# Patient Record
Sex: MALE | Employment: OTHER | URBAN - METROPOLITAN AREA
[De-identification: names, ages, dates, MRNs, and addresses within clinical notes are randomized per-mention and may not be internally consistent; named-entity substitution may affect disease eponyms.]

---

## 2017-04-10 ENCOUNTER — TRANSFERRED RECORDS (OUTPATIENT)
Dept: HEALTH INFORMATION MANAGEMENT | Facility: CLINIC | Age: 72
End: 2017-04-10

## 2017-04-24 ENCOUNTER — TRANSFERRED RECORDS (OUTPATIENT)
Dept: HEALTH INFORMATION MANAGEMENT | Facility: CLINIC | Age: 72
End: 2017-04-24

## 2017-04-28 ENCOUNTER — TRANSFERRED RECORDS (OUTPATIENT)
Dept: HEALTH INFORMATION MANAGEMENT | Facility: CLINIC | Age: 72
End: 2017-04-28

## 2021-06-17 ENCOUNTER — TRANSFERRED RECORDS (OUTPATIENT)
Dept: HEALTH INFORMATION MANAGEMENT | Facility: CLINIC | Age: 76
End: 2021-06-17

## 2021-11-11 ENCOUNTER — TRANSFERRED RECORDS (OUTPATIENT)
Dept: HEALTH INFORMATION MANAGEMENT | Facility: CLINIC | Age: 76
End: 2021-11-11

## 2022-08-08 ENCOUNTER — TRANSFERRED RECORDS (OUTPATIENT)
Dept: HEALTH INFORMATION MANAGEMENT | Facility: CLINIC | Age: 77
End: 2022-08-08

## 2023-04-24 PROCEDURE — 88305 TISSUE EXAM BY PATHOLOGIST: CPT | Mod: 26 | Performed by: PATHOLOGY

## 2023-04-24 PROCEDURE — 88305 TISSUE EXAM BY PATHOLOGIST: CPT | Mod: TC,ORL | Performed by: INTERNAL MEDICINE

## 2023-04-25 ENCOUNTER — LAB REQUISITION (OUTPATIENT)
Dept: LAB | Facility: CLINIC | Age: 78
End: 2023-04-25
Payer: MEDICARE

## 2023-04-25 DIAGNOSIS — R10.84 GENERALIZED ABDOMINAL PAIN: ICD-10-CM

## 2023-04-25 DIAGNOSIS — D12.5 BENIGN NEOPLASM OF SIGMOID COLON: ICD-10-CM

## 2023-04-25 DIAGNOSIS — K59.00 CONSTIPATION, UNSPECIFIED: ICD-10-CM

## 2023-04-25 DIAGNOSIS — K57.30 DIVERTICULOSIS OF LARGE INTESTINE WITHOUT PERFORATION OR ABSCESS WITHOUT BLEEDING: ICD-10-CM

## 2023-04-25 DIAGNOSIS — R19.4 CHANGE IN BOWEL HABIT: ICD-10-CM

## 2023-04-27 LAB
PATH REPORT.COMMENTS IMP SPEC: NORMAL
PATH REPORT.COMMENTS IMP SPEC: NORMAL
PATH REPORT.FINAL DX SPEC: NORMAL
PATH REPORT.GROSS SPEC: NORMAL
PATH REPORT.MICROSCOPIC SPEC OTHER STN: NORMAL
PATH REPORT.RELEVANT HX SPEC: NORMAL
PHOTO IMAGE: NORMAL

## 2023-05-25 ENCOUNTER — TRANSFERRED RECORDS (OUTPATIENT)
Dept: HEALTH INFORMATION MANAGEMENT | Facility: CLINIC | Age: 78
End: 2023-05-25

## 2023-06-01 ENCOUNTER — TRANSFERRED RECORDS (OUTPATIENT)
Dept: HEALTH INFORMATION MANAGEMENT | Facility: CLINIC | Age: 78
End: 2023-06-01

## 2023-07-07 ENCOUNTER — TRANSCRIBE ORDERS (OUTPATIENT)
Dept: OTHER | Age: 78
End: 2023-07-07

## 2023-07-07 DIAGNOSIS — G62.9 POLYNEUROPATHY: Primary | ICD-10-CM

## 2023-07-28 NOTE — TELEPHONE ENCOUNTER
Action 7/28/23 MV 11.15am   Action Taken 1) imaging request faxed to [facility]  2) records request faxed to [facility]      Action 8/22/23 MV 7.05am   Action Taken 1) 2nd request faxed to [facility]   2) [facility] images resolved in PACS     Action 9/13/23 MV 12.13pm   Action Taken [facility] records received and sent to scanning       RECORDS RECEIVED FROM: external   REASON FOR VISIT: Polyneuropathy    Date of Appt: 10/23/23   NOTES (FOR ALL VISITS) STATUS DETAILS   OFFICE NOTE from referring provider Care Everywhere Dr Vaughn Murphy @ [facility]:  7/6/23 4/18/23   OFFICE NOTE from other specialist Received Dr García Benito @ [facility]:  6/1/23 9/8/21   EMG Care Everywhere [facility]:  11/11/21    [facility]:  11/1/17    [facility]:  4/24/17    **EMG REPORTS ARE IN MEDIA TAB**   MEDICATION LIST Care Everywhere    IMAGING  (FOR ALL VISITS)     MRI (HEAD, NECK, SPINE) PACS [facility]:  MRI Head 9/16/21  MRI Brain 1/6/21  MRI Lumbar Spine 8/16/19  MRI Cervical Spine 6/17/19   CT (HEAD, NECK, SPINE) PACS [facility]:  CT Head 8/8/19  CT Head 6/18/19  CT Head 6/16/19

## 2023-10-23 ENCOUNTER — PRE VISIT (OUTPATIENT)
Dept: NEUROLOGY | Facility: CLINIC | Age: 78
End: 2023-10-23

## 2023-10-23 ENCOUNTER — OFFICE VISIT (OUTPATIENT)
Dept: NEUROLOGY | Facility: CLINIC | Age: 78
End: 2023-10-23
Attending: INTERNAL MEDICINE
Payer: MEDICARE

## 2023-10-23 VITALS
WEIGHT: 180 LBS | DIASTOLIC BLOOD PRESSURE: 76 MMHG | HEART RATE: 72 BPM | BODY MASS INDEX: 29.99 KG/M2 | HEIGHT: 65 IN | SYSTOLIC BLOOD PRESSURE: 117 MMHG | OXYGEN SATURATION: 92 %

## 2023-10-23 DIAGNOSIS — E85.81 LIGHT CHAIN (AL) AMYLOIDOSIS (H): Primary | ICD-10-CM

## 2023-10-23 DIAGNOSIS — M48.062 SPINAL STENOSIS OF LUMBAR REGION WITH NEUROGENIC CLAUDICATION: ICD-10-CM

## 2023-10-23 DIAGNOSIS — G62.9 POLYNEUROPATHY: ICD-10-CM

## 2023-10-23 PROCEDURE — 99205 OFFICE O/P NEW HI 60 MIN: CPT | Performed by: PSYCHIATRY & NEUROLOGY

## 2023-10-23 RX ORDER — LIDOCAINE 50 MG/G
OINTMENT TOPICAL
COMMUNITY
Start: 2023-03-24 | End: 2024-03-21

## 2023-10-23 RX ORDER — DULOXETIN HYDROCHLORIDE 30 MG/1
60 CAPSULE, DELAYED RELEASE ORAL EVERY MORNING
COMMUNITY
End: 2024-03-21

## 2023-10-23 RX ORDER — POTASSIUM CHLORIDE
20 CRYSTALS MISCELLANEOUS 2 TIMES DAILY
Status: ON HOLD | COMMUNITY
End: 2024-03-03

## 2023-10-23 RX ORDER — ISOSORBIDE MONONITRATE 60 MG/1
60 TABLET, EXTENDED RELEASE ORAL DAILY
COMMUNITY
Start: 2023-09-03 | End: 2023-12-04

## 2023-10-23 RX ORDER — ONDANSETRON 4 MG/1
TABLET, ORALLY DISINTEGRATING ORAL
COMMUNITY
Start: 2023-04-24 | End: 2024-03-21

## 2023-10-23 RX ORDER — HYDROCODONE BITARTRATE AND ACETAMINOPHEN 5; 325 MG/1; MG/1
1 TABLET ORAL 3 TIMES DAILY PRN
COMMUNITY
Start: 2023-10-19 | End: 2023-12-04

## 2023-10-23 RX ORDER — TRAZODONE HYDROCHLORIDE 50 MG/1
75 TABLET, FILM COATED ORAL AT BEDTIME
COMMUNITY
Start: 2023-05-09 | End: 2024-03-21

## 2023-10-23 RX ORDER — CHLORHEXIDINE GLUCONATE ORAL RINSE 1.2 MG/ML
15 SOLUTION DENTAL 2 TIMES DAILY PRN
COMMUNITY
Start: 2023-09-29

## 2023-10-23 RX ORDER — NALOXONE HYDROCHLORIDE 4 MG/.1ML
SPRAY NASAL
COMMUNITY
Start: 2023-07-24 | End: 2024-03-21

## 2023-10-23 RX ORDER — TORSEMIDE 10 MG/1
20 TABLET ORAL DAILY
Status: ON HOLD | COMMUNITY
Start: 2023-06-06 | End: 2024-03-03

## 2023-10-23 RX ORDER — METOPROLOL SUCCINATE 25 MG/1
TABLET, EXTENDED RELEASE ORAL
COMMUNITY
Start: 2023-08-07 | End: 2023-12-04

## 2023-10-23 RX ORDER — LUBIPROSTONE 8 UG/1
24 CAPSULE ORAL 2 TIMES DAILY
COMMUNITY
Start: 2023-10-07 | End: 2023-12-04

## 2023-10-23 RX ORDER — ALLOPURINOL 300 MG/1
TABLET ORAL
COMMUNITY
Start: 2023-02-19 | End: 2024-03-21

## 2023-10-23 RX ORDER — METOLAZONE 5 MG/1
TABLET ORAL
Status: ON HOLD | COMMUNITY
End: 2023-12-07

## 2023-10-23 RX ORDER — VITAMIN B COMPLEX
1 CAPSULE ORAL DAILY
COMMUNITY

## 2023-10-23 RX ORDER — BUPROPION HYDROCHLORIDE 150 MG/1
300 TABLET ORAL EVERY MORNING
COMMUNITY
End: 2024-03-21

## 2023-10-23 RX ORDER — FLUTICASONE PROPIONATE 50 MCG
SPRAY, SUSPENSION (ML) NASAL
Status: ON HOLD | COMMUNITY
Start: 2022-04-06 | End: 2024-02-28

## 2023-10-23 RX ORDER — ATORVASTATIN CALCIUM 40 MG/1
40 TABLET, FILM COATED ORAL DAILY
COMMUNITY
Start: 2023-06-13 | End: 2024-03-21

## 2023-10-23 RX ORDER — ALBUTEROL SULFATE 90 UG/1
2 AEROSOL, METERED RESPIRATORY (INHALATION) EVERY 6 HOURS PRN
COMMUNITY
Start: 2021-12-25 | End: 2023-12-04

## 2023-10-23 RX ORDER — CALCIUM CARB/VITAMIN D3/VIT K1 500-500-40
200-400 TABLET,CHEWABLE ORAL
COMMUNITY
Start: 2021-12-22 | End: 2023-12-04

## 2023-10-23 RX ORDER — PANTOPRAZOLE SODIUM 40 MG/1
40 TABLET, DELAYED RELEASE ORAL DAILY
Status: ON HOLD | COMMUNITY
End: 2024-02-28

## 2023-10-23 RX ORDER — FLUOROMETHOLONE 0.1 %
1 SUSPENSION, DROPS(FINAL DOSAGE FORM)(ML) OPHTHALMIC (EYE) PRN
Status: ON HOLD | COMMUNITY
Start: 2022-04-03 | End: 2024-02-28

## 2023-10-23 RX ORDER — ACETAMINOPHEN 500 MG
1000 TABLET ORAL EVERY 6 HOURS PRN
COMMUNITY
Start: 2022-09-27 | End: 2023-12-22

## 2023-10-23 RX ORDER — FINASTERIDE 5 MG/1
1 TABLET, FILM COATED ORAL DAILY
COMMUNITY
Start: 2023-10-06 | End: 2024-03-21

## 2023-10-23 RX ORDER — ACYCLOVIR 200 MG/1
CAPSULE ORAL
COMMUNITY
Start: 2022-11-15 | End: 2023-12-22

## 2023-10-23 RX ORDER — DOCUSATE SODIUM 100 MG/1
CAPSULE, LIQUID FILLED ORAL
COMMUNITY
End: 2023-12-04

## 2023-10-23 RX ORDER — HYDROMORPHONE HYDROCHLORIDE 4 MG/1
TABLET ORAL
COMMUNITY
Start: 2023-03-08 | End: 2023-12-04

## 2023-10-23 RX ORDER — NITROGLYCERIN 0.4 MG/1
TABLET SUBLINGUAL
COMMUNITY
End: 2024-03-21

## 2023-10-23 RX ORDER — GABAPENTIN 300 MG/1
CAPSULE ORAL
COMMUNITY
Start: 2023-07-06 | End: 2023-12-04

## 2023-10-23 RX ORDER — COVID-19 MOLECULAR TEST ASSAY
KIT MISCELLANEOUS
COMMUNITY
Start: 2023-03-18 | End: 2023-12-04

## 2023-10-23 RX ORDER — COLCHICINE 0.6 MG/1
0.6 TABLET ORAL
COMMUNITY
Start: 2021-09-28 | End: 2023-12-04

## 2023-10-23 ASSESSMENT — PAIN SCALES - GENERAL: PAINLEVEL: MODERATE PAIN (5)

## 2023-10-23 NOTE — PATIENT INSTRUCTIONS
I would recommend seeing a general surgeon for biopsy of a piece of your abdominal fat  If this test is negative we should consider a nerve biopsy at your ankle    I am looking for the possibility of amyloidosis.  This is a condition related to abnormal proteins in the blood, that causes deposition of a substance called amyloid in the nerves, and gradually destroys them.  It can cause a very painful neuropathy, but also heart disease, and if untreated, it could even become fatal.  If the biopsy tests are positive, then we should notify your blood doctor immediately, and he should proceed to bone marrow biopsy and additional test, and recommended treatment.    You may have mild diabetes, but I do not think this is enough to explain your neuropathy.    I will contact you with the test results.

## 2023-10-23 NOTE — PROGRESS NOTES
"Vaughn Murphy DO (referring provider)  Jakob Conner MD  (PCP)    [place], October 23, 2023    Dear Doctors,    I had the pleasure to see Eren James at the [facility] neuromuscular clinic today for another opinion on his neuropathy diagnosis.  Thank you for this referral.  As you know he is a very pleasant 77-year-old man with a history of chronic pain, on opioid pump, following with a local pain clinic.  He also takes Dilaudid and in the past he was on opioid doses as high as 180 morphine equivalents per day.  He is also taking duloxetine.  He has had a neuropathy diagnosis for about 6 years, and previously saw my colleagues at [facility], [facility] and [facility] neurological [facility].  He began experiencing a feeling as if \" water was running down his legs\" from the knees to the feet, about 6 years ago.  There was also tingling and numbness and sharp pain there.  He had an EMG study first in 2017, that showed a length-dependent axonal sensorimotor process, with no motor or sensory responses of the feet, and normal sensory nerve amplitudes of the upper extremities.  Needle EMG examination was unremarkable.  [facility] repeated the EMG 2 to 3 years later, and found identical findings, although they did not study the upper extremities. He continues to experience pain in his feet when standing or walking, but not so much at night anymore.  On top of this, he has chronic pain in his back and upper thighs, exacerbated by standing or walking, and forcing him to stop.  This pain improves when he is using the grocery cart or leaning forward, which has made  Suspect lumbar spinal stenosis.  In fact this diagnosis was pretty clear and an MRI of the lumbar spine he had about 4 years ago.  He was diagnosed with diabetes around the same time of the neuropathy diagnosis was made.  His hemoglobin A1c at that time was 6.7%.  I reviewed all the results in the chart, and no hemoglobin A1c was " over 7% in the last 5 years, and he is not receiving any medication treatment for diabetes, nor does he have any endorgan damage.  He used to work in [place] for a decade in the past and was drinking a glass of wine per day, but since he returned to the [place] about a decade ago, he does not drink daily.  There is no family history of neuropathy.  She is not on any vegetarian, vegan, or other restrictive diet.  He has never had bariatric surgery.  In terms of review of systems, he reports prominent dry eyes and dry mouth, occasional dysphagia, and swollen knuckle joints.  She has no rashes, Raynaud's syndrome, anorexia, or weight loss.  He denies blood in his urine or stool.  He has a generalized feeling of fatigue, including difficulty getting up from a chair, ascending stairs, and a feeling of muscle weakness in the last years.  In the past he was on testosterone replacement, injectable, and then gels or patches, but this was discontinued at the recommendation of another physician (there are reasons are somewhat unclear to me).    He reports occasional orthostatic intolerance although no kaz syncope.  He has no signs of gastroparesis, or incontinence of bowel or bladder.  He does report chronic erectile dysfunction as well.    Previously, he had normal B12, TSH, CRP levels, negative JENY antibodies, negative hepatitis C serologies.  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  He has a very abnormal light chain ratio kappa to lambda of less than 0.01.  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  They did not proceed with bone marrow biopsy.  CBC showed no anemia.    Past Medical History:   Diagnosis Date    ASCVD     Previous coronary angioplasties    BACKACHE NOS     Chronic back pain; takes MS Contin    ESOPHAGEAL REFLUX     HYPERLIPIDEMIA NEC/NOS     HYPERTENSION  "NOS     HYPERTROPHY PROSTATE WITH OBST     TESTICULAR HYPOFUNC NEC     URINARY CALCULUS NOS     VISCERAL ANEURYSM NEC     See PS       /76 (BP Location: Right arm, Patient Position: Right side, Cuff Size: Adult Regular)   Pulse 72   Ht 1.651 m (5' 5\")   Wt 81.6 kg (180 lb)   SpO2 92%   BMI 29.95 kg/m      EXAM: His strength is 5/5 for bilateral shoulder abduction, elbow flexion, elbow extension, wrist extension, hand .  FDI and APB are bilaterally 4/5 with mild intrinsic muscle atrophy.  He is hip flexion is 4/5 bilaterally, whereas knee extension and knee flexion, as well as foot dorsiflexion are full.  Great toe extension is about a 3 on the right and 4 - on the left.  Tone is normal.  Ankle reflex were absent.  Knee reflex were 2+ on the right, 1+ on the left.  Upper extremity reflexes were 2+ and symmetric.  Vibration was detected for only 1 to 2 seconds at the right great toe, 0 at the left.  It was also 0 at the medial malleoli.  Joint position sensation was not absent, but it was moderately impaired at both great toes.  Pinprick was absent to the level of the upper third of the calfs bilaterally.  Pinprick of the upper extremities was reduced at the tips of the fingers and normal at the wrist.  He had a hard time getting up from a chair with arms crossed on the chest.  She generally needed to use his arms.  His posture was a little camptocormia and he had a very wide base and a positive Romberg sign.    In summary, Mr. James clinically has a quite severe length-dependent sensorimotor polyneuropathy.  I think the neuropathy both clinically and by EMG is more severe than one would expect by mild diabetes with hemoglobin A1c consistently below 7% without treatment.  Therefore alternative causes should be sought.  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  While most of the times this is coincidental with the neuropathy without causative " relationship, I have some concerns about amyloidosis here, given the very painful and progressive nature of the sensory neuropathy, and some autonomic symptoms (orthostatic intolerance, ED, etc).  I would recommend a general surgery consultation for abdominal fat pad biopsy, followed by a sural nerve biopsy if the former is negative.  I explained to the patient what amyloidosis is, and the fact that if the biops(ies) are positive, he needs to go back to his hematologist, get a bone marrow examination, and a treatment plan.    I am afraid I do not have much to offer regarding his pain management.  It is already very complicated, to the level of opioid pump, and I would defer to his pain clinic.    He also has lumbar spine stenosis with symptoms suggestive of neurogenic claudication.  I would recommend a spine surgery referral, and continued pain management.    I think his proximal thigh weakness is largely due to deconditioning +/- lumbar radiculopathies, although some opioid related hypogonadism could play a role.    I will see the patient follow-up as necessary, but if the biopsies are abnormal, we will make sure that his hematologist is aware.    Sincerely,    Elías Juan MD, FAAN    Total time spent on this encounter today 70 minutes of which 45 face-to-face, 15 in postvisit note dictation, editing, and orders, and 10 in previsit chart review.

## 2023-10-23 NOTE — NURSING NOTE
Chief Complaint   Patient presents with    New Patient    NEUROPATHY       Vitals were taken and medications were reconciled.      Miquel Zamora, Technician  10:52 AM  October 23, 2023

## 2023-10-23 NOTE — LETTER
"10/23/2023       RE: Erne James  [address] [place] [address]     Dear Colleague,    Thank you for referring your patient, Eren James, to the [facility] [place] [facility]. Please see a copy of my visit note below.    Vaughn Murphy DO (referring provider)  Jakob Conner MD  (PCP)    [place], October 23, 2023    Dear Doctors,    I had the pleasure to see Eren James at the [facility] neuromuscular clinic today for another opinion on his neuropathy diagnosis.  Thank you for this referral.  As you know he is a very pleasant 77-year-old man with a history of chronic pain, on opioid pump, following with a local pain clinic.  He also takes Dilaudid and in the past he was on opioid doses as high as 180 morphine equivalents per day.  He is also taking duloxetine.  He has had a neuropathy diagnosis for about 6 years, and previously saw my colleagues at [facility], [facility] and [facility] neurological clinic.  He began experiencing a feeling as if \" water was running down his legs\" from the knees to the feet, about 6 years ago.  There was also tingling and numbness and sharp pain there.  He had an EMG study first in 2017, that showed a length-dependent axonal sensorimotor process, with no motor or sensory responses of the feet, and normal sensory nerve amplitudes of the upper extremities.  Needle EMG examination was unremarkable.  [facility] repeated the EMG 2 to 3 years later, and found identical findings, although they did not study the upper extremities. He continues to experience pain in his feet when standing or walking, but not so much at night anymore.  On top of this, he has chronic pain in his back and upper thighs, exacerbated by standing or walking, and forcing him to stop.  This pain improves when he is using the grocery cart or leaning forward, which has made  Suspect " lumbar spinal stenosis.  In fact this diagnosis was pretty clear and an MRI of the lumbar spine he had about 4 years ago.  He was diagnosed with diabetes around the same time of the neuropathy diagnosis was made.  His hemoglobin A1c at that time was 6.7%.  I reviewed all the results in the chart, and no hemoglobin A1c was over 7% in the last 5 years, and he is not receiving any medication treatment for diabetes, nor does he have any endorgan damage.  He used to work in [place] ([employer] for a decade in the past and was drinking a glass of wine per day, but since he returned to the  about a decade ago, he does not drink daily.  There is no family history of neuropathy.  She is not on any vegetarian, vegan, or other restrictive diet.  He has never had bariatric surgery.  In terms of review of systems, he reports prominent dry eyes and dry mouth, occasional dysphagia, and swollen knuckle joints.  She has no rashes, Raynaud's syndrome, anorexia, or weight loss.  He denies blood in his urine or stool.  He has a generalized feeling of fatigue, including difficulty getting up from a chair, ascending stairs, and a feeling of muscle weakness in the last years.  In the past he was on testosterone replacement, injectable, and then gels or patches, but this was discontinued at the recommendation of another physician (there are reasons are somewhat unclear to me).    He reports occasional orthostatic intolerance although no kaz syncope.  He has no signs of gastroparesis, or incontinence of bowel or bladder.  He does report chronic erectile dysfunction as well.    Previously, he had normal B12, TSH, CRP levels, negative JENY antibodies, negative hepatitis C serologies.  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  He has a very abnormal light chain ratio kappa to lambda of less than 0.01.  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  They did not proceed with bone marrow biopsy.  CBC showed no anemia.    Past Medical History:   Diagnosis Date    ASCVD     Previous coronary angioplasties    BACKACHE NOS     Chronic back pain; takes MS Contin    ESOPHAGEAL REFLUX     HYPERLIPIDEMIA NEC/NOS     HYPERTENSION NOS     HYPERTROPHY PROSTATE WITH OBST     TESTICULAR HYPOFUNC NEC     URINARY CALCULUS NOS     VISCERAL ANEURYSM NEC     See P[facility]       /76 (BP Location: Right arm, Patient Position: Right side, Cuff Size: Adult Regular)   Pulse 72   Ht 1.651 m (5' 5\")   Wt 81.6 kg (180 lb)   SpO2 92%   BMI 29.95 kg/m      EXAM: His strength is 5/5 for bilateral shoulder abduction, elbow flexion, elbow extension, wrist extension, hand .  FDI and APB are bilaterally 4/5 with mild intrinsic muscle atrophy.  He is hip flexion is 4/5 bilaterally, whereas knee extension and knee flexion, as well as foot dorsiflexion are full.  Great toe extension is about a 3 on the right and 4 - on the left.  Tone is normal.  Ankle reflex were absent.  Knee reflex were 2+ on the right, 1+ on the left.  Upper extremity reflexes were 2+ and symmetric.  Vibration was detected for only 1 to 2 seconds at the right great toe, 0 at the left.  It was also 0 at the medial malleoli.  Joint position sensation was not absent, but it was moderately impaired at both great toes.  Pinprick was absent to the level of the upper third of the calfs bilaterally.  Pinprick of the upper extremities was reduced at the tips of the fingers and normal at the wrist.  He had a hard time getting up from a chair with arms crossed on the chest.  She generally needed to use his arms.  His posture was a little camptocormia and he had a very wide base and a positive Romberg sign.    In summary, Mr. James clinically has a quite severe length-dependent sensorimotor polyneuropathy.  I think the neuropathy both clinically and by EMG is more severe than one " would expect by mild diabetes with hemoglobin A1c consistently below 7% without treatment.  Therefore alternative causes should be sought.  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  While most of the times this is coincidental with the neuropathy without causative relationship, I have some concerns about amyloidosis here, given the very painful and progressive nature of the sensory neuropathy, and some autonomic symptoms (orthostatic intolerance, ED, etc).  I would recommend a general surgery consultation for abdominal fat pad biopsy, followed by a sural nerve biopsy if the former is negative.  I explained to the patient what amyloidosis is, and the fact that if the biops(ies) are positive, he needs to go back to his hematologist, get a bone marrow examination, and a treatment plan.    I am afraid I do not have much to offer regarding his pain management.  It is already very complicated, to the level of opioid pump, and I would defer to his pain clinic.    He also has lumbar spine stenosis with symptoms suggestive of neurogenic claudication.  I would recommend a spine surgery referral, and continued pain management.    I think his proximal thigh weakness is largely due to deconditioning +/- lumbar radiculopathies, although some opioid related hypogonadism could play a role.    I will see the patient follow-up as necessary, but if the biopsies are abnormal, we will make sure that his hematologist is aware.      Total time spent on this encounter today 70 minutes of which 45 face-to-face, 15 in postvisit note dictation, editing, and orders, and 10 in previsit chart review.        Again, thank you for allowing me to participate in the care of your patient.      Sincerely,    Elías Juan MD

## 2023-10-24 NOTE — TELEPHONE ENCOUNTER
REFERRAL INFORMATION:  Referring Provider: Dr. Elías Juan  Referring Clinic: [facility] - Neurology  Reason for Visit/Diagnosis: Abdominal Fat Pad Biopsy       FUTURE VISIT INFORMATION:  Appointment Date: 10/27/2023  Appointment Time: 7:20 AM     NOTES RECORD STATUS  DETAILS   OFFICE NOTE from Referring Provider [facility]:  10/23/23 - NEURO OV with Dr. Juan   OFFICE NOTE from Other Specialists N/A    HOSPITAL DISCHARGE SUMMARY/ ED VISITS  Care Everywhere [facility]:  2/9/23 - ED OV with Dr. Gibson  1/6/21 - ED OV with Dr. Stone   OPERATIVE REPORT [facility]:  3/25/08 - OP Note for VENTRAL HERNIA REPAIR WITH MESH with Dr. Alves   ENDOSCOPY (EGD)  Care Everywhere [facility]:  6/15/20 - EGD   PERTINENT LABS Care Everywhere / Internal    IMAGING (CT, MRI, US, XR)  Received [facility]:  3/3/23 - CT Chest  2/28/23, 12/28/22 - XR Chest  2/9/23 - CT Abd/Pelvis     Records Requested    Facility  [facility]  Fax: [phone]   Outcome * 10/24/23 8:41 AM Faxed urg req to [facility] for images to be pushed to [facility]. - Allie    * 10/25/23 7:35 AM Images received from [facility] and attached to the patient in PACs. - Allie

## 2023-10-27 ENCOUNTER — OFFICE VISIT (OUTPATIENT)
Dept: SURGERY | Facility: CLINIC | Age: 78
End: 2023-10-27
Attending: PSYCHIATRY & NEUROLOGY
Payer: MEDICARE

## 2023-10-27 ENCOUNTER — PRE VISIT (OUTPATIENT)
Dept: SURGERY | Facility: CLINIC | Age: 78
End: 2023-10-27

## 2023-10-27 VITALS
HEIGHT: 65 IN | OXYGEN SATURATION: 92 % | SYSTOLIC BLOOD PRESSURE: 157 MMHG | HEART RATE: 80 BPM | BODY MASS INDEX: 31.17 KG/M2 | DIASTOLIC BLOOD PRESSURE: 87 MMHG | WEIGHT: 187.1 LBS

## 2023-10-27 DIAGNOSIS — E85.81 LIGHT CHAIN (AL) AMYLOIDOSIS (H): ICD-10-CM

## 2023-10-27 DIAGNOSIS — G62.9 POLYNEUROPATHY: ICD-10-CM

## 2023-10-27 PROCEDURE — 11104 PUNCH BX SKIN SINGLE LESION: CPT | Performed by: SURGERY

## 2023-10-27 PROCEDURE — 88313 SPECIAL STAINS GROUP 2: CPT | Mod: TC | Performed by: SURGERY

## 2023-10-27 PROCEDURE — 88313 SPECIAL STAINS GROUP 2: CPT | Mod: 26 | Performed by: PATHOLOGY

## 2023-10-27 PROCEDURE — 88304 TISSUE EXAM BY PATHOLOGIST: CPT | Mod: 26 | Performed by: PATHOLOGY

## 2023-10-27 PROCEDURE — 99203 OFFICE O/P NEW LOW 30 MIN: CPT | Mod: 25 | Performed by: SURGERY

## 2023-10-27 ASSESSMENT — PAIN SCALES - GENERAL: PAINLEVEL: NO PAIN (0)

## 2023-10-27 NOTE — LETTER
10/27/2023       RE: Eren James  [address] [place] [facility] [address]     Dear Colleague,    Thank you for referring your patient, Eren James, to the [facility] [place] at [facility]. Please see a copy of my visit note below.    General Surgery Clinic Note - New Patient Visit    NAME: Eren James,  77 year old male    PCP: Jakob Conner  MRN:   [id number]      Ph#: [phone]  Date: Oct 27, 2023    Chief Complaint: rule out amyloid    History of Present Illness: Eren James is a 77 year old male who presents to the clinic with unexpained neuropathy of the lower extremity.  Fat pad biopsy to rule out amyloid is desired.    Past Medical History: History in Epic reviewed with the patient:  Past Medical History:   Diagnosis Date    Aneurysm of other visceral artery     See PSH    Backache, unspecified     Chronic back pain; takes MS Contin    Esophageal reflux     Hypertrophy of prostate with urinary obstruction and other lower urinary tract symptoms (LUTS)     Other and unspecified hyperlipidemia     Other testicular hypofunction     Unspecified cardiovascular disease     Previous coronary angioplasties    Unspecified essential hypertension     Urinary calculus, unspecified        Past Surgical History: History in Epic reviewed with the patient:  Past Surgical History:   Procedure Laterality Date    [facility] NONSPECIFIC PROCEDURE      Colon surg for diverticulitis    Z NONSPECIFIC PROCEDURE      T&A    Z NONSPECIFIC PROCEDURE      Coronary angioplasties    [facility] NONSPECIFIC PROCEDURE      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 hx bypass.    Cardiovascular Father         .cardiomegaly.    Diabetes Sister                 Social History: History in Epic reviewed with the patient:  Social History     Socioeconomic History    Marital status:      Spouse name: Not on file    Number of children: Not on file    Years of education: Not on file    Highest education level: Not on file   Occupational History    Not on file   Tobacco Use    Smoking status: Never    Smokeless tobacco: Never   Substance and Sexual Activity    Alcohol use: No    Drug use: No    Sexual activity: Not on file   Other Topics Concern    Not on file   Social History Narrative    Not on file     Social Determinants of Health     Financial Resource Strain: Not on file   Food Insecurity: Not on file   Transportation Needs: Not on file   Physical Activity: Not on file   Stress: Not on file   Social Connections: Not on file   Interpersonal Safety: Not on file   Housing Stability: Not on file       Allergies:     Allergies   Allergen Reactions    Armodafinil Other (See Comments)    No Known Drug Allergy        Outpatient Medications:  Outpatient Encounter Medications as of 10/27/2023   Medication Sig Dispense Refill    acetaminophen (TYLENOL) 500 MG tablet Take 1,000 mg by mouth      acyclovir (ZOVIRAX) 200 MG capsule TAKE 2 CAPSULES BY MOUTH TWICE DAILY FOR 5 DAYS AS NEEDED FOR COLD SORES      allopurinol (ZYLOPRIM) 300 MG tablet TAKE 1 TABLET(300 MG) BY MOUTH EVERY DAY Strength: 300 mg      ASPIRIN 325 MG OR TBEC Take 81 mg by mouth daily 100 3    atorvastatin (LIPITOR) 40 MG tablet Take 40 mg by mouth daily      B Complex CAPS as directed Orally      buPROPion (WELLBUTRIN XL) 150 MG 24 hr tablet Take 150 mg by mouth every morning      chlorhexidine (PERIDEX) 0.12 % solution SWISH AND SPIT 15 ML BY MOUTH TWICE DAILY      docusate sodium (DSS) 100 MG capsule 1 capsule as needed Orally Once a day for 30 day(s)      DULoxetine (CYMBALTA) 30 MG capsule Take 60 mg by mouth every morning      finasteride (PROSCAR) 5 MG tablet Take 1 tablet by mouth daily at 2 pm       FLOMAX 0.4 MG OR CP24 1 TABLET DAILY AFTER A MEAL 30 11    fluorometholone (FML LIQUIFILM) 0.1 % ophthalmic suspension       fluticasone (FLONASE) 50 MCG/ACT nasal spray SHAKE LIQUID WELL AND INHALE 2 SPRAYS INTO AFFECTED NOSTRILS TWICE DAILY AS NEEDED FOR RHINITIS      gabapentin (NEURONTIN) 300 MG capsule Take 1 capsule in the morning and take 2 capsules at bedtime      HYDROcodone-acetaminophen (NORCO) 5-325 MG tablet Take 1 tablet by mouth 3 times daily as needed for pain      lidocaine (XYLOCAINE) 5 % external ointment APPLY TOPICALLY TO THE AFFECTED AREA THREE TIMES DAILY AS NEEDED      lubiprostone (AMITIZA) 8 MCG capsule 24 mcg 2 times daily      MULTIVITAMINS OR TABS 1 tablet daily  0    NARCAN 4 MG/0.1ML nasal spray once as needed      nitroGLYcerin (NITROSTAT) 0.4 MG sublingual tablet DISSOLVE 1 TABLET UNDER THE TONGUE AS NEEDED FOR CHEST PAIN EVERY 5 MINUTES AS NEEDED AS DIRECTED      OMEPRAZOLE 40 MG OR CPDR 1 CAPSULE DAILY 30 11    ondansetron (ZOFRAN ODT) 4 MG ODT tab DISSOLVE 1 TABLET(4 MG) ON THE TONGUE EVERY 8 HOURS AS NEEDED FOR NAUSEA OR VOMITING      OXYCONTIN 160 MG OR TB12  60 0    pantoprazole (PROTONIX) 40 MG EC tablet Take 40 mg by mouth daily      potassium chloride (KLOR-CON) 20 MEQ packet 1 packet with food Orally Once a day for 30 day(s)      torsemide (DEMADEX) 20 MG tablet Take 1 tablet by mouth daily      traZODone (DESYREL) 50 MG tablet Take 75 mg by mouth      albuterol (VENTOLIN HFA) 108 (90 Base) MCG/ACT inhaler Inhale 2 puffs into the lungs every 6 hours as needed      Alpha-Lipoic Acid 200 MG TABS Take 200-400 mg by mouth      ALPRAZOLAM 0.5 MG OR TABS 1 po q6 h prn anxiety 50 1    AMBIEN 10 MG OR TABS 1 TABLET AT BEDTIME AS NEEDED (Patient not taking: Reported on 10/23/2023) 30 5    BINAXNOW COVID-19 AG HOME TEST KIT follow package directions      colchicine (COLCRYS) 0.6 MG tablet Take 0.6 mg by mouth      HYDROmorphone (DILAUDID) 4 MG tablet       IMDUR 30 MG OR TB24 1  "TABLET EVERY MORNING 30 0    isosorbide mononitrate (IMDUR) 60 MG 24 hr tablet Take 60 mg by mouth daily      LISINOPRIL 40 MG OR TABS 1 tab PO QD (Once per day) (Patient not taking: Reported on 10/23/2023) 30 5    metolazone (ZAROXOLYN) 5 MG tablet TAKE 1 TABLET BY MOUTH EVERY DAY AS NEEDED FOR WEIGHT GAIN OR LEG SWELLING      metoprolol succinate ER (TOPROL XL) 25 MG 24 hr tablet  (Patient not taking: Reported on 10/23/2023)      METOPROLOL SUCCINATE TBCR# 50 MG OR take one tablet by mouth daily (Patient taking differently: Take 25 mg by mouth) 30 5    NEXIUM 20 MG OR CPDR 1 CAPSULE BID (Patient not taking: Reported on 10/23/2023) 30 11    PLAVIX 75 MG OR TABS 1 TABLET DAILY (Patient not taking: Reported on 10/23/2023)      TESTOSTERONE CYPIONATE 200 MG/ML IM  MG EVERY 2 WEEKS (Patient not taking: Reported on 10/23/2023) 24 0    UROXATRAL 10 MG OR TB24 1 TABLET DAILY AFTER A MEAL (Patient not taking: Reported on 10/23/2023) 30 0    ZOLOFT 100 MG OR TABS 1 1/2 tabs daily (150 mg total)  MD appointment needed (Patient not taking: Reported on 10/23/2023) 45 0  MD appointment needed     No facility-administered encounter medications on file as of 10/27/2023.       ROS: 10 systems reviewed and all are negative except as above.    EXAM:  BP (!) 157/87 (BP Location: Left arm, Patient Position: Sitting, Cuff Size: Adult Regular)   Pulse 80   Ht 1.651 m (5' 5\")   Wt 84.9 kg (187 lb 1.6 oz)   SpO2 92%   BMI 31.14 kg/m    Psych: Normal affect  Neuro: No gross focal deficits noted  Head:Normocephalic, atraumatic  Eyes: Non icteric  Neck:supple  Heart: Regular rate and rhythm  Lungs: non-labored, quiet respiration  Abdomen: old healed scars. Soft, rounded, suitable fat pad.  Extremities: No obvious deformities    Imaging Data (I have personally reviewed the following reports):  Recent Results (from the past 744 hour(s))   US VENOUS LOWER EXTREMITY RIGHT    Narrative    VASCULAR ULTRASOUND REPORT    CITLALLI ANDINO" TAMMY  MRN:    [id number] Accession#:   [id number]  :    1945  Study Date:   10/9/2023 11:43:31 AM  Age:    77 years   Tech:         BSG  Gender: M          Referring MD: KARLI MURPHY      Site: [facility]    Study performed:      Duplex US DVT study, (right).  Indication for study: LE pain/edema and history of DVT in right LE  Study Quality:        Good      TECHNIQUE:  Lower/upper extremity veins were examined with duplex ultrasound, color-flow and spectral Doppler per exam protocol. Vein compressibility by transducer pressure was used to evaluate presence/absence of DVT/SVT. Venous flow and competence was evaluated by flow augmentation maneuvers per exam protocol. Insufficiency studies were performed with the patient in upright position, with vein diameters measured in mm, and reflux.    IMPRESSION:   1. No evidence of deep or superficial vein thrombosis in the right lower extremity.    COMPARISON:  No prior study available for comparison.    FINDINGS:  Preliminary results were given to Dr Murphy via Secure Chat at the time of the imaging visit.    Right Lower Extremity:  No evidence of deep or superficial vein thrombosis in the right lower extremity.      MEASUREMENTS:    +--------------+--------+-----+--------+----+                 RIGHT  RIGHT  LEFT  LEFT                Compress DVT CompressDVT                          SVT         SVT   +--------------+--------+-----+--------+----+  CFV             yes   None   yes   None  +--------------+--------+-----+--------+----+  PFV             yes   None               +--------------+--------+-----+--------+----+  SAPH/FEM JUNCT  yes   None               +--------------+--------+-----+--------+----+  FV              yes   None               +--------------+--------+-----+--------+----+  POPV            yes   None               +--------------+--------+-----+--------+----+  PTV             yes    None               +--------------+--------+-----+--------+----+  PERONEAL        yes   None               +--------------+--------+-----+--------+----+  GSV             yes   None               +--------------+--------+-----+--------+----+  SSV             yes   None               +--------------+--------+-----+--------+----+    ** = Can't evaluate  Marcus Villalta MD.  [facility]  Electronically signed on 10/9/2023 1:59:13 PM    This study was performed and interpreted by a service accredited by the Intersocietal Accreditation Commission (IAC/Vascular), www.intersocietal.org/vascular    Report generated by [employer].          Final     US VENOUS INSUFFICIENCY LOWER EXTREMITY BILATERAL    Narrative    VASCULAR ULTRASOUND REPORT    CITLALLI MUNOZ  MRN:    [id number] Accession#:   [id number]  :    1945  Study Date:   10/13/2023 1:04:31 PM  Age:    77 years   Tech:         [facility]  Gender: M          Referring MD: ROMEO HUYNH      Site: [facility]    Study performed:      Duplex US venous insufficiency, (bilateral).  Indication for study: LE pain/edema and varicose veins  Study Quality:        Good      TECHNIQUE:  Lower/upper extremity veins were examined with duplex ultrasound, color-flow and spectral Doppler per exam protocol. Vein compressibility by transducer pressure was used to evaluate presence/absence of DVT/SVT. Venous flow and competence was evaluated by flow augmentation maneuvers per exam protocol. Insufficiency studies were performed with the patient in upright position, with vein diameters measured in mm, and reflux.    IMPRESSION:   1. No evidence of deep vein thrombosis in bilateral lower extremities. Deep reflux was noted in the left common femoral vein.   2. Right GSV borderline segmentally incompetent at the saphenofemoral junction, remainder of GSV is competent.   3. On the right there is an associated branch that courses up the medial  calf and is partially thrombosed with age-indeterminate thrombus from knee to mid calf level.   4. Left GSV is patent and borderline segmentally incompetent at the saphenofemoral junction. There is an associated incompetent varicose branch at proximal calf that is thrombosed near mid medial calf.   5. Right small saphenous vein is patent and segmentally incompetent at mid and distal calf with associated incompetent varicose vein the courses from distal to mid posteromedial calf.   6. 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    COMPARISON:  Compared to prior study 02/07/2023, superfiical thrombophlebitis in both lower extremities is a new finding.    FINDINGS:  No evidence of deep vein thrombosis in bilateral lower extremities. Deep reflux was noted in the left common femoral vein. Imaging of the right superficial veins reveals a greater saphenous vein that is patent and borderline segmentally incompetent at the saphenofemoral junction. There is an associated branch that courses up the medial calf and is partially thrombosed with age-indeterminate thrombus from knee to mid calf level. The small saphenous vein is patent and segmentally incompetent at mid and distal calf with associated incompetent varicose vein the courses from distal to mid posteromedial calf. Imaging of the left superficial veins reveals a greater saphenous vein that is patent and borderline segmentally incompetent at the saphenofemoral junction. There is an associated incompetent varicose branch at proximal calf that is thrombosed near mid medial calf. The small saphenous vein has acute on chronic SVT that is occlusive from proximal to distal calf.  Right Lower Extremity:  No deep venous insufficiency. No evidence of DVT. Varicose vein at distal medial thigh to calf , 3.3 mm diameter. Varicose vein at posteromedial distal calf, 3.7 mm diameter, 4.0 sec reflux.  Left Lower Extremity:  No  evidence of DVT. Varicose vein at proximal medial calf, 3.6 mm diameter, 2.6 sec reflux.    MEASUREMENTS:    +--------------+--------+----+--------+------+  RIGHT         CompressSVT DiameterReflux                              (mm)  (secs)  +--------------+--------+----+--------+------+  SFJ           yes     None  6.8    0.5    +--------------+--------+----+--------+------+  GSV THIGH PRX yes     None  4.2    0.0    +--------------+--------+----+--------+------+  GSV THIGH MID yes     None  3.6    0.0    +--------------+--------+----+--------+------+  GSV THIGH DST yes     None  4.1    0.0    +--------------+--------+----+--------+------+  GSV KNEE      yes     None  3.8    0.0    +--------------+--------+----+--------+------+  GSV CALF UPPERyes     None  4.1    0.0    +--------------+--------+----+--------+------+  GSV CALF MID  yes     None  2.5    0.0    +--------------+--------+----+--------+------+  GSV CALF LOW  yes     None  2.6    0.0    +--------------+--------+----+--------+------+  SSV KNEE/SPJ  yes     None  2.7    0.0    +--------------+--------+----+--------+------+  SSV CALF PRX  yes     None  2.3    0.0    +--------------+--------+----+--------+------+  SSV CALF MID  yes     None  3.6    3.4    +--------------+--------+----+--------+------+  SSV CALF DST  yes     None  4.9    2.5    +--------------+--------+----+--------+------+    +--------------+--------+-----+-------------+------+  LEFT          Compress SVT Diameter (mm)Reflux                                          (secs)  +--------------+--------+-----+-------------+------+  SFJ           yes     None      7.8      0.5    +--------------+--------+-----+-------------+------+  GSV THIGH PRX yes     None      4.4      0.0    +--------------+--------+-----+-------------+------+  GSV THIGH MID yes     None      3.6      0.0     +--------------+--------+-----+-------------+------+  GSV THIGH DST yes     None      3.6      0.0    +--------------+--------+-----+-------------+------+  GSV KNEE      yes     None      3.6      0.0    +--------------+--------+-----+-------------+------+  GSV CALF UPPERyes     None      3.6      0.0    +--------------+--------+-----+-------------+------+  GSV CALF MID  yes     None      2.3      0.0    +--------------+--------+-----+-------------+------+  GSV CALF LOW  yes     None      2.7      0.0    +--------------+--------+-----+-------------+------+  SSV KNEE/SPJ  yes     None      1.8      0.0    +--------------+--------+-----+-------------+------+  SSV CALF PRX  no      acute                     +--------------+--------+-----+-------------+------+  SSV CALF MID  no      acute                     +--------------+--------+-----+-------------+------+  SSV CALF DST  no      acute                     +--------------+--------+-----+-------------+------+  **can't evaluate      Varicose Veins  +---------------------------+-------------+-------------+  RIGHT Location             Diameter (mm)Reflux (secs)  +---------------------------+-------------+-------------+  distal medial thigh to calf     3.3                    +---------------------------+-------------+-------------+  posteromedial distal calf       3.7          4.0       +---------------------------+-------------+-------------+    +--------------------+-------------+-------------+  LEFT Location       Diameter (mm)Reflux (secs)  +--------------------+-------------+-------------+  proximal medial calf     3.6          2.6       +--------------------+-------------+-------------+      DEEP SYSTEM  +----+--------+-----+--------+----+-------------+      RIGHT   RIGHTLEFT    LEFTLEFT               CompressDVT  CompressDVT Reflux  (secs)  +----+--------+-----+--------+----+-------------+  CFV yes     None yes     None2.7            +----+--------+-----+--------+----+-------------+  FV  yes     None yes     None               +----+--------+-----+--------+----+-------------+  POPVyes     None yes     None               +----+--------+-----+--------+----+-------------+  **can't evaluate    Manoj Hilliard MD.    Electronically signed on 10/13/2023 3:04:44 PM    This study was performed and interpreted by a service accredited by the Intersocietal Accreditation Commission (IAC/Vascular), www.intersocietal.org/vascular    Report generated by [employer].                Final         A/P: Eren James is a 77 year old male with neuropathy. Fat pad biopsy is desired. Risks, benefits and alternatives are discussed.  All questions answered and the patient agrees to proceed as planned.  If this is negative, Dr. Juan ecommends a sural nerve biopsy.  The patient can return here for this if desired.      Lenore Mclaughlin MD FACS  Professor of Surgery  Pager [phone]       [facility] [address] [address] [phone]  Dept: [phone]  ______________________________________________________________________________    Patient: Eren James   : 1945   MRN: [id number]   2023    INVASIVE PROCEDURE SAFETY CHECKLIST    Date: 10/27/2023   Procedure:Fat pad biopsy  Patient Name: Eren James  MRN: [id number]  YOB: 1945    Action: Complete sections as appropriate. Any discrepancy results in a HARD COPY until resolved.     PRE PROCEDURE:  Patient ID verified with 2 identifiers (name and  or MRN): Yes  Procedure and site verified with patient/designee (when able): Yes  Accurate consent documentation in medical record: Yes  H&P (or appropriate assessment) documented in medical record: NA  H&P must be  up to 30 days prior to procedure and updates within 24 hours of procedure as applicable: NA  Relevant diagnostic and radiology test results appropriately labeled and displayed as applicable: NA  Blood products, implants, devices, and or special equipment available for the procedure as applicable: NA   Procedure site(s) marked with provider initials: No, (see below)  Marking not required: Yes  Procedure is in continuous attendance with the patient from consent through completion of procedure    TIMEOUT:  Time-Out performed immediately prior to starting procedure, including verbal and active participation of all team members addressing the following:Yes  * Correct patient identify  * Confirmed that the correct side and site are marked  * An accurate procedure consent form  * Agreement on the procedure to be done  * Correct patient position  * Relevant images and results are properly labeled and appropriately displayed  * The need to administer antibiotics or fluids for irrigation purposes during the procedure as applicable   * Safety precautions based on patient history or medication use    DURING PROCEDURE: Verification of correct person, site, and procedures any time the responsibility for care of the patient is transferred to another member of the care team.         Again, thank you for allowing me to participate in the care of your patient.      Sincerely,    Lenore Mclaughlin MD

## 2023-10-27 NOTE — PROGRESS NOTES
[facility] [address] [address]  [phone]  Dept: [phone]  ______________________________________________________________________________    Patient: Eren James   : 1945   MRN: [id number]   2023    INVASIVE PROCEDURE SAFETY CHECKLIST    Date: 10/27/2023   Procedure:Fat pad biopsy  Patient Name: Eren James  MRN: [id number]  YOB: 1945    Action: Complete sections as appropriate. Any discrepancy results in a HARD COPY until resolved.     PRE PROCEDURE:  Patient ID verified with 2 identifiers (name and  or MRN): Yes  Procedure and site verified with patient/designee (when able): Yes  Accurate consent documentation in medical record: Yes  H&P (or appropriate assessment) documented in medical record: NA  H&P must be up to 30 days prior to procedure and updates within 24 hours of procedure as applicable: NA  Relevant diagnostic and radiology test results appropriately labeled and displayed as applicable: NA  Blood products, implants, devices, and or special equipment available for the procedure as applicable: NA   Procedure site(s) marked with provider initials: No, (see below)  Marking not required: Yes  Procedure is in continuous attendance with the patient from consent through completion of procedure    TIMEOUT:  Time-Out performed immediately prior to starting procedure, including verbal and active participation of all team members addressing the following:Yes  * Correct patient identify  * Confirmed that the correct side and site are marked  * An accurate procedure consent form  * Agreement on the procedure to be done  * Correct patient position  * Relevant images and results are properly labeled and appropriately displayed  * The need to administer antibiotics or fluids for irrigation purposes during the procedure as applicable   * Safety precautions based on patient history or  medication use    DURING PROCEDURE: Verification of correct person, site, and procedures any time the responsibility for care of the patient is transferred to another member of the care team.

## 2023-10-27 NOTE — NURSING NOTE
"Chief Complaint   Patient presents with    New Patient     Fat pad biopsy       Vitals:    10/27/23 0724   BP: (!) 157/87   BP Location: Left arm   Patient Position: Sitting   Cuff Size: Adult Regular   Pulse: 80   SpO2: 92%   Weight: 84.9 kg (187 lb 1.6 oz)   Height: 1.651 m (5' 5\")       Body mass index is 31.14 kg/m .                          Ayan Castañeda, EMT    "

## 2023-10-27 NOTE — PATIENT INSTRUCTIONS
You met with Dr. Lenore Mclaughlin.      Today's visit instructions:    Return to the Surgery Clinic on an as needed basis.    If you have questions please contact Zamzam RN or Muriel RN during regular clinic hours, Monday through Friday 7:30 AM - 4:00 PM, or you can contact us via [employer] at anytime.       If you have urgent needs after-hours, weekends, or holidays please call the hospital at [phone] and ask to speak with our on-call General Surgery Team.    Appointment [phone]  Nurse Advice (Zamzam or Muriel): [phone]   Surgery Scheduler (Jeanne): [phone]  Fax: [phone]    Abdominal Punch Biopsy      Before the procedure:  Do not shave the area where the lump/mass is located.     Incision care   You may take a shower the day after surgery. Carefully wash your incision with soap and water. Do not submerge yourself in water (bath, whirlpool, hot tub, pool, lake) for 14 days after surgery.   Remove the gauze bandage 1-2 days after surgery but leave the medical tape (Steri-Strips) or glue in place. These will loosen and fall off on their own 1-2 weeks after surgery.     Always wash your hands before touching your incisions or removing bandages.   It is not unusual to form a collection of fluid or blood under your incision that may feel firm or squishy- it can take several weeks to months for your body to reabsorb it.  At times, it may even drain.  If that should happen keep the area clean with soap, water,  and cover with a clean gauze dressing. You can change this daily or as needed.     Other medicines   You can continue your regular medicines at your normal time the day after surgery.      For pain or discomfort   Please use over-the-counter medication, use acetaminophen (Tylenol) or ibuprofen (Advil, Motrin) as instructed on the box for discomfort.   Use an ice pack on your surgical cut (incision) for 20 minutes at a time as needed for the first 24 hours. Be sure to protect your  skin by putting a cloth between the ice pack and your skin.      Activities   No driving until you feel it s safe to do so.      Diet   You can eat your regular meals after surgery.      Results   You should know any test results about 3-5 business days after surgery.  Your referring provider will be discussing your results with you.     When to call the doctor   Call your doctor if you have:   A fever above 101 F (38.3 C) (taken under the tongue), or a fever or chills lasting more than a day.   Redness at the incision site.   Any fluid or blood draining from the incision, especially if it smells bad.    Severe pain that doesn t improve with pain medicine.      We will call you 2 to 4 days after surgery to review this handout, answer questions and help arrange after-surgery care. If you have questions or concerns, please call [phone] during regular office hours. If you need to call after business hours, call [phone] and ask to page the surgeon on-call.

## 2023-10-27 NOTE — PROCEDURES
Op Note  Preoperative diagnosis: Possible amyloidosis  Postoperative diagnosis: Same  Operation Abdominal Fat Pad Biopsy  Surgeon: Lenore Mclaughlin MD  Anesthesia: Local  EBL 1 CC  Specimen: Abdominal fat pad biopsy  Comorbidities:   Patient Active Problem List   Diagnosis     Esophageal reflux     Cardiovascular disease     Coronary atherosclerosis     Essential hypertension, benign     Diverticulosis of large intestine     Aneurysm of other visceral artery     Calculus of kidney     Sciatica     Backache     Hyperlipidemia     Depressive disorder, not elsewhere classified     Testicular hypofunction     Trigger finger, acquired     Generalized hyperhidrosis     Insomnia     Peptic ulcer     Hypertrophy of prostate with urinary obstruction      Indications: Eren James is a 77 year old male who presented with unexplained neuropathy. Amyloidosis is being considered, so fat pad biopsy is desired. Risks, benefits, and indications for the procedure were discussed with the patient and informed consent was obtained.  All agreed to proceed with the operation as planned.    Description of the procedure: The patient was positioned comfortably supine on the procedure room table.  A timeout was called and the correct patient, procedure, and surgical site were identified.      After instillation of local anesthesia in a square field block, the patient's abdomen was prepped and draped in the usual sterile fashion. Using a scalpel, a small stab incision was created an a punch biopsy was used to take two pieces of fat.  The skin edges were cauterized with silver nitrate.  The skin was closed with Vicryl.  A steri strip was applied.  A dressing was applied to the site.     The patient tolerated the procedure well and there were no immediate complications noted.

## 2023-10-27 NOTE — PROGRESS NOTES
General Surgery Clinic Note - New Patient Visit    NAME: Eren James,  77 year old male    PCP: aJkob Conner  MRN:   [id number]      #: [phone]  Date: Oct 27, 2023    Chief Complaint: rule out amyloid    History of Present Illness: Eren James is a 77 year old male who presents to the clinic with unexpained neuropathy of the lower extremity.  Fat pad biopsy to rule out amyloid is desired.    Past Medical History: History in Epic reviewed with the patient:  Past Medical History:   Diagnosis Date     Aneurysm of other visceral artery     See PSH     Backache, unspecified     Chronic back pain; takes MS Contin     Esophageal reflux      Hypertrophy of prostate with urinary obstruction and other lower urinary tract symptoms (LUTS)      Other and unspecified hyperlipidemia      Other testicular hypofunction      Unspecified cardiovascular disease     Previous coronary angioplasties     Unspecified essential hypertension      Urinary calculus, unspecified        Past Surgical History: History in Epic reviewed with the patient:  Past Surgical History:   Procedure Laterality Date     [facility] NONSPECIFIC PROCEDURE      Colon surg for diverticulitis     Z NONSPECIFIC PROCEDURE      T&A     Z NONSPECIFIC PROCEDURE      Coronary angioplasties     [facility] NONSPECIFIC PROCEDURE      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 hx bypass.     Cardiovascular Father         .cardiomegaly.     Diabetes Sister                Social History: History in Epic reviewed with the patient:  Social History     Socioeconomic History     Marital status:      Spouse name: Not on file     Number of children: Not on file     Years of education: Not on file     Highest education level: Not on file   Occupational History     Not on file    Tobacco Use     Smoking status: Never     Smokeless tobacco: Never   Substance and Sexual Activity     Alcohol use: No     Drug use: No     Sexual activity: Not on file   Other Topics Concern     Not on file   Social History Narrative     Not on file     Social Determinants of Health     Financial Resource Strain: Not on file   Food Insecurity: Not on file   Transportation Needs: Not on file   Physical Activity: Not on file   Stress: Not on file   Social Connections: Not on file   Interpersonal Safety: Not on file   Housing Stability: Not on file       Allergies:     Allergies   Allergen Reactions     Armodafinil Other (See Comments)     No Known Drug Allergy        Outpatient Medications:  Outpatient Encounter Medications as of 10/27/2023   Medication Sig Dispense Refill     acetaminophen (TYLENOL) 500 MG tablet Take 1,000 mg by mouth       acyclovir (ZOVIRAX) 200 MG capsule TAKE 2 CAPSULES BY MOUTH TWICE DAILY FOR 5 DAYS AS NEEDED FOR COLD SORES       allopurinol (ZYLOPRIM) 300 MG tablet TAKE 1 TABLET(300 MG) BY MOUTH EVERY DAY Strength: 300 mg       ASPIRIN 325 MG OR TBEC Take 81 mg by mouth daily 100 3     atorvastatin (LIPITOR) 40 MG tablet Take 40 mg by mouth daily       B Complex CAPS as directed Orally       buPROPion (WELLBUTRIN XL) 150 MG 24 hr tablet Take 150 mg by mouth every morning       chlorhexidine (PERIDEX) 0.12 % solution SWISH AND SPIT 15 ML BY MOUTH TWICE DAILY       docusate sodium (DSS) 100 MG capsule 1 capsule as needed Orally Once a day for 30 day(s)       DULoxetine (CYMBALTA) 30 MG capsule Take 60 mg by mouth every morning       finasteride (PROSCAR) 5 MG tablet Take 1 tablet by mouth daily at 2 pm       FLOMAX 0.4 MG OR CP24 1 TABLET DAILY AFTER A MEAL 30 11     fluorometholone (FML LIQUIFILM) 0.1 % ophthalmic suspension        fluticasone (FLONASE) 50 MCG/ACT nasal spray SHAKE LIQUID WELL AND INHALE 2 SPRAYS INTO AFFECTED NOSTRILS TWICE DAILY AS NEEDED FOR RHINITIS       gabapentin  (NEURONTIN) 300 MG capsule Take 1 capsule in the morning and take 2 capsules at bedtime       HYDROcodone-acetaminophen (NORCO) 5-325 MG tablet Take 1 tablet by mouth 3 times daily as needed for pain       lidocaine (XYLOCAINE) 5 % external ointment APPLY TOPICALLY TO THE AFFECTED AREA THREE TIMES DAILY AS NEEDED       lubiprostone (AMITIZA) 8 MCG capsule 24 mcg 2 times daily       MULTIVITAMINS OR TABS 1 tablet daily  0     NARCAN 4 MG/0.1ML nasal spray once as needed       nitroGLYcerin (NITROSTAT) 0.4 MG sublingual tablet DISSOLVE 1 TABLET UNDER THE TONGUE AS NEEDED FOR CHEST PAIN EVERY 5 MINUTES AS NEEDED AS DIRECTED       OMEPRAZOLE 40 MG OR CPDR 1 CAPSULE DAILY 30 11     ondansetron (ZOFRAN ODT) 4 MG ODT tab DISSOLVE 1 TABLET(4 MG) ON THE TONGUE EVERY 8 HOURS AS NEEDED FOR NAUSEA OR VOMITING       OXYCONTIN 160 MG OR TB12  60 0     pantoprazole (PROTONIX) 40 MG EC tablet Take 40 mg by mouth daily       potassium chloride (KLOR-CON) 20 MEQ packet 1 packet with food Orally Once a day for 30 day(s)       torsemide (DEMADEX) 20 MG tablet Take 1 tablet by mouth daily       traZODone (DESYREL) 50 MG tablet Take 75 mg by mouth       albuterol (VENTOLIN HFA) 108 (90 Base) MCG/ACT inhaler Inhale 2 puffs into the lungs every 6 hours as needed       Alpha-Lipoic Acid 200 MG TABS Take 200-400 mg by mouth       ALPRAZOLAM 0.5 MG OR TABS 1 po q6 h prn anxiety 50 1     AMBIEN 10 MG OR TABS 1 TABLET AT BEDTIME AS NEEDED (Patient not taking: Reported on 10/23/2023) 30 5     BINAXNOW COVID-19 AG HOME TEST KIT follow package directions       colchicine (COLCRYS) 0.6 MG tablet Take 0.6 mg by mouth       HYDROmorphone (DILAUDID) 4 MG tablet        IMDUR 30 MG OR TB24 1 TABLET EVERY MORNING 30 0     isosorbide mononitrate (IMDUR) 60 MG 24 hr tablet Take 60 mg by mouth daily       LISINOPRIL 40 MG OR TABS 1 tab PO QD (Once per day) (Patient not taking: Reported on 10/23/2023) 30 5     metolazone (ZAROXOLYN) 5 MG tablet TAKE 1 TABLET  "BY MOUTH EVERY DAY AS NEEDED FOR WEIGHT GAIN OR LEG SWELLING       metoprolol succinate ER (TOPROL XL) 25 MG 24 hr tablet  (Patient not taking: Reported on 10/23/2023)       METOPROLOL SUCCINATE TBCR# 50 MG OR take one tablet by mouth daily (Patient taking differently: Take 25 mg by mouth) 30 5     NEXIUM 20 MG OR CPDR 1 CAPSULE BID (Patient not taking: Reported on 10/23/2023) 30 11     PLAVIX 75 MG OR TABS 1 TABLET DAILY (Patient not taking: Reported on 10/23/2023)       TESTOSTERONE CYPIONATE 200 MG/ML IM  MG EVERY 2 WEEKS (Patient not taking: Reported on 10/23/2023) 24 0     UROXATRAL 10 MG OR TB24 1 TABLET DAILY AFTER A MEAL (Patient not taking: Reported on 10/23/2023) 30 0     ZOLOFT 100 MG OR TABS 1 1/2 tabs daily (150 mg total)  MD appointment needed (Patient not taking: Reported on 10/23/2023) 45 0  MD appointment needed     No facility-administered encounter medications on file as of 10/27/2023.       ROS: 10 systems reviewed and all are negative except as above.    EXAM:  BP (!) 157/87 (BP Location: Left arm, Patient Position: Sitting, Cuff Size: Adult Regular)   Pulse 80   Ht 1.651 m (5' 5\")   Wt 84.9 kg (187 lb 1.6 oz)   SpO2 92%   BMI 31.14 kg/m    Psych: Normal affect  Neuro: No gross focal deficits noted  Head:Normocephalic, atraumatic  Eyes: Non icteric  Neck:supple  Heart: Regular rate and rhythm  Lungs: non-labored, quiet respiration  Abdomen: old healed scars. Soft, rounded, suitable fat pad.  Extremities: No obvious deformities    Imaging Data (I have personally reviewed the following reports):  Recent Results (from the past 744 hour(s))   US VENOUS LOWER EXTREMITY RIGHT    Narrative    VASCULAR ULTRASOUND REPORT    CITLALLI MUNOZ  MRN:    [id number] Accession#:   [id number]  :    1945  Study Date:   10/9/2023 11:43:31 AM  Age:    77 years   Tech:         BSG  Gender: M          Referring MD: KARLI RAY      Site: [facility]    Study performed:      Duplex US " DVT study, (right).  Indication for study: LE pain/edema and history of DVT in right LE  Study Quality:        Good      TECHNIQUE:  Lower/upper extremity veins were examined with duplex ultrasound, color-flow and spectral Doppler per exam protocol. Vein compressibility by transducer pressure was used to evaluate presence/absence of DVT/SVT. Venous flow and competence was evaluated by flow augmentation maneuvers per exam protocol. Insufficiency studies were performed with the patient in upright position, with vein diameters measured in mm, and reflux.    IMPRESSION:   1. No evidence of deep or superficial vein thrombosis in the right lower extremity.    COMPARISON:  No prior study available for comparison.    FINDINGS:  Preliminary results were given to Dr Murphy via Secure Chat at the time of the imaging visit.    Right Lower Extremity:  No evidence of deep or superficial vein thrombosis in the right lower extremity.      MEASUREMENTS:    +--------------+--------+-----+--------+----+                 RIGHT  RIGHT  LEFT  LEFT                Compress DVT CompressDVT                          SVT         SVT   +--------------+--------+-----+--------+----+  CFV             yes   None   yes   None  +--------------+--------+-----+--------+----+  PFV             yes   None               +--------------+--------+-----+--------+----+  SAPH/FEM JUNCT  yes   None               +--------------+--------+-----+--------+----+  FV              yes   None               +--------------+--------+-----+--------+----+  POPV            yes   None               +--------------+--------+-----+--------+----+  PTV             yes   None               +--------------+--------+-----+--------+----+  PERONEAL        yes   None               +--------------+--------+-----+--------+----+  GSV             yes   None               +--------------+--------+-----+--------+----+  SSV              yes   None               +--------------+--------+-----+--------+----+    ** = Can't evaluate  Marcus Villalta MD.  [facility]  Electronically signed on 10/9/2023 1:59:13 PM    This study was performed and interpreted by a service accredited by the Intersocietal Accreditation Commission (IAC/Vascular), www.intersocietal.org/vascular    Report generated by [employer].          Final     US VENOUS INSUFFICIENCY LOWER EXTREMITY BILATERAL    Narrative    VASCULAR ULTRASOUND REPORT    CITLALLI MUNOZ  MRN:    [id number] Accession#:   [id number]  :    1945  Study Date:   10/13/2023 1:04:31 PM  Age:    77 years   Tech:         VANESSA  Gender: M          Referring MD: ROMEO HUYNH      Site: [facility]    Study performed:      Duplex US venous insufficiency, (bilateral).  Indication for study: LE pain/edema and varicose veins  Study Quality:        Good      TECHNIQUE:  Lower/upper extremity veins were examined with duplex ultrasound, color-flow and spectral Doppler per exam protocol. Vein compressibility by transducer pressure was used to evaluate presence/absence of DVT/SVT. Venous flow and competence was evaluated by flow augmentation maneuvers per exam protocol. Insufficiency studies were performed with the patient in upright position, with vein diameters measured in mm, and reflux.    IMPRESSION:   1. No evidence of deep vein thrombosis in bilateral lower extremities. Deep reflux was noted in the left common femoral vein.   2. Right GSV borderline segmentally incompetent at the saphenofemoral junction, remainder of GSV is competent.   3. On the right there is an associated branch that courses up the medial calf and is partially thrombosed with age-indeterminate thrombus from knee to mid calf level.   4. Left GSV is patent and borderline segmentally incompetent at the saphenofemoral junction. There is an associated incompetent varicose branch at proximal calf that is  thrombosed near mid medial calf.   5. Right small saphenous vein is patent and segmentally incompetent at mid and distal calf with associated incompetent varicose vein the courses from distal to mid posteromedial calf.   6. 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    COMPARISON:  Compared to prior study 02/07/2023, superfiical thrombophlebitis in both lower extremities is a new finding.    FINDINGS:  No evidence of deep vein thrombosis in bilateral lower extremities. Deep reflux was noted in the left common femoral vein. Imaging of the right superficial veins reveals a greater saphenous vein that is patent and borderline segmentally incompetent at the saphenofemoral junction. There is an associated branch that courses up the medial calf and is partially thrombosed with age-indeterminate thrombus from knee to mid calf level. The small saphenous vein is patent and segmentally incompetent at mid and distal calf with associated incompetent varicose vein the courses from distal to mid posteromedial calf. Imaging of the left superficial veins reveals a greater saphenous vein that is patent and borderline segmentally incompetent at the saphenofemoral junction. There is an associated incompetent varicose branch at proximal calf that is thrombosed near mid medial calf. The small saphenous vein has acute on chronic SVT that is occlusive from proximal to distal calf.  Right Lower Extremity:  No deep venous insufficiency. No evidence of DVT. Varicose vein at distal medial thigh to calf , 3.3 mm diameter. Varicose vein at posteromedial distal calf, 3.7 mm diameter, 4.0 sec reflux.  Left Lower Extremity:  No evidence of DVT. Varicose vein at proximal medial calf, 3.6 mm diameter, 2.6 sec reflux.    MEASUREMENTS:    +--------------+--------+----+--------+------+  RIGHT         CompressSVT DiameterReflux                              (mm)   (secs)  +--------------+--------+----+--------+------+  SFJ           yes     None  6.8    0.5    +--------------+--------+----+--------+------+  GSV THIGH PRX yes     None  4.2    0.0    +--------------+--------+----+--------+------+  GSV THIGH MID yes     None  3.6    0.0    +--------------+--------+----+--------+------+  GSV THIGH DST yes     None  4.1    0.0    +--------------+--------+----+--------+------+  GSV KNEE      yes     None  3.8    0.0    +--------------+--------+----+--------+------+  GSV CALF UPPERyes     None  4.1    0.0    +--------------+--------+----+--------+------+  GSV CALF MID  yes     None  2.5    0.0    +--------------+--------+----+--------+------+  GSV CALF LOW  yes     None  2.6    0.0    +--------------+--------+----+--------+------+  SSV KNEE/SPJ  yes     None  2.7    0.0    +--------------+--------+----+--------+------+  SSV CALF PRX  yes     None  2.3    0.0    +--------------+--------+----+--------+------+  SSV CALF MID  yes     None  3.6    3.4    +--------------+--------+----+--------+------+  SSV CALF DST  yes     None  4.9    2.5    +--------------+--------+----+--------+------+    +--------------+--------+-----+-------------+------+  LEFT          Compress SVT Diameter (mm)Reflux                                          (secs)  +--------------+--------+-----+-------------+------+  SFJ           yes     None      7.8      0.5    +--------------+--------+-----+-------------+------+  GSV THIGH PRX yes     None      4.4      0.0    +--------------+--------+-----+-------------+------+  GSV THIGH MID yes     None      3.6      0.0    +--------------+--------+-----+-------------+------+  GSV THIGH DST yes     None      3.6      0.0    +--------------+--------+-----+-------------+------+  GSV KNEE      yes     None      3.6      0.0     +--------------+--------+-----+-------------+------+  GSV CALF UPPERyes     None      3.6      0.0    +--------------+--------+-----+-------------+------+  GSV CALF MID  yes     None      2.3      0.0    +--------------+--------+-----+-------------+------+  GSV CALF LOW  yes     None      2.7      0.0    +--------------+--------+-----+-------------+------+  SSV KNEE/SPJ  yes     None      1.8      0.0    +--------------+--------+-----+-------------+------+  SSV CALF PRX  no      acute                     +--------------+--------+-----+-------------+------+  SSV CALF MID  no      acute                     +--------------+--------+-----+-------------+------+  SSV CALF DST  no      acute                     +--------------+--------+-----+-------------+------+  **can't evaluate      Varicose Veins  +---------------------------+-------------+-------------+  RIGHT Location             Diameter (mm)Reflux (secs)  +---------------------------+-------------+-------------+  distal medial thigh to calf     3.3                    +---------------------------+-------------+-------------+  posteromedial distal calf       3.7          4.0       +---------------------------+-------------+-------------+    +--------------------+-------------+-------------+  LEFT Location       Diameter (mm)Reflux (secs)  +--------------------+-------------+-------------+  proximal medial calf     3.6          2.6       +--------------------+-------------+-------------+      DEEP SYSTEM  +----+--------+-----+--------+----+-------------+      RIGHT   RIGHTLEFT    LEFTLEFT               CompressDVT  CompressDVT Reflux (secs)  +----+--------+-----+--------+----+-------------+  CFV yes     None yes     None2.7            +----+--------+-----+--------+----+-------------+  FV  yes     None yes     None                +----+--------+-----+--------+----+-------------+  POPVyes     None yes     None               +----+--------+-----+--------+----+-------------+  **can't evaluate    Manoj Hilliard MD.    Electronically signed on 10/13/2023 3:04:44 PM    This study was performed and interpreted by a service accredited by the Intersocietal Accreditation Commission (IAC/Vascular), www.[email or web address]/vascular    Report generated by [employer].                Final         A/P: Eren James is a 77 year old male with neuropathy. Fat pad biopsy is desired. Risks, benefits and alternatives are discussed.  All questions answered and the patient agrees to proceed as planned.  If this is negative, Dr. Juan ecommends a sural nerve biopsy.  The patient can return here for this if desired.      Lenore Mclaughlin MD FACS  Professor of Surgery  Pager [phone]

## 2023-10-27 NOTE — NURSING NOTE
The following medication was given:     MEDICATION:  Lidocaine with epinephrine  ROUTE: SQ  SITE: Abdomen  DOSE: 3 ml  LOT #: [id number]  : TV [employer]  EXPIRATION DATE: 02/24  NDC#: 59305-949-48   Was there drug waste? Yes  Amount of drug waste (mL): 7 ml.  Reason for waste:  As per MD  Multi-dose vial: No    Zamzam Vizcaino RN  October 27, 2023

## 2023-10-31 ENCOUNTER — PATIENT OUTREACH (OUTPATIENT)
Dept: SURGERY | Facility: CLINIC | Age: 78
End: 2023-10-31
Payer: MEDICARE

## 2023-10-31 NOTE — PROGRESS NOTES
Patient underwent abdominal fat pad biopsy with Dr. Mclaughlin. Results are now available to be given to the patient by his referring provider, Dr. Juan.      Pathology:  Case Report   Surgical Pathology Report                         Case: [id number]                                   Authorizing Provider:  Lenore Mclaughlin         Collected:           10/27/2023 08:54 AM                                  MD Misty                                                                 Ordering Location:     [facility]  Received:            10/27/2023 09:28 AM                                  [facility]                                                   Pathologist:           Hoa Astorga MD                                                           Specimen:    Abdomen, Fat Pad Biopsy                                                                    Final Diagnosis   A.  Soft tissue, fat pad, biopsy:  - Benign adipose tissue  - [place] red stain is negative for amyloid      .

## 2023-11-02 ENCOUNTER — TELEPHONE (OUTPATIENT)
Dept: SURGERY | Facility: CLINIC | Age: 78
End: 2023-11-02
Payer: MEDICARE

## 2023-11-02 NOTE — TELEPHONE ENCOUNTER
[facility] Call Center    Phone Message    May a detailed message be left on voicemail: yes     Reason for Call: Other: Eren is calling in asking for a call back. He states that he would like to speak with his care team about the results from his appt with Dr. Mclaughlin on 10/27. Please call back as soon as possible to discuss.     Action Taken: Message routed to:  [facility] ([facility] Gen Surg    Travel Screening: Not Applicable

## 2023-11-02 NOTE — TELEPHONE ENCOUNTER
Attempted to return patients phone call. Did not answer home phone and unable to LVM. Called mobile phone and LVM. Advised that results are negative for amyloid but he will need to discuss further with referring provider, Dr. Juan.

## 2023-11-09 ENCOUNTER — TELEPHONE (OUTPATIENT)
Dept: SURGERY | Facility: CLINIC | Age: 78
End: 2023-11-09
Payer: MEDICARE

## 2023-11-09 ENCOUNTER — PATIENT OUTREACH (OUTPATIENT)
Dept: SURGERY | Facility: CLINIC | Age: 78
End: 2023-11-09
Payer: MEDICARE

## 2023-11-09 NOTE — TELEPHONE ENCOUNTER
Discussed follow up care with General Surgery. Patient had concern of black and blue around biopsy site. Denies pain, tenderness, redness. He will take to his primary at his next appointment that was previously scheduled. Encouraged patient to reach out with any concerns relating to visit with Dr. Mclaughlin.    Keisha Paris RN

## 2023-11-09 NOTE — PROGRESS NOTES
Called patient for the 4th time to review pathology results. Pathology report reviewed. Patient verbalizes understanding. He will see his PCP today and was given Dr. Juan' office phone number to call for next steps. Pathology report mailed to patient per his request. All questions answered.

## 2023-11-20 ENCOUNTER — HOSPITAL ENCOUNTER (OUTPATIENT)
Facility: AMBULATORY SURGERY CENTER | Age: 78
Discharge: HOME | End: 2023-11-20
Attending: PSYCHIATRY & NEUROLOGY
Payer: MEDICARE

## 2023-12-04 ENCOUNTER — HOSPITAL ENCOUNTER (OUTPATIENT)
Facility: CLINIC | Age: 78
Setting detail: OBSERVATION
Discharge: HOME | End: 2023-12-07
Attending: EMERGENCY MEDICINE | Admitting: INTERNAL MEDICINE
Payer: MEDICARE

## 2023-12-04 ENCOUNTER — TRANSFERRED RECORDS (OUTPATIENT)
Dept: HEALTH INFORMATION MANAGEMENT | Facility: CLINIC | Age: 78
End: 2023-12-04

## 2023-12-04 ENCOUNTER — APPOINTMENT (OUTPATIENT)
Dept: CT IMAGING | Facility: CLINIC | Age: 78
End: 2023-12-04
Attending: EMERGENCY MEDICINE
Payer: MEDICARE

## 2023-12-04 DIAGNOSIS — I47.10 PAROXYSMAL SUPRAVENTRICULAR TACHYCARDIA (H): ICD-10-CM

## 2023-12-04 DIAGNOSIS — R00.2 PALPITATIONS: ICD-10-CM

## 2023-12-04 DIAGNOSIS — R06.02 SHORTNESS OF BREATH: ICD-10-CM

## 2023-12-04 DIAGNOSIS — R53.1 GENERALIZED WEAKNESS: ICD-10-CM

## 2023-12-04 DIAGNOSIS — S22.42XA CLOSED FRACTURE OF MULTIPLE RIBS OF LEFT SIDE, INITIAL ENCOUNTER: ICD-10-CM

## 2023-12-04 LAB
ALBUMIN UR-MCNC: NEGATIVE MG/DL
ANION GAP SERPL CALCULATED.3IONS-SCNC: 15 MMOL/L (ref 7–15)
APPEARANCE UR: CLEAR
ATRIAL RATE - MUSE: 109 BPM
BASOPHILS # BLD AUTO: 0 10E3/UL (ref 0–0.2)
BASOPHILS NFR BLD AUTO: 0 %
BILIRUB UR QL STRIP: NEGATIVE
BUN SERPL-MCNC: 15.7 MG/DL (ref 8–23)
CALCIUM SERPL-MCNC: 9.1 MG/DL (ref 8.8–10.2)
CHLORIDE SERPL-SCNC: 101 MMOL/L (ref 98–107)
COLOR UR AUTO: ABNORMAL
CREAT SERPL-MCNC: 0.89 MG/DL (ref 0.67–1.17)
DEPRECATED HCO3 PLAS-SCNC: 23 MMOL/L (ref 22–29)
DIASTOLIC BLOOD PRESSURE - MUSE: NORMAL MMHG
EGFRCR SERPLBLD CKD-EPI 2021: 87 ML/MIN/1.73M2
EOSINOPHIL # BLD AUTO: 0 10E3/UL (ref 0–0.7)
EOSINOPHIL NFR BLD AUTO: 0 %
ERYTHROCYTE [DISTWIDTH] IN BLOOD BY AUTOMATED COUNT: 14 % (ref 10–15)
GLUCOSE SERPL-MCNC: 120 MG/DL (ref 70–99)
GLUCOSE UR STRIP-MCNC: NEGATIVE MG/DL
HCO3 BLDV-SCNC: 27 MMOL/L (ref 21–28)
HCT VFR BLD AUTO: 33.9 % (ref 40–53)
HGB BLD-MCNC: 11.6 G/DL (ref 13.3–17.7)
HGB UR QL STRIP: NEGATIVE
HOLD SPECIMEN: NORMAL
HOLD SPECIMEN: NORMAL
IMM GRANULOCYTES # BLD: 0 10E3/UL
IMM GRANULOCYTES NFR BLD: 1 %
INTERPRETATION ECG - MUSE: NORMAL
KETONES UR STRIP-MCNC: 10 MG/DL
LACTATE BLD-SCNC: 1.9 MMOL/L
LEUKOCYTE ESTERASE UR QL STRIP: NEGATIVE
LYMPHOCYTES # BLD AUTO: 2.1 10E3/UL (ref 0.8–5.3)
LYMPHOCYTES NFR BLD AUTO: 31 %
MAGNESIUM SERPL-MCNC: 1.6 MG/DL (ref 1.7–2.3)
MCH RBC QN AUTO: 30.6 PG (ref 26.5–33)
MCHC RBC AUTO-ENTMCNC: 34.2 G/DL (ref 31.5–36.5)
MCV RBC AUTO: 89 FL (ref 78–100)
MONOCYTES # BLD AUTO: 0.8 10E3/UL (ref 0–1.3)
MONOCYTES NFR BLD AUTO: 11 %
NEUTROPHILS # BLD AUTO: 3.8 10E3/UL (ref 1.6–8.3)
NEUTROPHILS NFR BLD AUTO: 57 %
NITRATE UR QL: NEGATIVE
NRBC # BLD AUTO: 0 10E3/UL
NRBC BLD AUTO-RTO: 0 /100
NT-PROBNP SERPL-MCNC: 821 PG/ML (ref 0–1800)
P AXIS - MUSE: 83 DEGREES
PCO2 BLDV: 26 MM HG (ref 40–50)
PH BLDV: 7.63 [PH] (ref 7.32–7.43)
PH UR STRIP: 8 [PH] (ref 5–7)
PLATELET # BLD AUTO: 358 10E3/UL (ref 150–450)
PO2 BLDV: 21 MM HG (ref 25–47)
POTASSIUM SERPL-SCNC: 3.4 MMOL/L (ref 3.4–5.3)
PR INTERVAL - MUSE: 146 MS
QRS DURATION - MUSE: 86 MS
QT - MUSE: 370 MS
QTC - MUSE: 498 MS
R AXIS - MUSE: 65 DEGREES
RBC # BLD AUTO: 3.79 10E6/UL (ref 4.4–5.9)
RBC URINE: 1 /HPF
SAO2 % BLDV: 50 % (ref 94–100)
SODIUM SERPL-SCNC: 139 MMOL/L (ref 135–145)
SP GR UR STRIP: 1.01 (ref 1–1.03)
SQUAMOUS EPITHELIAL: <1 /HPF
SYSTOLIC BLOOD PRESSURE - MUSE: NORMAL MMHG
T AXIS - MUSE: 58 DEGREES
TROPONIN T SERPL HS-MCNC: 86 NG/L
TROPONIN T SERPL HS-MCNC: 88 NG/L
TSH SERPL DL<=0.005 MIU/L-ACNC: 3.2 UIU/ML (ref 0.3–4.2)
UROBILINOGEN UR STRIP-MCNC: 2 MG/DL
VENTRICULAR RATE- MUSE: 109 BPM
WBC # BLD AUTO: 6.7 10E3/UL (ref 4–11)
WBC URINE: 1 /HPF

## 2023-12-04 PROCEDURE — 250N000013 HC RX MED GY IP 250 OP 250 PS 637: Performed by: EMERGENCY MEDICINE

## 2023-12-04 PROCEDURE — 99223 1ST HOSP IP/OBS HIGH 75: CPT | Mod: AI | Performed by: INTERNAL MEDICINE

## 2023-12-04 PROCEDURE — 96361 HYDRATE IV INFUSION ADD-ON: CPT

## 2023-12-04 PROCEDURE — 36415 COLL VENOUS BLD VENIPUNCTURE: CPT | Performed by: EMERGENCY MEDICINE

## 2023-12-04 PROCEDURE — 258N000003 HC RX IP 258 OP 636: Performed by: EMERGENCY MEDICINE

## 2023-12-04 PROCEDURE — 96375 TX/PRO/DX INJ NEW DRUG ADDON: CPT | Mod: 59

## 2023-12-04 PROCEDURE — 96365 THER/PROPH/DIAG IV INF INIT: CPT | Mod: 59

## 2023-12-04 PROCEDURE — 93005 ELECTROCARDIOGRAM TRACING: CPT

## 2023-12-04 PROCEDURE — 99291 CRITICAL CARE FIRST HOUR: CPT | Mod: 25

## 2023-12-04 PROCEDURE — 74177 CT ABD & PELVIS W/CONTRAST: CPT | Mod: MG

## 2023-12-04 PROCEDURE — G1010 CDSM STANSON: HCPCS

## 2023-12-04 PROCEDURE — 85004 AUTOMATED DIFF WBC COUNT: CPT | Performed by: EMERGENCY MEDICINE

## 2023-12-04 PROCEDURE — 83880 ASSAY OF NATRIURETIC PEPTIDE: CPT | Performed by: EMERGENCY MEDICINE

## 2023-12-04 PROCEDURE — 250N000009 HC RX 250: Performed by: EMERGENCY MEDICINE

## 2023-12-04 PROCEDURE — G0378 HOSPITAL OBSERVATION PER HR: HCPCS

## 2023-12-04 PROCEDURE — 82310 ASSAY OF CALCIUM: CPT | Performed by: EMERGENCY MEDICINE

## 2023-12-04 PROCEDURE — 250N000011 HC RX IP 250 OP 636: Performed by: EMERGENCY MEDICINE

## 2023-12-04 PROCEDURE — 84443 ASSAY THYROID STIM HORMONE: CPT | Performed by: EMERGENCY MEDICINE

## 2023-12-04 PROCEDURE — 81001 URINALYSIS AUTO W/SCOPE: CPT | Performed by: EMERGENCY MEDICINE

## 2023-12-04 PROCEDURE — 82803 BLOOD GASES ANY COMBINATION: CPT

## 2023-12-04 PROCEDURE — 84484 ASSAY OF TROPONIN QUANT: CPT | Performed by: EMERGENCY MEDICINE

## 2023-12-04 PROCEDURE — 83735 ASSAY OF MAGNESIUM: CPT | Performed by: EMERGENCY MEDICINE

## 2023-12-04 PROCEDURE — 250N000011 HC RX IP 250 OP 636: Mod: JZ | Performed by: EMERGENCY MEDICINE

## 2023-12-04 RX ORDER — METOPROLOL TARTRATE 1 MG/ML
5 INJECTION, SOLUTION INTRAVENOUS ONCE
Status: COMPLETED | OUTPATIENT
Start: 2023-12-04 | End: 2023-12-04

## 2023-12-04 RX ORDER — MAGNESIUM SULFATE HEPTAHYDRATE 40 MG/ML
2 INJECTION, SOLUTION INTRAVENOUS ONCE
Status: COMPLETED | OUTPATIENT
Start: 2023-12-04 | End: 2023-12-04

## 2023-12-04 RX ORDER — ISOSORBIDE MONONITRATE 60 MG/1
90 TABLET, EXTENDED RELEASE ORAL DAILY
COMMUNITY
End: 2024-03-21

## 2023-12-04 RX ORDER — ASPIRIN 81 MG/1
324 TABLET, CHEWABLE ORAL ONCE
Status: DISCONTINUED | OUTPATIENT
Start: 2023-12-04 | End: 2023-12-04

## 2023-12-04 RX ORDER — METOPROLOL SUCCINATE 25 MG/1
25 TABLET, EXTENDED RELEASE ORAL DAILY
Status: ON HOLD | COMMUNITY
End: 2023-12-07

## 2023-12-04 RX ORDER — DICYCLOMINE HCL 20 MG
20 TABLET ORAL 3 TIMES DAILY PRN
COMMUNITY

## 2023-12-04 RX ORDER — HYDROCODONE BITARTRATE AND ACETAMINOPHEN 5; 325 MG/1; MG/1
2 TABLET ORAL ONCE
Status: COMPLETED | OUTPATIENT
Start: 2023-12-04 | End: 2023-12-04

## 2023-12-04 RX ORDER — IOPAMIDOL 755 MG/ML
94 INJECTION, SOLUTION INTRAVASCULAR ONCE
Status: COMPLETED | OUTPATIENT
Start: 2023-12-04 | End: 2023-12-04

## 2023-12-04 RX ORDER — METOPROLOL SUCCINATE 25 MG/1
25 TABLET, EXTENDED RELEASE ORAL DAILY
Status: DISCONTINUED | OUTPATIENT
Start: 2023-12-05 | End: 2023-12-07 | Stop reason: HOSPADM

## 2023-12-04 RX ORDER — ASPIRIN 81 MG/1
81 TABLET ORAL DAILY
Status: ON HOLD | COMMUNITY
End: 2023-12-24

## 2023-12-04 RX ORDER — LOSARTAN POTASSIUM 100 MG/1
100 TABLET ORAL DAILY
Status: ON HOLD | COMMUNITY
End: 2023-12-07

## 2023-12-04 RX ADMIN — HYDROCODONE BITARTRATE AND ACETAMINOPHEN 2 TABLET: 5; 325 TABLET ORAL at 21:04

## 2023-12-04 RX ADMIN — METOPROLOL TARTRATE 5 MG: 5 INJECTION INTRAVENOUS at 19:06

## 2023-12-04 RX ADMIN — MAGNESIUM SULFATE HEPTAHYDRATE 2 G: 40 INJECTION, SOLUTION INTRAVENOUS at 20:16

## 2023-12-04 RX ADMIN — IOPAMIDOL 94 ML: 755 INJECTION, SOLUTION INTRAVENOUS at 20:23

## 2023-12-04 RX ADMIN — SODIUM CHLORIDE, POTASSIUM CHLORIDE, SODIUM LACTATE AND CALCIUM CHLORIDE 1000 ML: 600; 310; 30; 20 INJECTION, SOLUTION INTRAVENOUS at 19:06

## 2023-12-04 RX ADMIN — SODIUM CHLORIDE 69 ML: 9 INJECTION, SOLUTION INTRAVENOUS at 20:23

## 2023-12-04 ASSESSMENT — ACTIVITIES OF DAILY LIVING (ADL)
ADLS_ACUITY_SCORE: 35

## 2023-12-04 ASSESSMENT — ENCOUNTER SYMPTOMS
ABDOMINAL PAIN: 0
PALPITATIONS: 1
COUGH: 0
BLOOD IN STOOL: 0
NAUSEA: 0
LIGHT-HEADEDNESS: 1
VOMITING: 0
SHORTNESS OF BREATH: 1
RHINORRHEA: 0
DIARRHEA: 0
FEVER: 0
DYSURIA: 0
CHILLS: 1

## 2023-12-05 ENCOUNTER — APPOINTMENT (OUTPATIENT)
Dept: PHYSICAL THERAPY | Facility: CLINIC | Age: 78
End: 2023-12-05
Attending: PHYSICIAN ASSISTANT
Payer: MEDICARE

## 2023-12-05 LAB
ANION GAP SERPL CALCULATED.3IONS-SCNC: 11 MMOL/L (ref 7–15)
BUN SERPL-MCNC: 16.7 MG/DL (ref 8–23)
CALCIUM SERPL-MCNC: 8.7 MG/DL (ref 8.8–10.2)
CHLORIDE SERPL-SCNC: 104 MMOL/L (ref 98–107)
CREAT SERPL-MCNC: 0.75 MG/DL (ref 0.67–1.17)
DEPRECATED HCO3 PLAS-SCNC: 24 MMOL/L (ref 22–29)
EGFRCR SERPLBLD CKD-EPI 2021: >90 ML/MIN/1.73M2
GLUCOSE SERPL-MCNC: 104 MG/DL (ref 70–99)
MAGNESIUM SERPL-MCNC: 2.5 MG/DL (ref 1.7–2.3)
POTASSIUM SERPL-SCNC: 4.3 MMOL/L (ref 3.4–5.3)
SODIUM SERPL-SCNC: 139 MMOL/L (ref 135–145)

## 2023-12-05 PROCEDURE — G0378 HOSPITAL OBSERVATION PER HR: HCPCS

## 2023-12-05 PROCEDURE — 96375 TX/PRO/DX INJ NEW DRUG ADDON: CPT

## 2023-12-05 PROCEDURE — 82310 ASSAY OF CALCIUM: CPT | Performed by: INTERNAL MEDICINE

## 2023-12-05 PROCEDURE — 36415 COLL VENOUS BLD VENIPUNCTURE: CPT | Performed by: INTERNAL MEDICINE

## 2023-12-05 PROCEDURE — 96376 TX/PRO/DX INJ SAME DRUG ADON: CPT

## 2023-12-05 PROCEDURE — 250N000013 HC RX MED GY IP 250 OP 250 PS 637: Performed by: INTERNAL MEDICINE

## 2023-12-05 PROCEDURE — 250N000011 HC RX IP 250 OP 636: Performed by: INTERNAL MEDICINE

## 2023-12-05 PROCEDURE — 250N000013 HC RX MED GY IP 250 OP 250 PS 637: Performed by: STUDENT IN AN ORGANIZED HEALTH CARE EDUCATION/TRAINING PROGRAM

## 2023-12-05 PROCEDURE — 97116 GAIT TRAINING THERAPY: CPT | Mod: GP

## 2023-12-05 PROCEDURE — 250N000009 HC RX 250: Performed by: INTERNAL MEDICINE

## 2023-12-05 PROCEDURE — 250N000011 HC RX IP 250 OP 636: Performed by: PHYSICIAN ASSISTANT

## 2023-12-05 PROCEDURE — 97530 THERAPEUTIC ACTIVITIES: CPT | Mod: GP

## 2023-12-05 PROCEDURE — 97161 PT EVAL LOW COMPLEX 20 MIN: CPT | Mod: GP

## 2023-12-05 PROCEDURE — 83735 ASSAY OF MAGNESIUM: CPT | Performed by: PHYSICIAN ASSISTANT

## 2023-12-05 PROCEDURE — 99232 SBSQ HOSP IP/OBS MODERATE 35: CPT | Mod: FS | Performed by: PHYSICIAN ASSISTANT

## 2023-12-05 RX ORDER — BISACODYL 10 MG
10 SUPPOSITORY, RECTAL RECTAL DAILY PRN
Status: DISCONTINUED | OUTPATIENT
Start: 2023-12-05 | End: 2023-12-07 | Stop reason: HOSPADM

## 2023-12-05 RX ORDER — FENTANYL CITRATE 50 UG/ML
0.25 INJECTION, SOLUTION INTRAMUSCULAR; INTRAVENOUS
Status: DISCONTINUED | OUTPATIENT
Start: 2023-12-05 | End: 2023-12-07 | Stop reason: HOSPADM

## 2023-12-05 RX ORDER — BUPROPION HYDROCHLORIDE 150 MG/1
150 TABLET ORAL EVERY MORNING
Status: DISCONTINUED | OUTPATIENT
Start: 2023-12-05 | End: 2023-12-07 | Stop reason: HOSPADM

## 2023-12-05 RX ORDER — PROCHLORPERAZINE MALEATE 5 MG
5 TABLET ORAL EVERY 6 HOURS PRN
Status: DISCONTINUED | OUTPATIENT
Start: 2023-12-05 | End: 2023-12-07 | Stop reason: HOSPADM

## 2023-12-05 RX ORDER — NALOXONE HYDROCHLORIDE 0.4 MG/ML
0.4 INJECTION, SOLUTION INTRAMUSCULAR; INTRAVENOUS; SUBCUTANEOUS
Status: DISCONTINUED | OUTPATIENT
Start: 2023-12-05 | End: 2023-12-07 | Stop reason: HOSPADM

## 2023-12-05 RX ORDER — ONDANSETRON 2 MG/ML
4 INJECTION INTRAMUSCULAR; INTRAVENOUS EVERY 6 HOURS PRN
Status: DISCONTINUED | OUTPATIENT
Start: 2023-12-05 | End: 2023-12-07 | Stop reason: HOSPADM

## 2023-12-05 RX ORDER — TAMSULOSIN HYDROCHLORIDE 0.4 MG/1
0.4 CAPSULE ORAL DAILY
Status: DISCONTINUED | OUTPATIENT
Start: 2023-12-05 | End: 2023-12-07 | Stop reason: HOSPADM

## 2023-12-05 RX ORDER — NALOXONE HYDROCHLORIDE 0.4 MG/ML
0.2 INJECTION, SOLUTION INTRAMUSCULAR; INTRAVENOUS; SUBCUTANEOUS
Status: DISCONTINUED | OUTPATIENT
Start: 2023-12-05 | End: 2023-12-07 | Stop reason: HOSPADM

## 2023-12-05 RX ORDER — POTASSIUM CHLORIDE 1500 MG/1
20 TABLET, EXTENDED RELEASE ORAL DAILY
Status: DISCONTINUED | OUTPATIENT
Start: 2023-12-05 | End: 2023-12-07 | Stop reason: HOSPADM

## 2023-12-05 RX ORDER — MAGNESIUM OXIDE 400 MG/1
400 TABLET ORAL DAILY
Status: DISCONTINUED | OUTPATIENT
Start: 2023-12-05 | End: 2023-12-07 | Stop reason: HOSPADM

## 2023-12-05 RX ORDER — FINASTERIDE 5 MG/1
5 TABLET, FILM COATED ORAL DAILY
Status: DISCONTINUED | OUTPATIENT
Start: 2023-12-05 | End: 2023-12-07 | Stop reason: HOSPADM

## 2023-12-05 RX ORDER — POTASSIUM CHLORIDE
20 CRYSTALS MISCELLANEOUS DAILY
Status: DISCONTINUED | OUTPATIENT
Start: 2023-12-05 | End: 2023-12-05

## 2023-12-05 RX ORDER — ISOSORBIDE MONONITRATE 60 MG/1
60 TABLET, EXTENDED RELEASE ORAL DAILY
Status: DISCONTINUED | OUTPATIENT
Start: 2023-12-05 | End: 2023-12-07 | Stop reason: HOSPADM

## 2023-12-05 RX ORDER — METOPROLOL TARTRATE 1 MG/ML
5 INJECTION, SOLUTION INTRAVENOUS EVERY 5 MIN PRN
Status: DISCONTINUED | OUTPATIENT
Start: 2023-12-05 | End: 2023-12-07 | Stop reason: HOSPADM

## 2023-12-05 RX ORDER — POLYETHYLENE GLYCOL 3350 17 G/17G
17 POWDER, FOR SOLUTION ORAL 2 TIMES DAILY PRN
Status: DISCONTINUED | OUTPATIENT
Start: 2023-12-05 | End: 2023-12-07 | Stop reason: HOSPADM

## 2023-12-05 RX ORDER — FENTANYL CITRATE 50 UG/ML
0.25 INJECTION, SOLUTION INTRAMUSCULAR; INTRAVENOUS
Status: DISCONTINUED | OUTPATIENT
Start: 2023-12-05 | End: 2023-12-05

## 2023-12-05 RX ORDER — ACETAMINOPHEN 650 MG/1
650 SUPPOSITORY RECTAL EVERY 4 HOURS PRN
Status: DISCONTINUED | OUTPATIENT
Start: 2023-12-05 | End: 2023-12-07 | Stop reason: HOSPADM

## 2023-12-05 RX ORDER — LIDOCAINE 40 MG/G
CREAM TOPICAL
Status: COMPLETED | OUTPATIENT
Start: 2023-12-05 | End: 2023-12-05

## 2023-12-05 RX ORDER — PROCHLORPERAZINE 25 MG
12.5 SUPPOSITORY, RECTAL RECTAL EVERY 12 HOURS PRN
Status: DISCONTINUED | OUTPATIENT
Start: 2023-12-05 | End: 2023-12-07 | Stop reason: HOSPADM

## 2023-12-05 RX ORDER — ONDANSETRON 4 MG/1
4 TABLET, ORALLY DISINTEGRATING ORAL EVERY 6 HOURS PRN
Status: DISCONTINUED | OUTPATIENT
Start: 2023-12-05 | End: 2023-12-07 | Stop reason: HOSPADM

## 2023-12-05 RX ORDER — AMOXICILLIN 250 MG
1 CAPSULE ORAL 2 TIMES DAILY PRN
Status: DISCONTINUED | OUTPATIENT
Start: 2023-12-05 | End: 2023-12-07 | Stop reason: HOSPADM

## 2023-12-05 RX ORDER — AMOXICILLIN 250 MG
2 CAPSULE ORAL 2 TIMES DAILY PRN
Status: DISCONTINUED | OUTPATIENT
Start: 2023-12-05 | End: 2023-12-07 | Stop reason: HOSPADM

## 2023-12-05 RX ORDER — LIDOCAINE 40 MG/G
CREAM TOPICAL
Status: DISCONTINUED | OUTPATIENT
Start: 2023-12-05 | End: 2023-12-07 | Stop reason: HOSPADM

## 2023-12-05 RX ORDER — PANTOPRAZOLE SODIUM 40 MG/1
40 TABLET, DELAYED RELEASE ORAL DAILY
Status: DISCONTINUED | OUTPATIENT
Start: 2023-12-05 | End: 2023-12-07 | Stop reason: HOSPADM

## 2023-12-05 RX ORDER — ASPIRIN 81 MG/1
81 TABLET ORAL DAILY
Status: DISCONTINUED | OUTPATIENT
Start: 2023-12-05 | End: 2023-12-07 | Stop reason: HOSPADM

## 2023-12-05 RX ORDER — DULOXETIN HYDROCHLORIDE 60 MG/1
60 CAPSULE, DELAYED RELEASE ORAL EVERY MORNING
Status: DISCONTINUED | OUTPATIENT
Start: 2023-12-05 | End: 2023-12-07 | Stop reason: HOSPADM

## 2023-12-05 RX ORDER — ACETAMINOPHEN 325 MG/1
650 TABLET ORAL EVERY 4 HOURS PRN
Status: DISCONTINUED | OUTPATIENT
Start: 2023-12-05 | End: 2023-12-07 | Stop reason: HOSPADM

## 2023-12-05 RX ORDER — LANOLIN ALCOHOL/MO/W.PET/CERES
3 CREAM (GRAM) TOPICAL
Status: DISCONTINUED | OUTPATIENT
Start: 2023-12-05 | End: 2023-12-07 | Stop reason: HOSPADM

## 2023-12-05 RX ORDER — CARBOXYMETHYLCELLULOSE SODIUM 5 MG/ML
1 SOLUTION/ DROPS OPHTHALMIC 3 TIMES DAILY PRN
Status: DISCONTINUED | OUTPATIENT
Start: 2023-12-05 | End: 2023-12-07 | Stop reason: HOSPADM

## 2023-12-05 RX ORDER — LOSARTAN POTASSIUM 100 MG/1
100 TABLET ORAL DAILY
Status: DISCONTINUED | OUTPATIENT
Start: 2023-12-05 | End: 2023-12-06

## 2023-12-05 RX ADMIN — FENTANYL CITRATE 21 MCG: 50 INJECTION, SOLUTION INTRAMUSCULAR; INTRAVENOUS at 13:57

## 2023-12-05 RX ADMIN — FINASTERIDE 5 MG: 5 TABLET, FILM COATED ORAL at 14:58

## 2023-12-05 RX ADMIN — METOPROLOL SUCCINATE 25 MG: 25 TABLET, EXTENDED RELEASE ORAL at 02:10

## 2023-12-05 RX ADMIN — ASPIRIN 81 MG: 81 TABLET, COATED ORAL at 08:53

## 2023-12-05 RX ADMIN — FENTANYL CITRATE 21 MCG: 50 INJECTION INTRAMUSCULAR; INTRAVENOUS at 04:12

## 2023-12-05 RX ADMIN — TAMSULOSIN HYDROCHLORIDE 0.4 MG: 0.4 CAPSULE ORAL at 08:54

## 2023-12-05 RX ADMIN — POTASSIUM CHLORIDE 20 MEQ: 1500 TABLET, EXTENDED RELEASE ORAL at 08:54

## 2023-12-05 RX ADMIN — LIDOCAINE 4%: 4 CREAM TOPICAL at 22:33

## 2023-12-05 RX ADMIN — ACETAMINOPHEN 650 MG: 325 TABLET, FILM COATED ORAL at 14:39

## 2023-12-05 RX ADMIN — PANTOPRAZOLE SODIUM 40 MG: 40 TABLET, DELAYED RELEASE ORAL at 08:53

## 2023-12-05 RX ADMIN — FENTANYL CITRATE 21 MCG: 50 INJECTION, SOLUTION INTRAMUSCULAR; INTRAVENOUS at 22:28

## 2023-12-05 RX ADMIN — FENTANYL CITRATE 21 MCG: 50 INJECTION, SOLUTION INTRAMUSCULAR; INTRAVENOUS at 19:50

## 2023-12-05 RX ADMIN — CARBOXYMETHYLCELLULOSE SODIUM 1 DROP: 5 SOLUTION/ DROPS OPHTHALMIC at 22:32

## 2023-12-05 RX ADMIN — ISOSORBIDE MONONITRATE 60 MG: 60 TABLET, EXTENDED RELEASE ORAL at 08:53

## 2023-12-05 RX ADMIN — FENTANYL CITRATE 21 MCG: 50 INJECTION INTRAMUSCULAR; INTRAVENOUS at 09:24

## 2023-12-05 RX ADMIN — FENTANYL CITRATE 21 MCG: 50 INJECTION INTRAMUSCULAR; INTRAVENOUS at 01:46

## 2023-12-05 RX ADMIN — LOSARTAN POTASSIUM 100 MG: 100 TABLET, FILM COATED ORAL at 08:53

## 2023-12-05 RX ADMIN — DULOXETINE HYDROCHLORIDE 60 MG: 60 CAPSULE, DELAYED RELEASE ORAL at 08:54

## 2023-12-05 RX ADMIN — BUPROPION HYDROCHLORIDE 150 MG: 150 TABLET, FILM COATED, EXTENDED RELEASE ORAL at 08:53

## 2023-12-05 RX ADMIN — FENTANYL CITRATE 21 MCG: 50 INJECTION, SOLUTION INTRAMUSCULAR; INTRAVENOUS at 17:24

## 2023-12-05 RX ADMIN — Medication 400 MG: at 08:53

## 2023-12-05 RX ADMIN — NALOXEGOL OXALATE 25 MG: 25 TABLET, FILM COATED ORAL at 08:54

## 2023-12-05 ASSESSMENT — ENCOUNTER SYMPTOMS
HEADACHES: 0
FREQUENCY: 1
BACK PAIN: 1
WEAKNESS: 1
FATIGUE: 1
NECK PAIN: 0

## 2023-12-05 ASSESSMENT — ACTIVITIES OF DAILY LIVING (ADL)
ADLS_ACUITY_SCORE: 35
ADLS_ACUITY_SCORE: 33
ADLS_ACUITY_SCORE: 35
ADLS_ACUITY_SCORE: 36
ADLS_ACUITY_SCORE: 35

## 2023-12-05 NOTE — ED NOTES
[facility]  ED Nurse Handoff Report    ED Chief complaint: Palpitations, Shortness of Breath, and Generalized Weakness      ED Diagnosis:   Final diagnoses:   Paroxysmal supraventricular tachycardia   Shortness of breath   Palpitations       Code Status: not addressed at this time    Allergies:   Allergies   Allergen Reactions    Armodafinil Other (See Comments)    No Known Drug Allergy        Patient Story: Brought by EMS for c/o palpitations, SOB, and generalized weakness. Per EMS pt heart rhythm was in the 170's. He then converted back to SR en route. Blood sugar was 166.      Focused Assessment:  VSS, respirations even and unlabored on RA. Monitoring troponin. Norco for chronic back pain (pt states he has an implanted fentanyl pump but is unable to give himself boluses). Mg replaced.    Labs Ordered and Resulted from Time of ED Arrival to Time of ED Departure   BASIC METABOLIC PANEL - Abnormal       Result Value    Sodium 139      Potassium 3.4      Chloride 101      Carbon Dioxide (CO2) 23      Anion Gap 15      Urea Nitrogen 15.7      Creatinine 0.89      GFR Estimate 87      Calcium 9.1      Glucose 120 (*)    TROPONIN T, HIGH SENSITIVITY - Abnormal    Troponin T, High Sensitivity 88 (*)    MAGNESIUM - Abnormal    Magnesium 1.6 (*)    ROUTINE UA WITH MICROSCOPIC REFLEX TO CULTURE - Abnormal    Color Urine Light Yellow      Appearance Urine Clear      Glucose Urine Negative      Bilirubin Urine Negative      Ketones Urine 10 (*)     Specific Gravity Urine 1.015      Blood Urine Negative      pH Urine 8.0 (*)     Protein Albumin Urine Negative      Urobilinogen Urine 2.0      Nitrite Urine Negative      Leukocyte Esterase Urine Negative      RBC Urine 1      WBC Urine 1      Squamous Epithelials Urine <1     CBC WITH PLATELETS AND DIFFERENTIAL - Abnormal    WBC Count 6.7      RBC Count 3.79 (*)     Hemoglobin 11.6 (*)     Hematocrit 33.9 (*)     MCV 89      MCH 30.6      MCHC 34.2      RDW 14.0       Platelet Count 358      % Neutrophils 57      % Lymphocytes 31      % Monocytes 11      % Eosinophils 0      % Basophils 0      % Immature Granulocytes 1      NRBCs per 100 WBC 0      Absolute Neutrophils 3.8      Absolute Lymphocytes 2.1      Absolute Monocytes 0.8      Absolute Eosinophils 0.0      Absolute Basophils 0.0      Absolute Immature Granulocytes 0.0      Absolute NRBCs 0.0     ISTAT GASES LACTATE VENOUS POCT - Abnormal    Lactic Acid POCT 1.9      Bicarbonate Venous POCT 27      O2 Sat, Venous POCT 50 (*)     pCO2 Venous POCT 26 (*)     pH Venous POCT 7.63 (*)     pO2 Venous POCT 21 (*)    NT PROBNP INPATIENT - Normal    N terminal Pro BNP Inpatient 821     TSH WITH FREE T4 REFLEX - Normal    TSH 3.20     TROPONIN T, HIGH SENSITIVITY     CT Chest (PE) Abdomen Pelvis w Contrast   Final Result   IMPRESSION:   1.  No acute findings or other explanation for symptoms.      2.  Minimally displaced fractures of the left eighth and ninth ribs laterally.      3.  Volume loss and scarring in the right upper lobe, likely from prior infection or inflammation.            Treatments and/or interventions provided: see above  Patient's response to treatments and/or interventions: see above    To be done/followed up on inpatient unit:  follow admitting MD orders    Does this patient have any cognitive concerns?:  none    Activity level - Baseline/Home:  Independent  Activity Level - Current:   Stand with Assist    Patient's Preferred language: English   Needed?: No    Isolation: None  Infection: Not Applicable  Patient tested for COVID 19 prior to admission: NO  Bariatric?: No    Vital Signs:   Vitals:    12/04/23 1917 12/04/23 2016 12/04/23 2100 12/04/23 2115   BP:  123/84 (!) 152/88    Pulse: 89 85 85 71   Resp: 10  14 14   Temp:       TempSrc:       SpO2: 99% 98% 96% 100%       Cardiac Rhythm:Cardiac Rhythm: Supraventricular tachycardia (at times)    Was the PSS-3 completed:   Yes  What interventions  are required if any?               Family Comments: none    For the majority of the shift this patient's behavior was Green.     ED NURSE PHONE NUMBER: [phone]

## 2023-12-05 NOTE — ED PROVIDER NOTES
History     Chief Complaint:  Palpitations, Shortness of Breath, and Generalized Weakness       The history is provided by the patient and the EMS personnel.      Eren James is a 78 year old male who presents via EMS with intermittent SVT and shortness of breath. The patient reports that he 1 week ago he fell on the table in his house, then later went in to the ED for shortness of breath where he was diagnosed with 3 broken ribs. He was discharged 2 days ago, and he felt fine until yesterday evening, when he again had onset of shortness of breath, prompting him to come in to the ED at [place], where he was discharged despite requesting to be admitted. He went home and gave himself a bolus, which improved his symptoms. Today he was brought in via EMS due to intermittent SVT along with the shortness of breath. EMS reports that the patient was never hypoxic. They administered 1 dose of aspirin on the way. They state his blood sugar was 166. Eren notes he feels lightheaded with the tachycardia, and also notes some urinary frequency, and some chills, but denies any fever, rhinorrhea, congestion, cough, chest pain, nausea, vomiting, abdominal pain, dysuria, hematochezia, diarrhea, or melena. Denies any blood thinner use, only aspirin.     Review of Systems   Constitutional:  Positive for chills and fatigue. Negative for fever.   HENT:  Negative for congestion and rhinorrhea.    Respiratory:  Positive for shortness of breath. Negative for cough.    Cardiovascular:  Positive for palpitations. Negative for chest pain.   Gastrointestinal:  Negative for abdominal pain, blood in stool, diarrhea, nausea and vomiting.   Genitourinary:  Positive for frequency and urgency. Negative for dysuria.   Musculoskeletal:  Positive for back pain. Negative for neck pain.   Neurological:  Positive for weakness and light-headedness. Negative for headaches.     Independent Historian:   EMS - supplemented history    Review of External  Notes:   I reviewed the patient's ED note from 12/3/23.    Medications:    Zovirax  Albuterol  Alprazolam  Ambien  Aspirin 325 mg  Lipitor  Wellbutrin  Colchicine  Duloxetine  Proscar  Flomax  DSS  Gabapentin  Imdur  Lisinopril  Amitizia  Metolazone  Metoprolol  Nexium  Nitrostat  Omeprazole  Pantoprazole  Plavix  Testosterone  Trazodone  Torsemide  Uroxatral    Past Medical History:    Aneurysm of other visceral artery  Esophageal reflux  Hypertrophy of prostate   Hyperlipidemia   Hypertension   Other testicular hypofunction  Unspecified cardiovascular disease  Urinary calculus  Coronary atherosclerosis  Diverticulosis of large intestine  Calculus of kidney  Sciatica  Depressive disorder, not elsewhere classified  Trigger finger, acquired  Generalized hyperhidrosis  Insomnia  Peptic ulcer  Rib fracture     Past Surgical History:    Colon surgery for diverticulitis  Tonsillectomy  Adenoidectomy  Coronary angioplastics  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   HENT:      Head: Normocephalic and atraumatic.   Eyes:      Extraocular Movements: Extraocular movements intact.      Conjunctiva/sclera: Conjunctivae normal.   Cardiovascular:      Rate and Rhythm: Tachycardic rate and intermittent runs of SVT on monitor.  Pulmonary:      Effort: Pulmonary effort is normal. No respiratory distress.      Breath sounds: Clear to auscultation bilaterally.  Abdominal:      General: Abdomen is flat. There is no distension.      Palpations:  Abdomen is soft.      Tenderness: There is no abdominal tenderness.   Musculoskeletal:      Cervical back: Normal range of motion. No rigidity.       Right lower leg: Minimal edema.      Left lower leg: Minimal edema.   Skin:     General: Skin is warm and dry.   Neurological:      General: No focal deficit present.      Mental Status: Alert and oriented to person, place, and time.   Psychiatric:         Mood and Affect: Mood normal.         Behavior: Behavior normal.    Emergency Department Course   ECG  ECG results from 12/04/23   EKG 12-lead, tracing only     Value    Systolic Blood Pressure     Diastolic Blood Pressure     Ventricular Rate 109    Atrial Rate 109    MS Interval 146    QRS Duration 86        QTc 498    P Axis 83    R AXIS 65    T Axis 58    Interpretation ECG      Sinus tachycardia with occasional , and consecutive Premature ventricular complexes  Abnormal ECG  When compared with ECG of 23-MAR-2007 07:16,  Premature ventricular complexes are now Present  Vent. rate has increased BY  53 BPM  QT has lengthened  Confirmed by GENERATED REPORT, COMPUTER ([id number]),  [place] Rubio ([id number]) on 12/4/2023 6:59:10 PM        See ED course for independent interpretation.     Imaging:  CT Chest (PE) Abdomen Pelvis w Contrast   Final Result   IMPRESSION:   1.  No acute findings or other explanation for symptoms.      2.  Minimally displaced fractures of the left eighth and ninth ribs laterally.      3.  Volume loss and scarring in the right upper lobe, likely from prior infection or inflammation.         Report per radiology    Laboratory:  Labs Ordered and Resulted from Time of ED Arrival to Time of ED Departure   BASIC METABOLIC PANEL - Abnormal       Result Value    Sodium 139      Potassium 3.4      Chloride 101      Carbon Dioxide (CO2) 23      Anion Gap 15      Urea Nitrogen 15.7      Creatinine 0.89      GFR Estimate 87      Calcium 9.1      Glucose 120 (*)    TROPONIN T, HIGH SENSITIVITY - Abnormal     Troponin T, High Sensitivity 88 (*)    MAGNESIUM - Abnormal    Magnesium 1.6 (*)    ROUTINE UA WITH MICROSCOPIC REFLEX TO CULTURE - Abnormal    Color Urine Light Yellow      Appearance Urine Clear      Glucose Urine Negative      Bilirubin Urine Negative      Ketones Urine 10 (*)     Specific Gravity Urine 1.015      Blood Urine Negative      pH Urine 8.0 (*)     Protein Albumin Urine Negative      Urobilinogen Urine 2.0      Nitrite Urine Negative      Leukocyte Esterase Urine Negative      RBC Urine 1      WBC Urine 1      Squamous Epithelials Urine <1     CBC WITH PLATELETS AND DIFFERENTIAL - Abnormal    WBC Count 6.7      RBC Count 3.79 (*)     Hemoglobin 11.6 (*)     Hematocrit 33.9 (*)     MCV 89      MCH 30.6      MCHC 34.2      RDW 14.0      Platelet Count 358      % Neutrophils 57      % Lymphocytes 31      % Monocytes 11      % Eosinophils 0      % Basophils 0      % Immature Granulocytes 1      NRBCs per 100 WBC 0      Absolute Neutrophils 3.8      Absolute Lymphocytes 2.1      Absolute Monocytes 0.8      Absolute Eosinophils 0.0      Absolute Basophils 0.0      Absolute Immature Granulocytes 0.0      Absolute NRBCs 0.0     ISTAT GASES LACTATE VENOUS POCT - Abnormal    Lactic Acid POCT 1.9      Bicarbonate Venous POCT 27      O2 Sat, Venous POCT 50 (*)     pCO2 Venous POCT 26 (*)     pH Venous POCT 7.63 (*)     pO2 Venous POCT 21 (*)    TROPONIN T, HIGH SENSITIVITY - Abnormal    Troponin T, High Sensitivity 86 (*)    NT PROBNP INPATIENT - Normal    N terminal Pro BNP Inpatient 821     TSH WITH FREE T4 REFLEX - Normal    TSH 3.20        Emergency Department Course & Assessments:    Interventions:  Medications   lactated ringers BOLUS 1,000 mL (0 mLs Intravenous Stopped 12/4/23 2029)   metoprolol (LOPRESSOR) injection 5 mg (5 mg Intravenous $Given 12/4/23 1906)   magnesium sulfate 2 g in 50 mL sterile water intermittent infusion (2 g Intravenous $New Bag 12/4/23 2016)   iopamidol (ISOVUE-370) solution 94 mL  (94 mLs Intravenous $Given 12/4/23 2023)   sodium chloride 0.9 % bag 500 mL for CT scan flush use (69 mLs Intravenous $Given 12/4/23 2023)   HYDROcodone-acetaminophen (NORCO) 5-325 MG per tablet 2 tablet (2 tablets Oral $Given 12/4/23 2104)        Independent Interpretation (X-rays, CTs, rhythm strip):  None    Assessments/Consultations/Discussion of Management or Tests:  ED Course as of 12/05/23 0123   Mon Dec 04, 2023   1834 I obtained history and examined the patient as noted above.   1858 EKG 12-lead, tracing only  Sinus tachycardia with occasional PVCs.  Rate of 109.  .  QRS 86.  QTc 498.  No acute ST elevation or depression.  No prior ECG easily available for comparison.   1859 I rechecked the patient and explained findings.     1935 I rechecked the patient and explained findings. Patient has normal sinus rhythms, sinus rate in the 70s and 80s, and no SVT runs.   1947 Troponin T, High Sensitivity(!): 88  Likely secondary to demand from intermittent SVT   1949 Bladder scan 273 mL, will straight cath if unable to void.   2050 I rechecked the patient and explained findings.   2108 CT Chest (PE) Abdomen Pelvis w Contrast  Displaced left-sided rib fractures consistent with patient report and with chart review.   2140 Troponin T, High Sensitivity(!): 86  2 set flat   2210 I spoke with Dr. Albarran of the hospitalist team regarding the patient, who accepted the patient for observation admission.   2218 I rechecked the patient and explained findings. We discussed a plan for admission and patient is comfortable with plan. Patient initially was uncertain if he was taking metoprolol for which wife wasn't sure of either, but now recalls that he takes metoprolol and is on 25mg 1 tablet at night after he reports a decrease in dosage from 50mg by his doctor months prior. He is uncertain why he is on metoprolol. When asked if he's aware he has an SVT diagnoses on the last admission and he states he was not aware of that.     2 I updated Dr. Albarran, hospitalist, regarding updated information provided by the patient.       Social Determinants of Health affecting care:   None    Disposition:  The patient was admitted to the hospital under the care of Dr. Albarran.     Impression & Plan    CMS Diagnoses: None    Medical Decision Makin y/o male as described above presents to the ER for worsening palpitations and shortness of breath since yesterday. Patient reports being evaluated yesterday at outside ED and discharged home. Patient is still symptomatic since his discharge. Found to be in SVT intermittently by EMS. Patient otherwise hemodynamically stable at time of my evaluation, but still frequent runs of SVT with rates as high as 160s. At this time, given no hemodynamic instability and patient still able to intermittently flip back in to NSR, will trial push dose metoprolol 5mg IV instead of adenosine for rate and potentially rhythm control as patient per chart review should be on metoprolol. Synchronized cardioversion not emergently indicated at this time. Although patient states he does not know if he takes metoprolol or what the drug would be for. Metoprolol chosen over diltiazem given heart failure history on chart review. If requiring frequent doses of metoprolol, consider amiodarone bolus and drip initiation. In the mean time, will evaluated for potential triggering causes of patient's dyspnea/recurrent SVT including, but not limited to, CHF exacerbation, hyperthyroidism, infection, pulmonary embolism, pneumonia, or ACS. Patient has multiple bruising all over body at different stages of healing, will CT PE with abdomen and pelvis for evaluation of above discussed differentials. Patient also endorses some urinary frequency/urgency, will evaluate for urinary retention/UTI. Discussed care plan with patient and wife who voiced understanding and agreement with plan. Answered all questions. Additional workup/orders as listed in  chart.    Please refer to ED course above as part of continuation of MDM for details on the patient's treatment course and any changes or updates in care plan beyond my initial evaluation and MDM creation.    Diagnosis:    ICD-10-CM    1. Paroxysmal supraventricular tachycardia  I47.10       2. Shortness of breath  R06.02       3. Palpitations  R00.2       4. Generalized weakness  R53.1       5. Closed fracture of multiple ribs of left side, initial encounter  S22.42XA          Scribe Disclosure:  Noel DICKENS, am serving as a scribe at 6:52 PM on 12/4/2023 to document services personally performed by Taz Norman DO, based on my observations and the provider's statements to me.   12/4/2023   Taz Norman DO Yeh, Ferris, DO  12/05/23 0130       Taz Norman DO  12/05/23 0131

## 2023-12-05 NOTE — PLAN OF CARE
Goal Outcome Evaluation:    PRIMARY Concern: Pt here with lightheadedness and palpitation found out to be in SVT. Converted to SR  SAFETY RISK Concerns (fall risk, behaviors, etc.): fall risk       Isolation/Type: none   Tests/Procedures for NEXT shift: PT following. Labs in am. Tele monitoring   Consults? (Pending/following, signed-off?) PT following   Where is patient from? (Home, TCU, etc.): Home. Lives with spouse   Other Important info for NEXT shift: implanted pain pump to lower back. [place]. No hearing aides in place. Rib fracture from recent fall   Anticipated DC date & active delays: tomorrow.     SUMMARY NOTE:  Orientation/Cognitive: A/O X4. [place]   Observation Goals (Met/ Not Met): not met   Mobility Level/Assist Equipment: up with SBA walker/GB   Antibiotics & Plan (IV/po, length of tx left): none  Pain Management: pain pump in place. Prn fentanyl q2h  Tele/VS/O2: VSS. SR   ABNL Lab/BG: Trop  flat in 80s. Mg 2.5  Diet: regular   Bowel/Bladder: continence   Skin Concerns: scattered bruises through out forearm and legs. Scabs to BLE, skin tear to L chest   Drains/Devices: PIV, Tele   Patient Stated Goal for Today: to feel better

## 2023-12-05 NOTE — PHARMACY-ADMISSION MEDICATION HISTORY
Pharmacist Admission Medication History    Admission medication history is complete. The information provided in this note is only as accurate as the sources available at the time of the update.    Information Source(s): Patient and CareEverywhere/SureScripts via in-person    Pertinent Information: 1) pt has an Inspire device implanted for sleep apnea. 2) pt was at pain clinic today where they adjusted his settings to go from 3 to 5 boluses per day but in doing so they lowered his continuous rate. He states he got sick - dizzy, nausea, vomiting etc when he had taken an oxycodone for breakthrough pain in the past (before that he was on the drug regularly for years) so he figures he can't take any narcotics for breakthrough pain while on the pump. 3) Pt was on testosterone gel for several years when a vascular doctor recently told him to stop, 4) did not ask about sildenafil. 5) hasn't been regular about taking his vitamins    Changes made to PTA medication list:  Added: Dicyclomine, fentanyl pump, losartan,   Deleted: zoloft, uroxatrol, testosterone injection, plavix, oxycontin 160mg, prilosec, nexium, duplicate metoprolol, amitiza (takes naloxegol now), dilaudid, lisinopril, norco, gabapentin (didn't help neuropathy), docusate, colchicine, alpha-lipoid acid, xanax, ambien  Changed: Imdur to 60mg from 30mg,     Medication Affordability:  Not including over the counter (OTC) medications, was there a time in the past 3 months when you did not take your medications as prescribed because of cost?: Yes    Allergies reviewed with patient and updates made in EHR: no    Medication History Completed By: Amy Bynum RPH 12/4/2023 10:34 PM    ADDENDUM 12/5  Updated pain pump info w/[facility] information. TEA MERRILL RP on 12/5/2023 at 9:09 AM      Prior to Admission medications    Medication Sig Last Dose Taking? Auth Provider Long Term End Date   acetaminophen (TYLENOL) 500 MG tablet Take 1,000 mg by mouth  every 6 hours as needed prn Yes Reported, Patient     acyclovir (ZOVIRAX) 200 MG capsule TAKE 2 CAPSULES BY MOUTH TWICE DAILY FOR 5 DAYS AS NEEDED FOR COLD SORES prn Yes Reported, Patient Yes    allopurinol (ZYLOPRIM) 300 MG tablet TAKE 1 TABLET(300 MG) BY MOUTH EVERY DAY Strength: 300 mg 12/4/2023 Yes Reported, Patient     aspirin 81 MG EC tablet Take 81 mg by mouth daily 12/4/2023 Yes Unknown, Entered By History     atorvastatin (LIPITOR) 40 MG tablet Take 40 mg by mouth daily 12/4/2023 Yes Reported, Patient     B Complex CAPS Take 1 tablet by mouth daily Past Week Yes Reported, Patient     buPROPion (WELLBUTRIN XL) 150 MG 24 hr tablet Take 150 mg by mouth every morning 12/4/2023 Yes Reported, Patient Yes    chlorhexidine (PERIDEX) 0.12 % solution SWISH AND SPIT 15 ML BY MOUTH TWICE DAILY 12/4/2023 Yes Reported, Patient     dicyclomine (BENTYL) 20 MG tablet Take 20 mg by mouth 3 times daily as needed prn Yes Unknown, Entered By History     DULoxetine (CYMBALTA) 30 MG capsule Take 60 mg by mouth every morning 12/4/2023 Yes Reported, Patient Yes    finasteride (PROSCAR) 5 MG tablet Take 1 tablet by mouth daily at 2 pm 12/4/2023 Yes Reported, Patient     FLOMAX 0.4 MG OR CP24 1 TABLET DAILY AFTER A MEAL 12/4/2023 Yes Jakob Conner MD     fluorometholone (FML LIQUIFILM) 0.1 % ophthalmic suspension Place 1 drop into both eyes as needed (dry eyes) prn Yes Reported, Patient     fluticasone (FLONASE) 50 MCG/ACT nasal spray SHAKE LIQUID WELL AND INHALE 2 SPRAYS INTO AFFECTED NOSTRILS TWICE DAILY AS NEEDED FOR RHINITIS prn Yes Reported, Patient     isosorbide mononitrate (IMDUR) 60 MG 24 hr tablet Take 60 mg by mouth daily 12/4/2023 Yes Unknown, Entered By History     lidocaine (XYLOCAINE) 5 % external ointment APPLY TOPICALLY TO THE AFFECTED AREA THREE TIMES DAILY AS NEEDED  Yes Reported, Patient     losartan (COZAAR) 100 MG tablet Take 100 mg by mouth daily 12/4/2023 Yes Unknown, Entered By History      medication given by implanted intrathecal pump continuous Drug # 1: Bupivacaine (Marcaine)  - Conc:25 mg/mL - Total Dose / 24 hours: 1025 mg    Drug # 2: Hydromorphone (Dilaudid)  - Conc:0.3 mg/mL - Total Dose / 24 hours: 12.3 mg    Drug # 3: Fentanyl (Sublimaze) - Conc: 1667 mcg/mL - Total Dose / 24 hours: 05177 mcg    Diluent: NS    May give up to 5 boluses per day of hydromorphone at 0.1mg, fentanyl at 40 mcg and bupivacaine 0.6 mg in a 24 hour period   Pump Reservoir Volume: 40 mL  Outside Clinic & Provider: [facility]   Last Refill Date: 12/04/2023  Next Refill Date: 1/16/2024  Low [place] Alarm Date: 1/20/2024  Pump : [employer]     Please consider consulting the pain team if the patient is admitted with an IT pump.  Yes Unknown, Entered By History     metolazone (ZAROXOLYN) 5 MG tablet TAKE 1 TABLET BY MOUTH EVERY DAY AS NEEDED FOR WEIGHT GAIN OR LEG SWELLING prn Yes Reported, Patient Yes    metoprolol succinate ER (TOPROL XL) 25 MG 24 hr tablet Take 25 mg by mouth daily 12/4/2023 Yes Unknown, Entered By History     MULTIVITAMINS OR TABS 1 tablet daily Past Week Yes Jakob Conner MD     naloxegol 25 MG TABS tablet Take 25 mg by mouth every morning (before breakfast) 12/4/2023 Yes Unknown, Entered By History     NARCAN 4 MG/0.1ML nasal spray once as needed prn Yes Reported, Patient Yes    pantoprazole (PROTONIX) 40 MG EC tablet Take 40 mg by mouth daily 12/4/2023 Yes Reported, Patient     Potassium Chloride MONICA Take 20 mEq by mouth daily 12/4/2023 Yes Reported, Patient     torsemide (DEMADEX) 20 MG tablet Take 1 tablet by mouth daily 12/4/2023 Yes Reported, Patient Yes    traZODone (DESYREL) 50 MG tablet Take 75 mg by mouth at bedtime 12/3/2023 Yes Reported, Patient     nitroGLYcerin (NITROSTAT) 0.4 MG sublingual tablet DISSOLVE 1 TABLET UNDER THE TONGUE AS NEEDED FOR CHEST PAIN EVERY 5 MINUTES AS NEEDED AS DIRECTED prn  Reported, Patient Yes    ondansetron (ZOFRAN ODT) 4 MG  ODT tab DISSOLVE 1 TABLET(4 MG) ON THE TONGUE EVERY 8 HOURS AS NEEDED FOR NAUSEA OR VOMITING prn  Reported, Patient

## 2023-12-05 NOTE — ED TRIAGE NOTES
Brought by EMS for c/o palpitations, SOB, and generalized weakness. Per EMS pt heart rhythm was in the 170's. He then converted back to SR en route. Blood sugar was 166.      Triage Assessment (Adult)       Row Name 12/04/23 2879          Triage Assessment    Airway WDL WDL        Respiratory WDL    Respiratory WDL X;all     Rhythm/Pattern, Respiratory shortness of breath     Expansion/Accessory Muscles/Retractions abdominal muscle use        Skin Circulation/Temperature WDL    Skin Circulation/Temperature WDL X  bruising        Cardiac WDL    Cardiac WDL X;rhythm     Cardiac Rhythm SVT  at times        Peripheral/Neurovascular WDL    Peripheral Neurovascular WDL WDL        Cognitive/Neuro/Behavioral WDL    Cognitive/Neuro/Behavioral WDL WDL

## 2023-12-05 NOTE — H&P
[facility]    History and Physical - Hospitalist Service       Date of Admission:  12/4/2023    Assessment & Plan      Eren James is a 78 year old male admitted on 12/4/2023. He presented to the emergency department for evaluation of intermittent shortness of breath and lightheadedness in the setting of intermittent SVT.  Recent hospitalization through [facility] system after fall and rib fractures where SVT was noted in the emergency department as well.    Paroxysmal supraventricular tachycardia: Symptomatic with lightheadedness and dyspnea.  Known history of SVT identified on 48-hour Holter monitor August 2, 2022 prior to switch from labetalol to metoprolol through cardiology clinic.  -Metoprolol XL increased from 12.5 mg daily to 25 mg daily.  Note that this dose was previously switched from 25 to 12.5 September 2023 as patient was complaining of fatigue  -Recommend Zio patch monitoring at discharge and follow-up with cardiology team through [facility] system.  May need further dose titration of his beta-blockade versus consideration for outpatient ablation if he is unable to tolerate rate control.  -Potassium and magnesium replacement protocols in place  -Initiating oral magnesium supplementation for hypomagnesemia.  Received 2 g of magnesium IV in the emergency department.  -Prior to admission torsemide on hold given observation admission; this is likely the cause of patient's potassium/magnesium deficiency.  -Not repeating echocardiogram at this time.  Recently had echocardiogram through [facility] system 12/1/2023.  -No pulmonary embolism noted on CT chest abdomen pelvis 11/30/2023 or 12/4/2023.    Chronic pain syndrome: Patient with implanted pain pump.  Follows with [facility] pain clinic and was actually seen earlier today for pump refill.  Primarily pain is in low back, but also with complaints of neuropathy of lower extremities related to his spinal stenosis.  -fentanyl injection 0.25 mcg/kg  q2h prn for severe pain while awaiting reconciliation of pain pump bolus dose. 4 doses ordered (pt w/o his bolus controller)  -Follow-up with pain team  -Continue naloxegol 25 mg daily  -Continue Cymbalta 60 mg every morning, Wellbutrin 150 mg every morning  -Continue trazodone 75 mg at bedtime    Coronary artery disease: History of two-vessel coronary artery bypass grafting with LIMA to LAD and SVG to diagonal.  Hypertension:  Non-ST elevation myocardial infarction: Suspect type II in the setting of demand ischemia with SVT rates in the 170s range.  Similar elevation in his troponin historically including during last admission through [facility].  Troponin trend stable.  No inducible ischemia on nuclear medicine stress test January 2023.  -Increasing metoprolol XL to 25 mg daily as above  -Continue losartan 100 mg daily  -Continue aspirin 81 mg daily  -Can resume prior to admission atorvastatin 40 mg daily at discharge.  Held given observation admission    GERD:  -Continue Protonix 40 mg daily    Recent left 7-9 rib fractures: Following a fall which took place approximately 11/27/2023.  -Continue with pulmonary toilet.  Importance of deep inspiration and ongoing movement discussed with patient on admission.    Obstructive sleep apnea: Utilizes inspire device, but has not been using in the past week or so.  Reports that he has had some daytime fatigue and fell asleep playing cards with friends the other day.  Discussed that daytime somnolence is often a symptom of untreated sleep apnea and encouraged him to use his inspire device.  -Oximetry, oxygen as needed  -Resume use of inspire device when able  -Can offer CPAP overnight if nocturnal desaturations noted.          Diet:  Regular adult diet  DVT Prophylaxis: Ambulate every shift  Alexander Catheter: Not present  Lines: None     Cardiac Monitoring: None  Code Status:  Full code.  Confirmed with patient on admission    Clinically Significant Risk Factors Present on  Admission            # Hypomagnesemia: Lowest Mg = 1.6 mg/dL in last 2 days, will replace as needed     # Drug Induced Platelet Defect: home medication list includes an antiplatelet medication   # Hypertension: Noted on problem list                 Disposition Plan    anticipate discharge home 12/5/2023 with outpatient cardiac monitoring on increased dose of metoprolol       Unruly Albarran MD  Hospitalist Service  [facility]  Securely message with [employer] (more info)  Text page via [employer] Paging/Directory     ______________________________________________________________________    Chief Complaint   Lightheadedness, dyspnea on exertion, intermittent SVT    History is obtained from the patient, chart review, discussion w/ Dr Norman in the emergency department, review of outside records including recent hospitalization for fall and rib fractures w/ CT PE study, TTE completed at that time.    History of Present Illness   Eren James is a 78 year old male who presented to the emergency department for evaluation of intermittent rapid palpitations associated with lightheadedness and dyspnea, specifically on exertion.  Has had these episodes on and off, more recently over the past week.  Recent fall and left rib fractures November 27 or so resulting in hospitalization 11/30 - 12/2/2023 through [facility] system.  Return to [facility] ER 12/3/2023 with complaints of his chronic back pain as well as intermittent palpitations and lightheadedness.  Patient discharged from the emergency department, and was frustrated with this.  Subsequently presented to [facility] emergency department 12/4.  EMS as well as ER noted patient to have intermittent supraventricular tachycardia with rates up to 170 bpm.  Patient endorsed lightheadedness during these episodes.  During at least one of his recent visits through [facility] system he was noted to have tachycardia, but I believe it was described as sinus with  arrhythmia.    Patient actually has multiple complaints here tonight.  He describes pain in his legs with neuropathy as well as his chronic back pain.  Was seen in pain clinic earlier today with adjustment to his pump.  Does not have his bolus device for his pain pump currently.  He tells me that he has had some increased daytime somnolence, fell asleep playing cards with friends the other day and they were concerned about him.  He also tells me that he has not been using his inspire device with his recent fall, so his increased daytime somnolence likely relates to his sleep apnea.  We discussed this, and I encouraged him to use his inspire device.    Patient's chest discomfort associated with recent rib fractures has improved over the past week, though he did note it worsened when he was discharged from the hospital with increased activity.  Encouraged him to continue with deep inspiration and pulmonary toilet to avoid atelectasis and pneumonia.    Discussed findings of supraventricular tachycardia.  August 2023 underwent 48-hour Holter monitoring and subsequently his beta-blocker was switched from I believe labetalol to metoprolol 25 mg daily.  In September, his cardiologist reduced this dose to 12.5 mg daily as patient was complaining of fatigue, which is a more longstanding complaint.  Discussed that his SVT might be related to his symptoms of dyspnea on exertion and lightheadedness intermittently, but is unlikely related to his more chronic complaints including neuropathy, low back pain with chronic pain syndrome, daytime fatigue.  Discussed plan of care including increasing metoprolol dose with potential need for further outpatient up titration, outpatient cardiac monitoring, and cardiology follow-up for potential ablation if rate control attempts are unsuccessful.    In terms of his lightheadedness      Past Medical History    Past Medical History:   Diagnosis Date    Aneurysm of other visceral artery     See  PSH    Backache, unspecified     Chronic back pain; takes MS Contin    Esophageal reflux     Hypertrophy of prostate with urinary obstruction and other lower urinary tract symptoms (LUTS)     Other and unspecified hyperlipidemia     Other testicular hypofunction     Unspecified cardiovascular disease     Previous coronary angioplasties    Unspecified essential hypertension     Urinary calculus, unspecified        Past Surgical History   Past Surgical History:   Procedure Laterality Date    [facility] NONSPECIFIC PROCEDURE      Colon surg for diverticulitis    [facility] NONSPECIFIC PROCEDURE      T&A    [facility] NONSPECIFIC PROCEDURE      Coronary angioplasties    [facility] NONSPECIFIC PROCEDURE      ABd artery aneurysm ligation (not aortic aneurysm)   Required repeat colon surgery with temporary colostomy after (?)anastomosis dehiscence(?) and resultant peritonitis following first partial colectomy.    Prior to Admission Medications   Prior to Admission Medications   Prescriptions Last Dose Informant Patient Reported? Taking?   ALPRAZOLAM 0.5 MG OR TABS   No No   Si po q6 h prn anxiety   AMBIEN 10 MG OR TABS   No No   Si TABLET AT BEDTIME AS NEEDED   Patient not taking: Reported on 10/23/2023   ASPIRIN 325 MG OR TBEC   Yes No   Sig: Take 81 mg by mouth daily   Alpha-Lipoic Acid 200 MG TABS   Yes No   Sig: Take 200-400 mg by mouth   B Complex CAPS   Yes No   Sig: as directed Orally   BINAXNOW COVID-19 AG HOME TEST KIT   Yes No   Sig: follow package directions   DULoxetine (CYMBALTA) 30 MG capsule   Yes No   Sig: Take 60 mg by mouth every morning   FLOMAX 0.4 MG OR CP24   No No   Si TABLET DAILY AFTER A MEAL   HYDROcodone-acetaminophen (NORCO) 5-325 MG tablet   Yes No   Sig: Take 1 tablet by mouth 3 times daily as needed for pain   HYDROmorphone (DILAUDID) 4 MG tablet   Yes No   IMDUR 30 MG OR TB24   Yes No   Si TABLET EVERY MORNING   LISINOPRIL 40 MG OR TABS   No No   Si tab PO QD (Once per day)   Patient not  taking: Reported on 10/23/2023   METOPROLOL SUCCINATE TBCR# 50 MG OR   No No   Sig: take one tablet by mouth daily   Patient taking differently: Take 25 mg by mouth   MULTIVITAMINS OR TABS   Yes No   Si tablet daily   NARCAN 4 MG/0.1ML nasal spray   Yes No   Sig: once as needed   NEXIUM 20 MG OR CPDR   Yes No   Si CAPSULE BID   Patient not taking: Reported on 10/23/2023   OMEPRAZOLE 40 MG OR CPDR   No No   Si CAPSULE DAILY   OXYCONTIN 160 MG OR TB12   No No   PLAVIX 75 MG OR TABS   No No   Si TABLET DAILY   Patient not taking: Reported on 10/23/2023   TESTOSTERONE CYPIONATE 200 MG/ML IM OIL   Yes No   Si MG EVERY 2 WEEKS   Patient not taking: Reported on 10/23/2023   UROXATRAL 10 MG OR TB24   Yes No   Si TABLET DAILY AFTER A MEAL   Patient not taking: Reported on 10/23/2023   ZOLOFT 100 MG OR TABS   No No   Si 1/2 tabs daily (150 mg total)  MD appointment needed   Patient not taking: Reported on 10/23/2023   acetaminophen (TYLENOL) 500 MG tablet   Yes No   Sig: Take 1,000 mg by mouth   acyclovir (ZOVIRAX) 200 MG capsule   Yes No   Sig: TAKE 2 CAPSULES BY MOUTH TWICE DAILY FOR 5 DAYS AS NEEDED FOR COLD SORES   albuterol (VENTOLIN HFA) 108 (90 Base) MCG/ACT inhaler   Yes No   Sig: Inhale 2 puffs into the lungs every 6 hours as needed   allopurinol (ZYLOPRIM) 300 MG tablet   Yes No   Sig: TAKE 1 TABLET(300 MG) BY MOUTH EVERY DAY Strength: 300 mg   atorvastatin (LIPITOR) 40 MG tablet   Yes No   Sig: Take 40 mg by mouth daily   buPROPion (WELLBUTRIN XL) 150 MG 24 hr tablet   Yes No   Sig: Take 150 mg by mouth every morning   chlorhexidine (PERIDEX) 0.12 % solution   Yes No   Sig: SWISH AND SPIT 15 ML BY MOUTH TWICE DAILY   colchicine (COLCRYS) 0.6 MG tablet   Yes No   Sig: Take 0.6 mg by mouth   docusate sodium (DSS) 100 MG capsule   Yes No   Si capsule as needed Orally Once a day for 30 day(s)   finasteride (PROSCAR) 5 MG tablet   Yes No   Sig: Take 1 tablet by mouth daily at 2 pm    fluorometholone (FML LIQUIFILM) 0.1 % ophthalmic suspension   Yes No   fluticasone (FLONASE) 50 MCG/ACT nasal spray   Yes No   Sig: SHAKE LIQUID WELL AND INHALE 2 SPRAYS INTO AFFECTED NOSTRILS TWICE DAILY AS NEEDED FOR RHINITIS   gabapentin (NEURONTIN) 300 MG capsule   Yes No   Sig: Take 1 capsule in the morning and take 2 capsules at bedtime   isosorbide mononitrate (IMDUR) 60 MG 24 hr tablet   Yes No   Sig: Take 60 mg by mouth daily   lidocaine (XYLOCAINE) 5 % external ointment   Yes No   Sig: APPLY TOPICALLY TO THE AFFECTED AREA THREE TIMES DAILY AS NEEDED   lubiprostone (AMITIZA) 8 MCG capsule   Yes No   Si mcg 2 times daily   metolazone (ZAROXOLYN) 5 MG tablet   Yes No   Sig: TAKE 1 TABLET BY MOUTH EVERY DAY AS NEEDED FOR WEIGHT GAIN OR LEG SWELLING   metoprolol succinate ER (TOPROL XL) 25 MG 24 hr tablet   Yes No   Patient not taking: Reported on 10/23/2023   nitroGLYcerin (NITROSTAT) 0.4 MG sublingual tablet   Yes No   Sig: DISSOLVE 1 TABLET UNDER THE TONGUE AS NEEDED FOR CHEST PAIN EVERY 5 MINUTES AS NEEDED AS DIRECTED   ondansetron (ZOFRAN ODT) 4 MG ODT tab   Yes No   Sig: DISSOLVE 1 TABLET(4 MG) ON THE TONGUE EVERY 8 HOURS AS NEEDED FOR NAUSEA OR VOMITING   pantoprazole (PROTONIX) 40 MG EC tablet   Yes No   Sig: Take 40 mg by mouth daily   potassium chloride (KLOR-CON) 20 MEQ packet   Yes No   Si packet with food Orally Once a day for 30 day(s)   torsemide (DEMADEX) 20 MG tablet   Yes No   Sig: Take 1 tablet by mouth daily   traZODone (DESYREL) 50 MG tablet   Yes No   Sig: Take 75 mg by mouth      Facility-Administered Medications: None           Physical Exam   Vital Signs: Temp: 98.6  F (37  C) Temp src: Oral BP: (!) 152/88 Pulse: 71   Resp: 14 SpO2: 100 % O2 Device: None (Room air)      General Appearance: Generally well-appearing 78-year-old male resting comfortably in bed.  He is in no acute distress.  Eyes: No scleral icterus or injection  HEENT: Normocephalic and atraumatic  Respiratory:  Breath sounds are clear bilaterally to auscultation without wheezes or crackles.  Cardiovascular: Regular rate and rhythm with heart rate currently in the 80 range.  No appreciable murmur.  GI: Abdomen soft, nontender palpation.  No palpable mass.  Large midline abdominal incision with diastases recti.  Scars from prior colostomy takedown.  Lymph/Hematologic: 1-2+ pitting edema bilateral lower extremities.  Genitourinary: Not examined  Skin: Ecchymoses across anterior chest at lower border of rib cage.  Some venous stasis hyperpigmentation of bilateral lower extremities.  Musculoskeletal: Muscular tone and bulk generally intact in all extremities and appropriate for age.  Neurologic: Alert, conversant, appropriate in conversation.  Mental status grossly intact.  Psychiatric: Pleasant, normal affect    Medical Decision Making       80 MINUTES SPENT BY ME on the date of service doing chart review, history, exam, documentation & further activities per the note.      Data     I have personally reviewed the following data over the past 24 hrs:    6.7  \   11.6 (L)   / 358     139 101 15.7 /  120 (H)   3.4 23 0.89 \     Trop: 86 (H) BNP: 821     TSH: 3.20 T4: N/A A1C: N/A     Procal: N/A CRP: N/A Lactic Acid: 1.9         Imaging results reviewed over the past 24 hrs:   Recent Results (from the past 24 hour(s))   XR Chest B Read 2 views    Narrative    For Patients:  As a result of the 21st Century Cures Act, medical imaging exams and procedure reports are released immediately into your electronic medical record.  You may view this report before your referring provider.  If you have questions, please contact your health care provider.      INDICATION:  Chest pain     TECHNIQUE:  Chest radiograph 2 views     COMPARISON:  11/30/2023     FINDINGS:  Postsurgical changes from median sternotomy and CABG. Heart size mildly enlarged. Lung markings are stable with stable scarring and volume loss in the right upper lung zone,  extending to the right suprahilar region. No pneumothorax or blunting of costophrenic sulci cleared no thoracic spine compression abnormality.     Impression    1. Stable appearance of the chest from 11/30/2023. Cardiomegaly status post prior CABG with presumed scarring in the right upper lung zone.   2. Multiple lower left rib fracture deformities better characterized on interval chest CT from 11/30/2023. No visualized pneumothorax.        Dictated by Caleb Mitchell MD @ 12/3/2023 11:01:46 PM    (Electronically Signed)   CT Chest (PE) Abdomen Pelvis w Contrast    Narrative    EXAM: CT CHEST PE ABDOMEN PELVIS W CONTRAST  LOCATION: [facility]  DATE: 12/4/2023    INDICATION: shortness of breath, intermittent SVT, history of left rib fractures, urinary frequency, frequent falls  COMPARISON: None.  TECHNIQUE: CT chest pulmonary angiogram and routine CT abdomen pelvis with IV contrast. Arterial phase through the chest and venous phase through the abdomen and pelvis. Multiplanar reformats and MIP reconstructions were performed. Dose reduction   techniques were used.   CONTRAST: 94mL Isovue 370    FINDINGS:  ANGIOGRAM CHEST: Pulmonary arteries are normal caliber and negative for pulmonary emboli. Thoracic aorta is not well opacified and is  indeterminate for dissection. No CT evidence of right heart strain.     LUNGS AND PLEURA: Volume loss and bronchial wall thickening in the right upper lobe.    MEDIASTINUM/AXILLAE: No lymphadenopathy. Calcified mediastinal and hilar lymph nodes consistent with prior granulomatous infection. No thoracic aortic aneurysms.    CORONARY ARTERY CALCIFICATION: Previous intervention (stents or CABG).    HEPATOBILIARY: Normal.    PANCREAS: Normal.    SPLEEN: Punctate calcified granulomas.    ADRENAL GLANDS: Normal.    KIDNEYS/BLADDER: No significant mass, stones, or hydronephrosis. There are simple or benign cysts. No follow up is needed.    BOWEL: Diverticulosis of the  colon. No acute inflammatory change. No obstruction. Embolization coils anterior to the duodenum.    LYMPH NODES: Normal.    VASCULATURE: No abdominal aortic aneurysm.    PELVIC ORGANS: Normal.    MUSCULOSKELETAL: Stimulator pack right anterior chest wall and right lower back. Minimally displaced fractures of the left eighth and ninth ribs laterally. Median sternotomy wires. Dextroconvex curvature of the lumbar spine.      Impression    IMPRESSION:  1.  No acute findings or other explanation for symptoms.    2.  Minimally displaced fractures of the left eighth and ninth ribs laterally.    3.  Volume loss and scarring in the right upper lobe, likely from prior infection or inflammation.

## 2023-12-05 NOTE — PROGRESS NOTES
[facility]  Hospitalist Progress Note    Assessment & Plan   Eren James is a 78 year old male admitted on 12/4/2023. He presented to the emergency department for evaluation of intermittent shortness of breath and lightheadedness in the setting of intermittent SVT.  Recent hospitalization through [facility] system after fall and rib fractures where SVT was noted in the emergency department as well.     Paroxysmal supraventricular tachycardia  *Symptomatic with lightheadedness and dyspnea. Known history of SVT identified on 48-hour Holter monitor August 2, 2022 prior to switch from labetalol to metoprolol through cardiology clinic.  *Metoprolol XL increased from 12.5 mg daily to 25 mg daily. Note that this dose was previously switched from 25 to 12.5 September 2023 as patient was complaining of fatigue  -Monitor on telemetry, low threshold for cardiology consult if recurrent SVT  -Recommend Zio patch monitoring at discharge and follow-up with cardiology team through [facility] system.  May need further dose titration of his beta-blockade versus consideration for outpatient ablation if he is unable to tolerate rate control.  -Replace magnesium as below  -Not repeating echocardiogram at this time. Recently had echocardiogram through [facility] system 12/1/2023.  -No pulmonary embolism noted on CT chest abdomen pelvis 11/30/2023 or 12/4/2023    Hypomagnesemia  *Mag 1.6 on admission, now up to 2.5  -RN replacement protocol  -Prior to admission torsemide on hold given observation admission; this is likely the cause of patient's potassium/magnesium deficiency.     Chronic pain syndrome  *Patient with implanted pain pump. Follows with [facility] pain clinic and was actually seen earlier 12/4 for pump refill. Primarily pain is in low back, but also with complaints of neuropathy of lower extremities related to his spinal stenosis.  -Continue Fentanyl injection 0.25 mcg/kg q2h prn for severe pain (pt w/o his bolus  controller) he reports this is currently working well for him. His pump utilizes boluses of Hydromorphone 0.1 mg, Fentanyl 40 mcg, and Bupivacaine 0.6 mg up to 5 times per day.  -Follow-up with pain team as outpatient  -Continue naloxegol 25 mg daily  -Continue Cymbalta 60 mg every morning, Wellbutrin 150 mg every morning  -Continue trazodone 75 mg at bedtime     Coronary artery disease: History of two-vessel coronary artery bypass grafting with LIMA to LAD and SVG to diagonal.  Hypertension:  Non-ST elevation myocardial infarction: Suspect type II in the setting of demand ischemia with SVT rates in the 170s range. Similar elevation in his troponin historically including during last admission through [facility]. Troponin trend stable. No inducible ischemia on nuclear medicine stress test January 2023.  -Increasing metoprolol XL to 25 mg daily as above  -Continue losartan 100 mg daily  -Continue aspirin 81 mg daily  -Can resume prior to admission atorvastatin 40 mg daily at discharge. Held given observation admission     GERD:  -Continue Protonix 40 mg daily     Recent left 7-9 rib fractures: Following a fall which took place approximately 11/27/2023.  -Continue with pulmonary toilet. Importance of deep inspiration and ongoing movement discussed with patient on admission.     Obstructive sleep apnea: Utilizes inspire device, but has not been using in the past week or so.  Reports that he has had some daytime fatigue and fell asleep playing cards with friends the other day.  Discussed that daytime somnolence is often a symptom of untreated sleep apnea and encouraged him to use his inspire device.  -Oximetry, oxygen as needed  -Resume use of inspire device when able  -Can offer CPAP overnight if nocturnal desaturations noted.    Clinically Significant Risk Factors Present on Admission            # Hypomagnesemia: Lowest Mg = 1.6 mg/dL in last 2 days, will replace as needed     # Drug Induced Platelet Defect: home  medication list includes an antiplatelet medication   # Hypertension: Noted on problem list                   Diet: Regular Diet Adult     DVT Prophylaxis: Pneumatic Compression Devices   Alexander Catheter: Not present  Lines: None     Cardiac Monitoring: None  Code Status: Full Code      Disposition Plan       Expected Discharge Date: 12/05/2023              Entered: Mary Ta PA-C 12/05/2023, 9:24 AM        The patient's care was discussed with the Attending Physician, Dr. Martinez and Patient.      The patient has been discussed with Dr. Martinez, who agrees with the assessment and plan at this time.    Mary Ta PA-C  [facility]  Securely message with the Vocera Web Console (learn more here)        Interval History   No episodes of SVT overnight. No chest pain, dyspnea, palpitations. He reports feeling better today than he has been recently. Has seen PT in the past and is supposed to ambulate with a walker.    -Data reviewed today: I reviewed all new labs and imaging results over the last 24 hours. I personally reviewed no images or EKG's today.    Physical Exam   Temp: 98.6  F (37  C) Temp src: Oral BP: (!) 140/68 Pulse: 65   Resp: 10 SpO2: 92 % O2 Device: None (Room air)    There were no vitals filed for this visit.  Vital Signs with Ranges  Temp:  [98.6  F (37  C)] 98.6  F (37  C)  Pulse:  [] 65  Resp:  [10-25] 10  BP: (100-179)/() 140/68  SpO2:  [91 %-100 %] 92 %  No intake/output data recorded.      Constitutional: Awake, alert, cooperative, no apparent distress.    ENT: Normocephalic, without obvious abnormality, atraumatic. Eye pupils are equal. Normal sclera.    Neck: Supple, symmetrical, trachea midline  Pulmonary: No increased work of breathing, good air exchange, clear to auscultation bilaterally  Cardiovascular: Regular rate and rhythm  GI: Normal bowel sounds, soft, non-distended, non-tender.   Skin: Visualized skin appeared clear.  Neuro:  Oriented x 3. Moves all extremities spontaneously. No focal neuro deficits.  Psych:  Normal affect and mood  Extremities: Normal muscle tone. No gross deformities noted. Calves non-tender b/l. No pitting edema b/l LE      Medical Decision Making       40 MINUTES SPENT BY ME on the date of service doing chart review, history, exam, documentation & further activities per the note.         Medications      aspirin  81 mg Oral Daily    buPROPion  150 mg Oral QAM    DULoxetine  60 mg Oral QAM    finasteride  5 mg Oral Daily    isosorbide mononitrate  60 mg Oral Daily    losartan  100 mg Oral Daily    magnesium oxide  400 mg Oral Daily    metoprolol succinate ER  25 mg Oral Daily    naloxegol  25 mg Oral QAM AC    pantoprazole  40 mg Oral Daily    potassium chloride  20 mEq Oral Daily    sodium chloride (PF)  3 mL Intracatheter Q8H    tamsulosin  0.4 mg Oral Daily    traZODone  75 mg Oral At Bedtime       Data   Recent Labs   Lab 12/05/23  0705 12/04/23  1901   WBC  --  6.7   HGB  --  11.6*   MCV  --  89   PLT  --  358    139   POTASSIUM 4.3 3.4   CHLORIDE 104 101   CO2 24 23   BUN 16.7 15.7   CR 0.75 0.89   ANIONGAP 11 15   SHAUN 8.7* 9.1   * 120*       Recent Results (from the past 24 hour(s))   CT Chest (PE) Abdomen Pelvis w Contrast    Narrative    EXAM: CT CHEST PE ABDOMEN PELVIS W CONTRAST  LOCATION: [facility]  DATE: 12/4/2023    INDICATION: shortness of breath, intermittent SVT, history of left rib fractures, urinary frequency, frequent falls  COMPARISON: None.  TECHNIQUE: CT chest pulmonary angiogram and routine CT abdomen pelvis with IV contrast. Arterial phase through the chest and venous phase through the abdomen and pelvis. Multiplanar reformats and MIP reconstructions were performed. Dose reduction   techniques were used.   CONTRAST: 94mL Isovue 370    FINDINGS:  ANGIOGRAM CHEST: Pulmonary arteries are normal caliber and negative for pulmonary emboli. Thoracic aorta is  not well opacified and is  indeterminate for dissection. No CT evidence of right heart strain.     LUNGS AND PLEURA: Volume loss and bronchial wall thickening in the right upper lobe.    MEDIASTINUM/AXILLAE: No lymphadenopathy. Calcified mediastinal and hilar lymph nodes consistent with prior granulomatous infection. No thoracic aortic aneurysms.    CORONARY ARTERY CALCIFICATION: Previous intervention (stents or CABG).    HEPATOBILIARY: Normal.    PANCREAS: Normal.    SPLEEN: Punctate calcified granulomas.    ADRENAL GLANDS: Normal.    KIDNEYS/BLADDER: No significant mass, stones, or hydronephrosis. There are simple or benign cysts. No follow up is needed.    BOWEL: Diverticulosis of the colon. No acute inflammatory change. No obstruction. Embolization coils anterior to the duodenum.    LYMPH NODES: Normal.    VASCULATURE: No abdominal aortic aneurysm.    PELVIC ORGANS: Normal.    MUSCULOSKELETAL: Stimulator pack right anterior chest wall and right lower back. Minimally displaced fractures of the left eighth and ninth ribs laterally. Median sternotomy wires. Dextroconvex curvature of the lumbar spine.      Impression    IMPRESSION:  1.  No acute findings or other explanation for symptoms.    2.  Minimally displaced fractures of the left eighth and ninth ribs laterally.    3.  Volume loss and scarring in the right upper lobe, likely from prior infection or inflammation.        -c/w PPI

## 2023-12-05 NOTE — ED NOTES
Bed: ED05  Expected date:   Expected time:   Means of arrival:   Comments:  Justo 541 78 M sinus arrythmia SVT p 106 SOB while in svt eta 1824

## 2023-12-05 NOTE — PROGRESS NOTES
Observation Goals     -diagnostic tests and consults completed and resulted: not met    -vital signs normal or at patient baseline: met    -returns to baseline functional status: partially met. Lives with wife.     -safe disposition plan has been identified: met    Nurse to notify provider when observation goals have been met and patient is ready for discharge.

## 2023-12-06 ENCOUNTER — APPOINTMENT (OUTPATIENT)
Dept: PHYSICAL THERAPY | Facility: CLINIC | Age: 78
End: 2023-12-06
Payer: MEDICARE

## 2023-12-06 LAB
ANION GAP SERPL CALCULATED.3IONS-SCNC: 10 MMOL/L (ref 7–15)
BUN SERPL-MCNC: 13.1 MG/DL (ref 8–23)
CALCIUM SERPL-MCNC: 8.5 MG/DL (ref 8.8–10.2)
CHLORIDE SERPL-SCNC: 102 MMOL/L (ref 98–107)
CREAT SERPL-MCNC: 0.62 MG/DL (ref 0.67–1.17)
DEPRECATED HCO3 PLAS-SCNC: 24 MMOL/L (ref 22–29)
EGFRCR SERPLBLD CKD-EPI 2021: >90 ML/MIN/1.73M2
GLUCOSE SERPL-MCNC: 162 MG/DL (ref 70–99)
MAGNESIUM SERPL-MCNC: 2.1 MG/DL (ref 1.7–2.3)
POTASSIUM SERPL-SCNC: 4 MMOL/L (ref 3.4–5.3)
SODIUM SERPL-SCNC: 136 MMOL/L (ref 135–145)

## 2023-12-06 PROCEDURE — 96361 HYDRATE IV INFUSION ADD-ON: CPT

## 2023-12-06 PROCEDURE — 250N000011 HC RX IP 250 OP 636: Performed by: INTERNAL MEDICINE

## 2023-12-06 PROCEDURE — 99232 SBSQ HOSP IP/OBS MODERATE 35: CPT | Performed by: INTERNAL MEDICINE

## 2023-12-06 PROCEDURE — G0378 HOSPITAL OBSERVATION PER HR: HCPCS

## 2023-12-06 PROCEDURE — 250N000013 HC RX MED GY IP 250 OP 250 PS 637: Performed by: INTERNAL MEDICINE

## 2023-12-06 PROCEDURE — 250N000013 HC RX MED GY IP 250 OP 250 PS 637: Performed by: STUDENT IN AN ORGANIZED HEALTH CARE EDUCATION/TRAINING PROGRAM

## 2023-12-06 PROCEDURE — 36415 COLL VENOUS BLD VENIPUNCTURE: CPT | Performed by: PHYSICIAN ASSISTANT

## 2023-12-06 PROCEDURE — 80048 BASIC METABOLIC PNL TOTAL CA: CPT | Performed by: PHYSICIAN ASSISTANT

## 2023-12-06 PROCEDURE — 250N000011 HC RX IP 250 OP 636: Performed by: PHYSICIAN ASSISTANT

## 2023-12-06 PROCEDURE — 83735 ASSAY OF MAGNESIUM: CPT | Performed by: PHYSICIAN ASSISTANT

## 2023-12-06 PROCEDURE — 97116 GAIT TRAINING THERAPY: CPT | Mod: GP

## 2023-12-06 PROCEDURE — 96376 TX/PRO/DX INJ SAME DRUG ADON: CPT

## 2023-12-06 PROCEDURE — 97530 THERAPEUTIC ACTIVITIES: CPT | Mod: GP

## 2023-12-06 PROCEDURE — 99222 1ST HOSP IP/OBS MODERATE 55: CPT | Performed by: INTERNAL MEDICINE

## 2023-12-06 RX ORDER — SODIUM CHLORIDE AND POTASSIUM CHLORIDE 150; 900 MG/100ML; MG/100ML
INJECTION, SOLUTION INTRAVENOUS CONTINUOUS
Status: ACTIVE | OUTPATIENT
Start: 2023-12-06 | End: 2023-12-06

## 2023-12-06 RX ORDER — LOSARTAN POTASSIUM 50 MG/1
50 TABLET ORAL DAILY
Status: DISCONTINUED | OUTPATIENT
Start: 2023-12-07 | End: 2023-12-07 | Stop reason: HOSPADM

## 2023-12-06 RX ADMIN — FENTANYL CITRATE 21 MCG: 50 INJECTION, SOLUTION INTRAMUSCULAR; INTRAVENOUS at 10:39

## 2023-12-06 RX ADMIN — TRAZODONE HYDROCHLORIDE 75 MG: 50 TABLET ORAL at 01:34

## 2023-12-06 RX ADMIN — FENTANYL CITRATE 21 MCG: 50 INJECTION, SOLUTION INTRAMUSCULAR; INTRAVENOUS at 14:13

## 2023-12-06 RX ADMIN — ISOSORBIDE MONONITRATE 60 MG: 60 TABLET, EXTENDED RELEASE ORAL at 07:48

## 2023-12-06 RX ADMIN — FENTANYL CITRATE 21 MCG: 50 INJECTION, SOLUTION INTRAMUSCULAR; INTRAVENOUS at 18:07

## 2023-12-06 RX ADMIN — FENTANYL CITRATE 21 MCG: 50 INJECTION, SOLUTION INTRAMUSCULAR; INTRAVENOUS at 07:56

## 2023-12-06 RX ADMIN — LOSARTAN POTASSIUM 100 MG: 100 TABLET, FILM COATED ORAL at 07:47

## 2023-12-06 RX ADMIN — FENTANYL CITRATE 21 MCG: 50 INJECTION, SOLUTION INTRAMUSCULAR; INTRAVENOUS at 20:37

## 2023-12-06 RX ADMIN — TAMSULOSIN HYDROCHLORIDE 0.4 MG: 0.4 CAPSULE ORAL at 07:47

## 2023-12-06 RX ADMIN — FINASTERIDE 5 MG: 5 TABLET, FILM COATED ORAL at 13:43

## 2023-12-06 RX ADMIN — BUPROPION HYDROCHLORIDE 150 MG: 150 TABLET, FILM COATED, EXTENDED RELEASE ORAL at 07:47

## 2023-12-06 RX ADMIN — METOPROLOL SUCCINATE 25 MG: 25 TABLET, EXTENDED RELEASE ORAL at 07:48

## 2023-12-06 RX ADMIN — FENTANYL CITRATE 21 MCG: 50 INJECTION, SOLUTION INTRAMUSCULAR; INTRAVENOUS at 04:53

## 2023-12-06 RX ADMIN — NALOXEGOL OXALATE 25 MG: 25 TABLET, FILM COATED ORAL at 07:48

## 2023-12-06 RX ADMIN — PANTOPRAZOLE SODIUM 40 MG: 40 TABLET, DELAYED RELEASE ORAL at 07:48

## 2023-12-06 RX ADMIN — DULOXETINE HYDROCHLORIDE 60 MG: 60 CAPSULE, DELAYED RELEASE ORAL at 07:47

## 2023-12-06 RX ADMIN — FENTANYL CITRATE 21 MCG: 50 INJECTION, SOLUTION INTRAMUSCULAR; INTRAVENOUS at 22:40

## 2023-12-06 RX ADMIN — Medication 400 MG: at 07:47

## 2023-12-06 RX ADMIN — POTASSIUM CHLORIDE AND SODIUM CHLORIDE: 900; 150 INJECTION, SOLUTION INTRAVENOUS at 11:09

## 2023-12-06 RX ADMIN — ASPIRIN 81 MG: 81 TABLET, COATED ORAL at 07:47

## 2023-12-06 RX ADMIN — POTASSIUM CHLORIDE 20 MEQ: 1500 TABLET, EXTENDED RELEASE ORAL at 07:48

## 2023-12-06 ASSESSMENT — ACTIVITIES OF DAILY LIVING (ADL)
ADLS_ACUITY_SCORE: 35
ADLS_ACUITY_SCORE: 36
ADLS_ACUITY_SCORE: 35
ADLS_ACUITY_SCORE: 35
ADLS_ACUITY_SCORE: 36
ADLS_ACUITY_SCORE: 36
ADLS_ACUITY_SCORE: 35
ADLS_ACUITY_SCORE: 35
ADLS_ACUITY_SCORE: 36
ADLS_ACUITY_SCORE: 36
ADLS_ACUITY_SCORE: 35
ADLS_ACUITY_SCORE: 36

## 2023-12-06 NOTE — PROGRESS NOTES
[facility]    Hospitalist Progress Note    Brief Summary:  Eren James is a 78 year old male admitted on 12/4/2023. He presented to the emergency department for evaluation of intermittent shortness of breath and lightheadedness in the setting of intermittent SVT.  Recent hospitalization through [facility] after fall and rib fractures where SVT was noted in the emergency department as well.     Assessment & Plan        Paroxysmal supraventricular tachycardia: Symptomatic and recurrent  *Symptomatic with lightheadedness and dyspnea. Known history of SVT identified on 48-hour Holter monitor August 2, 2022 prior to switch from labetalol to metoprolol through cardiology clinic.  *Metoprolol XL increased from 12.5 mg daily to 25 mg daily.   Patient had 2 episodes of SVT in the last 1 week.  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  No significant valvular abnormality noted.  -No pulmonary embolism noted on CT chest abdomen pelvis 11/30/2023      patient history of SVT with recurrence, will consult EP to evaluate the patient, the patient will benefit from ablation if agrees.  Otherwise we will plan for cardiac monitoring as outpatient and then follow-up with his cardiologist outpatient.    Hypotension with lightheadedness  Patient had episodes of low blood pressure this morning  He was symptomatic, and feel fatigued, blood pressure was 91 x 56.  At this time we will give gentle IV fluids at 100 mill per hour for 4 to 5 hours.  Cut down his losartan from 100 to 50 mg daily, he only took all his medication today  For monitor his blood pressure today and see how he does.     Hypomagnesemia: Replace  *Mag 1.6 on admission, now up to 2.5  -RN replacement protocol  -Prior to admission torsemide on hold given observation admission; this is likely the cause of patient's  potassium/magnesium deficiency.     Chronic pain syndrome: Controlled  *Patient with implanted pain pump. Follows with [facility] pain clinic and was actually seen earlier 12/4 for pump refill. Primarily pain is in low back, but also with complaints of neuropathy of lower extremities related to his spinal stenosis.  -Continue Fentanyl injection 0.25 mcg/kg q2h prn for severe pain (pt w/o his bolus controller) he reports this is currently working well for him. His pump utilizes boluses of Hydromorphone 0.1 mg, Fentanyl 40 mcg, and Bupivacaine 0.6 mg up to 5 times per day.  -Follow-up with pain team as outpatient  -Continue naloxegol 25 mg daily  -Continue Cymbalta 60 mg every morning, Wellbutrin 150 mg every morning  -Continue trazodone 75 mg at bedtime     Coronary artery disease: History of two-vessel coronary artery bypass grafting with LIMA to LAD and SVG to diagonal.  Hypertension:  Non-ST elevation myocardial infarction: Suspect type II in the setting of demand ischemia with SVT rates in the 170s range. Similar elevation in his troponin historically including during last admission through [facility] system. Troponin trend stable. No inducible ischemia on nuclear medicine stress test January 2023.  -I continue metoprolol XL 25 mg daily, as he was taking at home.  -Cut down losartan to 50 mg daily given low blood pressure this morning  -Continue aspirin 81 mg daily  -Can resume prior to admission atorvastatin 40 mg daily at discharge. Held given observation admission     GERD:  -Continue Protonix 40 mg daily     Recent left 7-9 rib fractures: Following a fall which took place approximately 11/27/2023.  -Continue with pulmonary toilet. Importance of deep inspiration and ongoing movement discussed with patient on admission.     Obstructive sleep apnea: Utilizes inspire device, but has not been using in the past week or so.  Reports that he has had some daytime fatigue and fell asleep playing cards with friends the other day.   Discussed that daytime somnolence is often a symptom of untreated sleep apnea and encouraged him to use his inspire device.  -Oximetry, oxygen as needed  -Resume use of inspire device when able       Clinically Significant Risk Factors Present on Admission             # Hypomagnesemia: Lowest Mg = 1.6 mg/dL in last 2 days, will replace as needed     # Drug Induced Platelet Defect: home medication list includes an antiplatelet medication   # Hypertension: Noted on problem list                        Diet: Regular Diet Adult          DVT Prophylaxis: Pneumatic Compression Devices  Code Status: Full Code    Disposition: Expected discharge in 1 to 2 days if remains stable    Adan Rincon MD, MD  Text Page  (7am - 6pm)    Interval History   Patient seen and evaluated this morning, he told me when he woke up this morning he felt fatigue and tired, he get dizzy and lightheaded when he get up, his blood pressure was down to 91/56 this morning.  He already took his all his antihypertensive medications    No other significant event overnight, no further episode of SVT while in the hospital    -Data reviewed today: I reviewed all new labs and imaging results over the last 24 hours. I personally reviewed no images or EKG's today.    Physical Exam   Temp: 98.6  F (37  C) Temp src: Oral BP: 117/64 Pulse: 67   Resp: 20 SpO2: 97 % O2 Device: None (Room air)    There were no vitals filed for this visit.  Vital Signs with Ranges  Temp:  [97.7  F (36.5  C)-98.6  F (37  C)] 98.6  F (37  C)  Pulse:  [60-78] 67  Resp:  [10-20] 20  BP: ()/(56-77) 117/64  SpO2:  [93 %-98 %] 97 %  No intake/output data recorded.    Constitutional: awake, alert, cooperative, no apparent distress, and appears stated age  Eyes: Lids and lashes normal, pupils equal, round and reactive to light, extra ocular muscles intact, sclera clear, conjunctiva normal  Respiratory: No increased work of breathing, good air exchange, clear to auscultation bilaterally,  no crackles or wheezing  Cardiovascular: Normal apical impulse, regular rate and rhythm, normal S1 and S2, no S3 or S4, and no murmur noted  GI: No scars, normal bowel sounds, soft, non-distended, non-tender, no masses palpated, no hepatosplenomegally  Musculoskeletal: no lower extremity pitting edema present  CNS: No focal deficit    Medications    0.9% sodium chloride + KCl 20 mEq/L 100 mL/hr at 12/06/23 1109    medication given by implanted intrathecal pump        aspirin  81 mg Oral Daily    buPROPion  150 mg Oral QAM    DULoxetine  60 mg Oral QAM    finasteride  5 mg Oral Daily    isosorbide mononitrate  60 mg Oral Daily    [START ON 12/7/2023] losartan  50 mg Oral Daily    magnesium oxide  400 mg Oral Daily    metoprolol succinate ER  25 mg Oral Daily    naloxegol  25 mg Oral QAM AC    pantoprazole  40 mg Oral Daily    potassium chloride  20 mEq Oral Daily    sodium chloride (PF)  3 mL Intracatheter Q8H    tamsulosin  0.4 mg Oral Daily    traZODone  75 mg Oral At Bedtime       Data   Recent Labs   Lab 12/06/23  0642 12/05/23  0705 12/04/23  1901   WBC  --   --  6.7   HGB  --   --  11.6*   MCV  --   --  89   PLT  --   --  358    139 139   POTASSIUM 4.0 4.3 3.4   CHLORIDE 102 104 101   CO2 24 24 23   BUN 13.1 16.7 15.7   CR 0.62* 0.75 0.89   ANIONGAP 10 11 15   SHAUN 8.5* 8.7* 9.1   * 104* 120*       No results found for this or any previous visit (from the past 24 hour(s)).

## 2023-12-06 NOTE — PLAN OF CARE
PRIMARY Concern: Palpitations, SOB and generalized weakness, at admission in ED pt was in SVT- converted to SR  SAFETY RISK Concerns (fall risk, behaviors, etc.): Fall risk  - pt refusing bed or chair alarm    Isolation/Type: none  Tests/Procedures for NEXT shift: PT following, morning labs  Consults? (Pending/following, signed-off?) PT following  Where is patient from? (Home, TCU, etc.): home  Other Important info for NEXT shift: implanted pain pump in right lower back. [place] - but no hearing aids in place. Rib fractures from a recent fall  Anticipated DC date & active delays: planing on today 12/06 - home with outpt PT and home assist per PT eval  _____________________________________________________________________________  SUMMARY NOTE:  Orientation/Cognitive: A&O x4, [place]  Observation Goals (Met/ Not Met): not met  Mobility Level/Assist Equipment: SBA with walker and GB, pt is refusing bed- and chair alarm  Antibiotics & Plan (IV/po, length of tx left): n/a  Pain Management: pain pump in place, prn Fentanyl every 2 hr   Tele/VS/O2: Tele - SR/SB, VSS on RA, afebrile  ABNL Lab/BG: Troponins flat 88 - 86, Mg 2.5  Diet: regular diet  Bowel/Bladder: continent  Skin Concerns: scattered bruises, scabs on BLE, skin tear to left chest  Drains/Devices: PIV is sl'd, Tele  Patient Stated Goal for Today: rest and hopefully is able to go home

## 2023-12-06 NOTE — PLAN OF CARE
Goal Outcome Evaluation:    PRIMARY Concern: Pt here with lightheadedness and palpitation found out to be in SVT. Converted to SR now.   SAFETY RISK Concerns (fall risk, behaviors, etc.): fall risk       Isolation/Type: none   Tests/Procedures for NEXT shift: labs in am. Orthostatics VS every shift.   Consults? (Pending/following, signed-off?) EP consulted and following   Where is patient from? (Home, TCU, etc.): Home. Lives with spouse   Other Important info for NEXT shift: implanted pain pump to lower back. [place]. No hearing aides in place. Rib fracture from recent fall. Refusing alarm.   Anticipated DC date & active delays: tomorrow.     SUMMARY NOTE:  Orientation/Cognitive: A/O X4. [place]   Observation Goals (Met/ Not Met): not met   Mobility Level/Assist Equipment: up with SBA walker/GB   Antibiotics & Plan (IV/po, length of tx left): none  Pain Management: pain pump in place. Prn fentanyl q2h  Tele/VS/O2: VSS. SR   ABNL Lab/BG: Trop  flat in 80s. Mg 2.5  Diet: regular   Bowel/Bladder: continence   Skin Concerns: scattered bruises through out forearm and legs. Scabs to BLE, skin tear to L chest   Drains/Devices: PIV, Tele   Patient Stated Goal for Today: to feel better

## 2023-12-06 NOTE — PROGRESS NOTES
Pt alert and oriented.  At bedside report, pt sitting on chair.   Asked NA to place a chair alarm.  Per NA, pt refusing.   Oncoming  bedside RN discussed fall risks precautions with pt but pt still adamantly refusing chair or bed alarm. With continue to encourage fall precautions and monitor.

## 2023-12-06 NOTE — PLAN OF CARE
Observation goals  PRIOR TO DISCHARGE        Comments: -diagnostic tests and consults completed and resulted  Met  -vital signs normal or at patient baseline  Partially Met  -returns to baseline functional status  Partially Met  -safe disposition plan has been identified  Met  Nurse to notify provider when observation goals have been met and patient is ready for discharge.      Yes

## 2023-12-06 NOTE — PLAN OF CARE
"Goal Outcome Evaluation:    PRIMARY Concern: Pt here with lightheadedness and palpitation found out to be in SVT.   SAFETY RISK Concerns (fall risk, behaviors, etc.): fall risk  /refusing chair and bed alarm     Isolation/Type: none   Tests/Procedures for NEXT shift: PT following. Labs in am. Tele monitoring   Consults? (Pending/following, signed-off?) PT following   Where is patient from? (Home, TCU, etc.): Home. Lives with spouse   Other Important info for NEXT shift: implanted pain pump to lower back. [place]. No hearing aides in place. Rib fracture from recent fall.  Refresh eye drops given per request and fentanyl iv and lidocaine cream for neck pain  Anticipated DC date & active delays: possibly tomorrow    SUMMARY NOTE:  Orientation/Cognitive: Alert and oriented X4.   Observation Goals (Met/ Not Met): Partially met  Mobility Level/Assist Equipment: up with SBA with walker/GB ,  Pt refusing chair and bed alarm  Antibiotics & Plan (IV/po, length of tx left): No  Pain Management: pain pump in place own lower left back. Prn fentanyl q2h  Tele/VS/O2: VSS. SR   ABNL Lab/BG:BMP and Mg for am labs check  Skin Concerns: scattered bruises through out forearm and legs. Scabs to BLE, skin tear to L chest   Drains/Devices: [place] locked x2  Patient Stated Goal for Today: \"keep pain under control\"               "

## 2023-12-06 NOTE — PROGRESS NOTES
Observation goals  PRIOR TO DISCHARGE        Comments:     -diagnostic tests and consults completed and resulted: MET    -vital signs normal or at patient baseline: MET    -returns to baseline functional status; partially met    -safe disposition plan has been identified; Met    Nurse to notify provider when observation goals have been met and patient is ready for discharge.

## 2023-12-06 NOTE — PROGRESS NOTES
12/05/23 1818   Appointment Info   Signing Clinician's Name / Credentials (PT) Ayan Alicea DPT   Living Environment   People in Home spouse   Current Living Arrangements house   Home Accessibility stairs to enter home   Number of Stairs, Main Entrance 2   Stair Railings, Main Entrance none   Transportation Anticipated family or friend will provide   Living Environment Comments Reports that he lives in a house w/ his spouse. Either 2 stairs to enter from garage w/o railings or 2 platform steps from front.   Self-Care   Usual Activity Tolerance good   Current Activity Tolerance moderate   Equipment Currently Used at Home cane, straight;walker, rolling   Fall history within last six months yes   Number of times patient has fallen within last six months 3   Activity/Exercise/Self-Care Comment Reports typically independent w/ ADLs and mobility. Notes he bought a SEC ~6 months ago and usually uses outside the house. Notes over the past 3 weeks has been using pretty frequently inside the home. Reports when he was most recently DCed a few days ago from [facility] was given a walker but notes has barely had a chance to use it because he has only been home for ~1 day   General Information   Onset of Illness/Injury or Date of Surgery 12/04/23   Referring Physician Mary Ta PA-C   Patient/Family Therapy Goals Statement (PT) Return to home   Pertinent History of Current Problem (include personal factors and/or comorbidities that impact the POC) Eren James is a 78 year old male admitted on 12/4/2023. He presented to the emergency department for evaluation of intermittent shortness of breath and lightheadedness in the setting of intermittent SVT.  Recent hospitalization through [facility] system after fall and rib fractures where SVT was noted in the emergency department as well.   Existing Precautions/Restrictions fall   Cognition   Affect/Mental Status (Cognition) WFL   Orientation Status (Cognition)  oriented x 4   Follows Commands (Cognition) WFL   Pain Assessment   Patient Currently in Pain Yes, see Vital Sign flowsheet  (Reports his chronic pain)   Range of Motion (ROM)   ROM Comment WFLs for mobility and transfers no formal testing completed   Strength (Manual Muscle Testing)   Strength Comments WFLs for mobility and transfers no formal testing completed   Bed Mobility   Comment, (Bed Mobility) Defered seated in bedside chair   Transfers   Comment, (Transfers) Sit to stand w/ FWW and SBA   Gait/Stairs (Locomotion)   Comment, (Gait/Stairs) 10 ft w/ FWW and SBA   Balance   Balance Comments Improved balance noted w/ FWW compared to SEC   Clinical Impression   Criteria for Skilled Therapeutic Intervention Yes, treatment indicated   PT Diagnosis (PT) Impaired ambulation   Influenced by the following impairments Impaired strength, balance and activity tolerance   Functional limitations due to impairments Impaired ADLs, IADLs and functional mobility   Clinical Presentation (PT Evaluation Complexity) stable   Clinical Presentation Rationale Clinical judgment   Clinical Decision Making (Complexity) low complexity   Planned Therapy Interventions (PT) balance training;bed mobility training;gait training;home exercise program;patient/family education;stair training;strengthening;transfer training;progressive activity/exercise   Risk & Benefits of therapy have been explained evaluation/treatment results reviewed;care plan/treatment goals reviewed;risks/benefits reviewed;current/potential barriers reviewed;participants voiced agreement with care plan;participants included;patient   PT Total Evaluation Time   PT Eval, Low Complexity Minutes (39249) 10   Physical Therapy Goals   PT Frequency 6x/week   PT Predicted Duration/Target Date for Goal Attainment 12/15/23   PT Goals Bed Mobility;Transfers;Gait;Stairs   PT: Bed Mobility Independent;Supine to/from sit   PT: Transfers Modified independent;Sit to/from stand;Assistive  device   PT: Gait Modified independent;Greater than 200 feet;Rolling walker;Straight cane   PT: Stairs Modified independent;2 stairs   Interventions   Interventions Quick Adds Gait Training;Therapeutic Activity   Therapeutic Activity   Therapeutic Activities: dynamic activities to improve functional performance Minutes (96042) 10   Symptoms Noted During/After Treatment None   Treatment Detail/Skilled Intervention Pt seated in bedside chair at start of session. Agreeable to PT. Pt donning own shoes in sitting and able to tie the knots w/o assistance. Sit to stand x3 during session w/ FWW and SBA and x1 w/ SEC and CGA. Pt able to grab item from floor w/o AD, w/ SEC and w/ FWW and CGA. Pt w/ good sequencing noted. Improved balance noted when completing w/ SEC or FWW. Educated on use of FWW at home. Pt extensively talking to writer about self image and his decreasing mobility. Increased time for therapeutic listening. Educated on how to fold walker to use if going out in the community. Pt agreeable to use one at DC. Seated in bedside chair at end of session w/ call light in place and bed alarm on.   Gait Training   Gait Training Minutes (67436) 20   Symptoms Noted During/After Treatment (Gait Training) fatigue;shortness of breath   Treatment Detail/Skilled Intervention Pt ambulating ~250 ft w/ FWW and SBA. Good ector and balance noted. Cued for tall posture and keeping FWW closer to body. Pt frequently lifting FWW on turns w/ quick ector. Completing x2 platform stairs w/ FWW and SBA. Good sequencing noted. Completing x6 stairs w/ no railing and SEC w/ CGA progressing to SBA. Cued for correct sequencing w/ SEC. Pt then ambulating 200 ft w/ SEC and CGA progressing to close SBA. Reports increased fatigue and SOB after activity. SPO2 in the mid to high 90s on RA and HR in the 80s.   PT Discharge Planning   PT Plan Trial 4WW, activity tolerance, balance   PT Discharge Recommendation (DC Rec) home with outpatient physical  therapy;home with assist   PT Rationale for DC Rec Pt below baseline mobility but moving well. Reports before a month ago was independent w/ mobility and ADLs w/o AD. Was walking his dog far distances. Pt w/ recent hospital admission at [facility] and DCed home w/ FWW. Has not had much of a chance to use it d/t coming back to the ED and then being admitted here. Currently SBA w/ FWW. Anticipate when medically ready Pt can DC home w/ use of FWW and assistance from spouse as well as OP PT to improve upon functional mobility and strengthening.   PT Brief overview of current status SBA w/ FWW

## 2023-12-06 NOTE — PROGRESS NOTES
MD Notification    Notified Person: MD    Notified Person Name:dr Cote    Notification Date/Time: 12/5/2023 4224      Notification Interaction: via amcon    Purpose of Notification: pt requesting lidocaine cream for neck pain. Pt takes it at home    Orders Received:    Comments:

## 2023-12-06 NOTE — UTILIZATION REVIEW
"  Admission Status; Secondary Review Determination         Under the authority of the Utilization Management Committee, the utilization review process indicated a secondary review on the above patient.  The review outcome is based on review of the medical records, discussions with staff, and applying clinical experience noted on the date of the review.          (x) Observation Status Appropriate - This patient does not meet hospital inpatient criteria and is placed in observation status. If this patient's primary payer is Medicare and was admitted as an inpatient, Condition Code 44 should be used and patient status changed to \"observation\".     RATIONALE FOR DETERMINATION     78 year old male with pmh of SVT, chronic pain, cad, and ARLEN who was registered to observation on 12/4 due to intermittent sob attributed to paroxysmal SVT. EKG here only with sinus tachycardia and no recurrence of SVT although reportedly 2 occurances over the last week PTA. His oral metoprolol was increased CT CAP is without acute findings. BP was somewhat low today at 91/56 for 1 reading, prompting for some adjustments to his medications and a planned EP consult.     Given no recurrence of SVT and rapid improvement of blood pressure after small fluid bowel on 12/6 would continue observation status, but would rereview if his clinical situation changes.    The severity of illness, intensity of service provided, expected LOS and risk for adverse outcome make the care appropriate for further observation; however, doesn't meet criteria for hospital inpatient admission.     This document was produced using voice recognition software.      The information on this document is developed by the utilization review team in order for the business office to ensure compliance.  This only denotes the appropriateness of proper admission status and does not reflect the quality of care rendered.         The definitions of Inpatient Status and Observation Status " used in making the determination above are those provided in the CMS Coverage Manual, Chapter 1 and Chapter 6, section 70.4.      Sincerely,     Cornel Bateman DO   Physician Advisor  Utilization Management  [facility].

## 2023-12-07 VITALS
HEART RATE: 63 BPM | DIASTOLIC BLOOD PRESSURE: 72 MMHG | SYSTOLIC BLOOD PRESSURE: 137 MMHG | TEMPERATURE: 97.1 F | RESPIRATION RATE: 17 BRPM | OXYGEN SATURATION: 97 %

## 2023-12-07 PROCEDURE — 250N000013 HC RX MED GY IP 250 OP 250 PS 637: Performed by: INTERNAL MEDICINE

## 2023-12-07 PROCEDURE — 96376 TX/PRO/DX INJ SAME DRUG ADON: CPT

## 2023-12-07 PROCEDURE — 250N000011 HC RX IP 250 OP 636: Performed by: PHYSICIAN ASSISTANT

## 2023-12-07 PROCEDURE — G0378 HOSPITAL OBSERVATION PER HR: HCPCS

## 2023-12-07 PROCEDURE — 99239 HOSP IP/OBS DSCHRG MGMT >30: CPT | Performed by: INTERNAL MEDICINE

## 2023-12-07 PROCEDURE — 250N000013 HC RX MED GY IP 250 OP 250 PS 637: Performed by: STUDENT IN AN ORGANIZED HEALTH CARE EDUCATION/TRAINING PROGRAM

## 2023-12-07 RX ORDER — METOPROLOL SUCCINATE 25 MG/1
25 TABLET, EXTENDED RELEASE ORAL DAILY
Qty: 90 TABLET | Refills: 0 | Status: SHIPPED | OUTPATIENT
Start: 2023-12-07 | End: 2024-03-21

## 2023-12-07 RX ORDER — LOSARTAN POTASSIUM 100 MG/1
50 TABLET ORAL DAILY
Status: ON HOLD | COMMUNITY
Start: 2023-12-07 | End: 2024-03-03

## 2023-12-07 RX ADMIN — ASPIRIN 81 MG: 81 TABLET, COATED ORAL at 07:54

## 2023-12-07 RX ADMIN — POTASSIUM CHLORIDE 20 MEQ: 1500 TABLET, EXTENDED RELEASE ORAL at 07:54

## 2023-12-07 RX ADMIN — LOSARTAN POTASSIUM 50 MG: 50 TABLET, FILM COATED ORAL at 07:53

## 2023-12-07 RX ADMIN — BUPROPION HYDROCHLORIDE 150 MG: 150 TABLET, FILM COATED, EXTENDED RELEASE ORAL at 07:53

## 2023-12-07 RX ADMIN — FENTANYL CITRATE 21 MCG: 50 INJECTION, SOLUTION INTRAMUSCULAR; INTRAVENOUS at 05:35

## 2023-12-07 RX ADMIN — PANTOPRAZOLE SODIUM 40 MG: 40 TABLET, DELAYED RELEASE ORAL at 07:54

## 2023-12-07 RX ADMIN — DULOXETINE HYDROCHLORIDE 60 MG: 60 CAPSULE, DELAYED RELEASE ORAL at 07:54

## 2023-12-07 RX ADMIN — TRAZODONE HYDROCHLORIDE 75 MG: 50 TABLET ORAL at 00:20

## 2023-12-07 RX ADMIN — FENTANYL CITRATE 21 MCG: 50 INJECTION, SOLUTION INTRAMUSCULAR; INTRAVENOUS at 08:00

## 2023-12-07 RX ADMIN — NALOXEGOL OXALATE 25 MG: 25 TABLET, FILM COATED ORAL at 06:35

## 2023-12-07 RX ADMIN — Medication 400 MG: at 07:54

## 2023-12-07 RX ADMIN — METOPROLOL SUCCINATE 25 MG: 25 TABLET, EXTENDED RELEASE ORAL at 07:54

## 2023-12-07 RX ADMIN — TAMSULOSIN HYDROCHLORIDE 0.4 MG: 0.4 CAPSULE ORAL at 07:54

## 2023-12-07 RX ADMIN — ISOSORBIDE MONONITRATE 60 MG: 60 TABLET, EXTENDED RELEASE ORAL at 07:54

## 2023-12-07 RX ADMIN — FENTANYL CITRATE 21 MCG: 50 INJECTION, SOLUTION INTRAMUSCULAR; INTRAVENOUS at 11:18

## 2023-12-07 ASSESSMENT — ACTIVITIES OF DAILY LIVING (ADL)
ADLS_ACUITY_SCORE: 35

## 2023-12-07 NOTE — CONSULTS
Inpatient Cardiology Consultation.   [facility]  Date of Admission: 12/4/2023  Date of Consult: December 6, 2023    PRIMARY CARDIOLOGY TEAM:  [facility].    REASON FOR CONSULT:  1. paroxysmal supraventricular tachycardia.  2.  Hypotension while on antihypertensive drugs.    DIAGNOSES/ASSESSMENT:  Symptomatic brief episodes of paroxysmal supraventricular tachycardia.  Normal LVEF.  His beta-blocker was reduced recently by his cardiology team.  Recommend increasing the dosage.  He has preserved LVEF, no significant valve disease, labs are reassuring.  History of 6% premature ventricular complexes.  This is a known diagnosis and he has been on metoprolol for rate with normal LVEF.  Metoprolol dosage was recently decreased by his cardiology team at an outpatient visit a couple of months ago.  Unclear why.  Intermittent hypotension.  He is on metoprolol, 40-dose losartan, torsemide 20 mg, Imdur 60 mg and takes metolazone as needed (not sure why as he does not have congestive heart failure).  Recommend cutting back on doses as below.  Noncardiac chest pain.  From recent rib fracture.  He walks every day and has not had recurrence of angina for several years.  Chronic dyspnea on exertion.  Has had a thorough evaluation at [facility] and deemed multifactorial.  He does not have congestive heart failure.  Benign essential hypertension.  Needs medication adjustment due to low blood pressure.  Hyperlipidemia.  Continue lovastatin 40 mg daily.  Benign essential hypertension.  Uses his inspire device consistently.  Known bronchiectasis and COPD and large pulmonary AV fistula.    PLAN:  Increase metoprolol XL to 25 mg twice daily.  Currently on 12.5 mg daily.  Decrease losartan from 100 mg daily to 50 mg daily.  Continue Imdur 60 mg daily.  Decrease torsemide to 10 mg daily.  Stop metolazone.  In 1 month, repeat heart monitor and follow-up with his [facility] cardiology team.  No  other inpatient cardiac testing indicated.  Plan of care discussed in detail with the patient, his questions answered and patient's RN updated.  Cardiology will sign off.  Thank you for consulting us.      Ruslan Kolb MD, MD [facility]  Cardiology      CODE STATUS:  Full Code      HISTORY OF PRESENT ILLNESS:  Eren James is a 78 year old male, who follows with [facility] cardiology.  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    More recently he has been having symptomatic paroxysmal supraventricular tachycardia during which episodes he gets palpitations but no chest pain or dyspnea.  Recently was in the ER in the context of broken ribs and chest pain and myocardial infarction was ruled out.     He presented to [facility] ER on 12/4/2023 with palpitations and generalized weakness.  Normal , normal BP, ECG showed sinus tachycardia with occasional premature ventricular complexes, pulmonary embolus ruled out on chest CT, noted to have known fractures of the left eighth and ninth ribs, normal NT proBNP, high-sensitivity troponin T minimally elevated and flat at 88 --86, normal renal panel with a creatinine of 0.89, hemoglobin  "11.6, normal TSH.    Cardiology consulted for very brief episodes of PSVT lasting a few seconds on telemetry and blood pressure dipping down to 91/56.  Patient is largely asymptomatic.    Recent transthoracic cardiogram dated\"2023 in Care Everywhere shows normal LV size and function, LVEF 60%, stable ascending relation of 4.0 cm, no significant valvular heart disease, normal right ventricular systolic function.  Severely dilated left atrium.    Reviewed extensive outside medical records in Care Everywhere ([facility], cardiology clinic notes, ER notes, multiple serial cardiology testing including labs, ECGs, heart monitor, chest x-ray, echocardiogram, stress test, as documented above).    I reviewed results of multiple labs as documented above -CBC, BMP, liver panel, thyroid panel, cardiac biomarkers, independently interpreted ECG tracing, reviewed chest CT.    REVIEW OF SYSTEMS:  A comprehensive 10 point review of systems was completed and the pertinent positives are documented in history of present illness.    FAMILY HISTORY:  Family History   Problem Relation Age of Onset    Cardiovascular Mother         living. hx bypass.    Cardiovascular Father         .cardiomegaly.    Diabetes Sister                MEDICATIONS:  Prior to Admission Medications   Prescriptions Last Dose Informant Patient Reported? Taking?   B Complex CAPS Past Week Self Yes Yes   Sig: Take 1 tablet by mouth daily   DULoxetine (CYMBALTA) 30 MG capsule 2023 Self Yes Yes   Sig: Take 60 mg by mouth every morning   FLOMAX 0.4 MG OR  Self No Yes   Si TABLET DAILY AFTER A MEAL   MULTIVITAMINS OR TABS Past Week Self Yes Yes   Si tablet daily   NARCAN 4 MG/0.1ML nasal spray prn Self Yes Yes   Sig: once as needed   Potassium Chloride MONICA 2023 Self Yes Yes   Sig: Take 20 mEq by mouth daily   acetaminophen (TYLENOL) 500 MG tablet prn Self Yes Yes   Sig: Take 1,000 mg by mouth every 6 hours as needed "   acyclovir (ZOVIRAX) 200 MG capsule prn Self Yes Yes   Sig: TAKE 2 CAPSULES BY MOUTH TWICE DAILY FOR 5 DAYS AS NEEDED FOR COLD SORES   allopurinol (ZYLOPRIM) 300 MG tablet 12/4/2023 Self Yes Yes   Sig: TAKE 1 TABLET(300 MG) BY MOUTH EVERY DAY Strength: 300 mg   aspirin 81 MG EC tablet 12/4/2023 Self Yes Yes   Sig: Take 81 mg by mouth daily   atorvastatin (LIPITOR) 40 MG tablet 12/4/2023 Self Yes Yes   Sig: Take 40 mg by mouth daily   buPROPion (WELLBUTRIN XL) 150 MG 24 hr tablet 12/4/2023 Self Yes Yes   Sig: Take 150 mg by mouth every morning   chlorhexidine (PERIDEX) 0.12 % solution 12/4/2023 Self Yes Yes   Sig: SWISH AND SPIT 15 ML BY MOUTH TWICE DAILY   dicyclomine (BENTYL) 20 MG tablet prn Self Yes Yes   Sig: Take 20 mg by mouth 3 times daily as needed   finasteride (PROSCAR) 5 MG tablet 12/4/2023 Self Yes Yes   Sig: Take 1 tablet by mouth daily at 2 pm   fluorometholone (FML LIQUIFILM) 0.1 % ophthalmic suspension prn Self Yes Yes   Sig: Place 1 drop into both eyes as needed (dry eyes)   fluticasone (FLONASE) 50 MCG/ACT nasal spray prn Self Yes Yes   Sig: SHAKE LIQUID WELL AND INHALE 2 SPRAYS INTO AFFECTED NOSTRILS TWICE DAILY AS NEEDED FOR RHINITIS   isosorbide mononitrate (IMDUR) 60 MG 24 hr tablet 12/4/2023 Self Yes Yes   Sig: Take 60 mg by mouth daily   lidocaine (XYLOCAINE) 5 % external ointment  Self Yes Yes   Sig: APPLY TOPICALLY TO THE AFFECTED AREA THREE TIMES DAILY AS NEEDED   losartan (COZAAR) 100 MG tablet 12/4/2023 Self Yes No   Sig: Take 100 mg by mouth daily   losartan (COZAAR) 100 MG tablet   Yes Yes   Sig: Take 0.5 tablets (50 mg) by mouth daily   medication given by implanted intrathecal pump   Yes Yes   Sig: continuous Drug # 1: Bupivacaine (Marcaine)  - Conc:25 mg/mL - Total Dose / 24 hours: 1025 mg    Drug # 2: Hydromorphone (Dilaudid)  - Conc:0.3 mg/mL - Total Dose / 24 hours: 12.3 mg    Drug # 3: Fentanyl (Sublimaze) - Conc: 1667 mcg/mL - Total Dose / 24 hours: 62327 mcg    Diluent:  NS    May give up to 5 boluses per day of hydromorphone at 0.1mg, fentanyl at 40 mcg and bupivacaine 0.6 mg in a 24 hour period   Pump Reservoir Volume: 40 mL  Outside Clinic & Provider: [facility]   Last Refill Date: 2023  Next Refill Date: 2024  [place] Alarm Date: 2024  Pump : [employer]     Please consider consulting the pain team if the patient is admitted with an IT pump.   metolazone (ZAROXOLYN) 5 MG tablet prn Self Yes No   Sig: TAKE 1 TABLET BY MOUTH EVERY DAY AS NEEDED FOR WEIGHT GAIN OR LEG SWELLING   metoprolol succinate ER (TOPROL XL) 25 MG 24 hr tablet 2023  Yes No   Sig: Take 25 mg by mouth daily   naloxegol 25 MG TABS tablet 2023 Self Yes Yes   Sig: Take 25 mg by mouth every morning (before breakfast)   nitroGLYcerin (NITROSTAT) 0.4 MG sublingual tablet prn Self Yes No   Sig: DISSOLVE 1 TABLET UNDER THE TONGUE AS NEEDED FOR CHEST PAIN EVERY 5 MINUTES AS NEEDED AS DIRECTED   ondansetron (ZOFRAN ODT) 4 MG ODT tab prn Self Yes No   Sig: DISSOLVE 1 TABLET(4 MG) ON THE TONGUE EVERY 8 HOURS AS NEEDED FOR NAUSEA OR VOMITING   pantoprazole (PROTONIX) 40 MG EC tablet 2023 Self Yes Yes   Sig: Take 40 mg by mouth daily   torsemide (DEMADEX) 20 MG tablet 2023 Self Yes Yes   Sig: Take 1 tablet by mouth daily   traZODone (DESYREL) 50 MG tablet 12/3/2023 Self Yes Yes   Sig: Take 75 mg by mouth at bedtime      Facility-Administered Medications: None       HOME MEDICATIONS:  Prior to Admission Medications   Prescriptions Last Dose Informant Patient Reported? Taking?   B Complex CAPS Past Week Self Yes Yes   Sig: Take 1 tablet by mouth daily   DULoxetine (CYMBALTA) 30 MG capsule 2023 Self Yes Yes   Sig: Take 60 mg by mouth every morning   FLOMAX 0.4 MG OR  Self No Yes   Si TABLET DAILY AFTER A MEAL   MULTIVITAMINS OR TABS Past Week Self Yes Yes   Si tablet daily   NARCAN 4 MG/0.1ML nasal spray prn Self Yes Yes   Sig: once as needed    Potassium Chloride MONICA 12/4/2023 Self Yes Yes   Sig: Take 20 mEq by mouth daily   acetaminophen (TYLENOL) 500 MG tablet prn Self Yes Yes   Sig: Take 1,000 mg by mouth every 6 hours as needed   acyclovir (ZOVIRAX) 200 MG capsule prn Self Yes Yes   Sig: TAKE 2 CAPSULES BY MOUTH TWICE DAILY FOR 5 DAYS AS NEEDED FOR COLD SORES   allopurinol (ZYLOPRIM) 300 MG tablet 12/4/2023 Self Yes Yes   Sig: TAKE 1 TABLET(300 MG) BY MOUTH EVERY DAY Strength: 300 mg   aspirin 81 MG EC tablet 12/4/2023 Self Yes Yes   Sig: Take 81 mg by mouth daily   atorvastatin (LIPITOR) 40 MG tablet 12/4/2023 Self Yes Yes   Sig: Take 40 mg by mouth daily   buPROPion (WELLBUTRIN XL) 150 MG 24 hr tablet 12/4/2023 Self Yes Yes   Sig: Take 150 mg by mouth every morning   chlorhexidine (PERIDEX) 0.12 % solution 12/4/2023 Self Yes Yes   Sig: SWISH AND SPIT 15 ML BY MOUTH TWICE DAILY   dicyclomine (BENTYL) 20 MG tablet prn Self Yes Yes   Sig: Take 20 mg by mouth 3 times daily as needed   finasteride (PROSCAR) 5 MG tablet 12/4/2023 Self Yes Yes   Sig: Take 1 tablet by mouth daily at 2 pm   fluorometholone (FML LIQUIFILM) 0.1 % ophthalmic suspension prn Self Yes Yes   Sig: Place 1 drop into both eyes as needed (dry eyes)   fluticasone (FLONASE) 50 MCG/ACT nasal spray prn Self Yes Yes   Sig: SHAKE LIQUID WELL AND INHALE 2 SPRAYS INTO AFFECTED NOSTRILS TWICE DAILY AS NEEDED FOR RHINITIS   isosorbide mononitrate (IMDUR) 60 MG 24 hr tablet 12/4/2023 Self Yes Yes   Sig: Take 60 mg by mouth daily   lidocaine (XYLOCAINE) 5 % external ointment  Self Yes Yes   Sig: APPLY TOPICALLY TO THE AFFECTED AREA THREE TIMES DAILY AS NEEDED   losartan (COZAAR) 100 MG tablet 12/4/2023 Self Yes No   Sig: Take 100 mg by mouth daily   losartan (COZAAR) 100 MG tablet   Yes Yes   Sig: Take 0.5 tablets (50 mg) by mouth daily   medication given by implanted intrathecal pump   Yes Yes   Sig: continuous Drug # 1: Bupivacaine (Marcaine)  - Conc:25 mg/mL - Total Dose / 24 hours: 1025  mg    Drug # 2: Hydromorphone (Dilaudid)  - Conc:0.3 mg/mL - Total Dose / 24 hours: 12.3 mg    Drug # 3: Fentanyl (Sublimaze) - Conc: 1667 mcg/mL - Total Dose / 24 hours: 11874 mcg    Diluent: NS    May give up to 5 boluses per day of hydromorphone at 0.1mg, fentanyl at 40 mcg and bupivacaine 0.6 mg in a 24 hour period   Pump Reservoir Volume: 40 mL  Outside Clinic & Provider: [facility]   Last Refill Date: 12/04/2023  Next Refill Date: 1/16/2024  [place] Alarm Date: 1/20/2024  Pump : [employer]     Please consider consulting the pain team if the patient is admitted with an IT pump.   metolazone (ZAROXOLYN) 5 MG tablet prn Self Yes No   Sig: TAKE 1 TABLET BY MOUTH EVERY DAY AS NEEDED FOR WEIGHT GAIN OR LEG SWELLING   metoprolol succinate ER (TOPROL XL) 25 MG 24 hr tablet 12/4/2023  Yes No   Sig: Take 25 mg by mouth daily   naloxegol 25 MG TABS tablet 12/4/2023 Self Yes Yes   Sig: Take 25 mg by mouth every morning (before breakfast)   nitroGLYcerin (NITROSTAT) 0.4 MG sublingual tablet prn Self Yes No   Sig: DISSOLVE 1 TABLET UNDER THE TONGUE AS NEEDED FOR CHEST PAIN EVERY 5 MINUTES AS NEEDED AS DIRECTED   ondansetron (ZOFRAN ODT) 4 MG ODT tab prn Self Yes No   Sig: DISSOLVE 1 TABLET(4 MG) ON THE TONGUE EVERY 8 HOURS AS NEEDED FOR NAUSEA OR VOMITING   pantoprazole (PROTONIX) 40 MG EC tablet 12/4/2023 Self Yes Yes   Sig: Take 40 mg by mouth daily   torsemide (DEMADEX) 20 MG tablet 12/4/2023 Self Yes Yes   Sig: Take 1 tablet by mouth daily   traZODone (DESYREL) 50 MG tablet 12/3/2023 Self Yes Yes   Sig: Take 75 mg by mouth at bedtime      Facility-Administered Medications: None       ALLERGIES:  Allergies   Allergen Reactions    Armodafinil Other (See Comments)    No Known Drug Allergy        PAST MEDICAL HISTORY:  Past Medical History:   Diagnosis Date    Aneurysm of other visceral artery     See [facility]    Backache, unspecified     Chronic back pain; takes MS Contin    Esophageal reflux      Hypertrophy of prostate with urinary obstruction and other lower urinary tract symptoms (LUTS)     Other and unspecified hyperlipidemia     Other testicular hypofunction     Unspecified cardiovascular disease     Previous coronary angioplasties    Unspecified essential hypertension     Urinary calculus, unspecified        PAST SURGICAL HISTORY:  Past Surgical History:   Procedure Laterality Date    [facility] NONSPECIFIC PROCEDURE  1970    Colon surg for diverticulitis    [facility] NONSPECIFIC PROCEDURE  1973    T&A    [facility] NONSPECIFIC PROCEDURE      Coronary angioplasties    [facility] NONSPECIFIC PROCEDURE      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 He has never used smokeless tobacco. He reports that he does not drink alcohol and does not use drugs.      PHYSICAL EXAMINATION:  Temp: 97.1  F (36.2  C) Temp src: Axillary (.) BP: 137/72 Pulse: 63   Resp: 17 SpO2: 97 % (Simultaneous filing. User may not have seen previous data.) O2 Device: None (Room air) (Simultaneous filing. User may not have seen previous data.)    12/02 0700 - 12/07 0659  In: 360 [P.O.:360]  Out: -   Net: 360  There were no vitals filed for this visit.    Constitutional: Comfortable at rest. Cooperative, alert, well developed, well nourished. Normal BMI.   Eyes: Pupils reactive, no icterus. Mild pallor.   ENT: No cyanosis. Moist mucous membranes.  Cardiovascular: Normal jugular venous pulse and pressure.  Normal carotid pulse character and volume.  No carotid bruit.  Normal apical impulse.  Regular heart sounds.  No S3 or S4.  No murmur or friction rub.   Respiratory: Normal respiratory effort with symmetrical chest wall movements and no use of accessory muscles. Bilateral normal breath sounds. No rales or wheeze.  GI: Soft, nontender, active bowel sounds.  No hepatosplenomegaly, ascites or abdominal wall edema.   Skin: No erythema or ecchymosis. No pertinent skin  findings.  Musculoskeletal: Normal muscle tone and strength.    Neuropsychiatric: Oriented to time place and person.  Affect normal.  No gross motor deficits.  Extremities: No edema or clubbing.    Clinically Significant Risk Factors Present on Admission                # Drug Induced Platelet Defect: home medication list includes an antiplatelet medication   # Hypertension: Noted on problem list                Cardiac Arrhythmia: Paroxysmal tachycardia, unspecified      Mothilal Maricel Kolb MD, MD

## 2023-12-07 NOTE — PLAN OF CARE
PRIMARY Concern: Palpitations, SOB and generalized weakness, admission in ED pt was in SVT- converted to SR  SAFETY RISK Concerns (fall risk, behaviors, etc.): Fall risk  - pt refusing bed or chair alarm    Isolation/Type: none  Tests/Procedures for NEXT shift: monitor BP  Consults? (Pending/following, signed-off?) EP consulted today   Where is patient from? (Home, TCU, etc.): home  Other Important info for NEXT shift: implanted pain pump in right lower back. [place] - but no hearing aids in place. Rib fractures from a recent fall  Anticipated DC date & active delays:[place] later in the evening    SUMMARY NOTE:  Orientation/Cognitive: A&O x4, [place]  Observation Goals (Met/ Not Met): not met  Mobility Level/Assist Equipment: SBA with walker and GB, pt is refusing bed- and chair alarm  Antibiotics & Plan (IV/po, length of tx left): n/a  Pain Management: pain pump in place, prn Fentanyl every 2 hr   Tele/VS/O2: Tele - SR/SB, VSS on RA, afebrile  ABNL Lab/BG: See chart  Diet: regular diet  Bowel/Bladder: continent  Skin Concerns: scattered bruises, scabs on BLE, skin tear to left chest  Drains/Devices: PIV is sl'd, Tele  Patient Stated Goal for Today: rest and hopefully is able to go home    Observation Goals      -diagnostic tests and consults completed and resulted: not met     -vital signs normal or at patient baseline: met     -returns to baseline functional status: partially met. Lives with wife.      -safe disposition plan has been identified: met     Nurse to notify provider when observation goals have been met and patient is ready for discharge

## 2023-12-07 NOTE — DISCHARGE SUMMARY
PRIMARY Concern: Palpitations, SOB and generalized weakness, admission in ED pt was in SVT- converted to SR  Orientation/Cognitive: A&O x4  Observation Goals (Met/ Not Met): met  Tele/VS/O2: VSS on RA    Pt will be discharging home with wife, and has all of his belongings. He understands all discharge information, and all question have been answered. Pt will be picking up his medication from his pharmacy.

## 2023-12-07 NOTE — PROGRESS NOTES
Observation Goals      -diagnostic tests and consults completed and resulted: not met    -vital signs normal or at patient baseline: met    -returns to baseline functional status: partially met. Lives with wife.     -safe disposition plan has been identified: met    Nurse to notify provider when observation goals have been met and patient is ready for discharge

## 2023-12-07 NOTE — PLAN OF CARE
Observation Goals      -diagnostic tests and consults completed and resulted: met     -vital signs normal or at patient baseline: met     -returns to baseline functional status: partially met, wife helps with cares     -safe disposition plan has been identified: met     Nurse to notify provider when observation goals have been met and patient is ready for discharge

## 2023-12-07 NOTE — PLAN OF CARE
Physical Therapy Discharge Summary    Reason for therapy discharge:    All goals and outcomes met, no further needs identified.    Progress towards therapy goal(s). See goals on Care Plan in Epic electronic health record for goal details.  Goals met    Therapy recommendation(s):    Pt below baseline mobility but moving well. Reports before a month ago was independent w/ mobility and ADLs w/o AD. Was walking his dog far distances. Pt w/ recent hospital admission at [facility] and DCed home w/ FWW. Has not had much of a chance to use it d/t coming back to the ED and then being admitted here. Currently SBA w/ FWW. Anticipate when medically ready Pt can DC home w/ use of FWW and assistance from spouse as well as OP PT to improve upon functional mobility and strengthening.

## 2023-12-07 NOTE — DISCHARGE SUMMARY
[facility]    Discharge Summary  Hospitalist    Date of Admission:  12/4/2023  Date of Discharge:  12/7/2023 12:03 PM  Discharging Provider: Adan Rincon MD, MD  Date of Service (when I saw the patient): 12/07/23    Discharge Diagnoses   Please refer below    History of Present Illness   Eren James is an 78 year old male who presented with lightheadedness and shortness of breath    Hospital Course   Eren James is a 78 year old male admitted on 12/4/2023. He presented to the emergency department for evaluation of intermittent shortness of breath and lightheadedness in the setting of intermittent SVT.  Recent hospitalization through [facility] after fall and rib fractures where SVT was noted in the emergency department as well.         Final discharge diagnoses and hospital course     Paroxysmal supraventricular tachycardia: Symptomatic and recurrent  *Symptomatic with lightheadedness and dyspnea. Known history of SVT identified on 48-hour Holter monitor August 2, 2022 prior to switch from labetalol to metoprolol through cardiology clinic.  *Metoprolol XL increased from 12.5 mg daily to 25 mg daily.   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  No significant valvular abnormality noted.  -No pulmonary embolism noted on CT chest abdomen pelvis 11/30/2023      Cardiology was consulted as well, appreciate their input.  At this time the patient will continue metoprolol 25 mg XL as heart rate is well-controlled with that.  Given his borderline low blood pressure no history of CHF, has been recommended to cut down the losartan 50 mg daily and use torsemide only as needed and discontinue the metolazone.    At this time giving history of paroxysmal supraventricular tachycardia, recommend to  follow-up with his cardiology, the patient will benefit from EP evaluation outpatient for evaluation of ablation for SVT.     Hypotension with lightheadedness  Patient had episodes of low blood pressure on 12/6/2023  He was symptomatic, and feel fatigued, blood pressure was 91 x 56.  At this time we will give gentle IV fluids at 100 mill per hour for 4 to 5 hours.  Cut down his losartan from 100 to 50 mg daily  His blood pressure remained stable at this time, no further episodes of low blood pressure.  The patient awake alert and ambulating well without any difficulty at this time.     Hypomagnesemia: Replaced  *Mag 1.6 on admission, now up to 2.5  -RN replacement protocol  -Prior to admission torsemide on hold, patient has no significant swelling/edema, no history of CHF either.  Recommend to use torsemide only as needed if the weight goes up 3 pounds.  Also ask him to follow-up with his primary care physician and cardiology as outpatient for torsemide recommendation     Chronic pain syndrome: Controlled  *Patient with implanted pain pump. Follows with [facility] pain clinic and was actually seen earlier 12/4 for pump refill. Primarily pain is in low back, but also with complaints of neuropathy of lower extremities related to his spinal stenosis.  -Continue Fentanyl injection 0.25 mcg/kg q2h prn for severe pain (pt w/o his bolus controller) he reports this is currently working well for him. His pump utilizes boluses of Hydromorphone 0.1 mg, Fentanyl 40 mcg, and Bupivacaine 0.6 mg up to 5 times per day.  -Follow-up with pain team as outpatient  -Continue naloxegol 25 mg daily  -Continue Cymbalta 60 mg every morning, Wellbutrin 150 mg every morning  -Continue trazodone 75 mg at bedtime     Coronary artery disease: History of two-vessel coronary artery bypass grafting with LIMA to LAD and SVG to diagonal.  Hypertension:  Non-ST elevation myocardial infarction: Suspect type II in the setting of demand ischemia with SVT rates  in the 170s range. Similar elevation in his troponin historically including during last admission through [facility] system. Troponin trend stable. No inducible ischemia on nuclear medicine stress test January 2023.  -I continue metoprolol XL 25 mg daily, as he was taking at home.  -Cut down losartan to 50 mg daily given low blood pressure this morning  -Continue aspirin 81 mg daily  -Can resume prior to admission atorvastatin 40 mg daily at discharge.      GERD:  -Continue Protonix 40 mg daily     Recent left 7-9 rib fractures: Following a fall which took place approximately 11/27/2023.  -Continue with pulmonary toilet. Importance of deep inspiration and ongoing movement discussed with patient on admission.     Obstructive sleep apnea: Utilizes inspire device, but has not been using in the past week or so.  Reports that he has had some daytime fatigue and fell asleep playing cards with friends the other day.  Discussed that daytime somnolence is often a symptom of untreated sleep apnea and encouraged him to use his inspire device.  -Oximetry, oxygen as needed  -Resume use of inspire device when able        Clinically Significant Risk Factors Present on Admission             # Hypomagnesemia: Lowest Mg = 1.6 mg/dL in last 2 days, will replace as needed     # Drug Induced Platelet Defect: home medication list includes an antiplatelet medication   # Hypertension: Noted on problem list                        Diet: Regular Diet Adult       Code Status: Full Code     Adan Rincon MD, MD    Significant Results and Procedures       Pending Results   These results will be followed up by PCP  Unresulted Labs Ordered in the Past 30 Days of this Admission       No orders found from 11/4/2023 to 12/5/2023.            Code Status   Full Code       Primary Care Physician   Jakob Conner MD    Physical Exam   Temp: 97.1  F (36.2  C) Temp src: Axillary (.) BP: 137/72 Pulse: 63   Resp: 17 SpO2: 97 % (Simultaneous filing. User may not  have seen previous data.) O2 Device: None (Room air) (Simultaneous filing. User may not have seen previous data.)    There were no vitals filed for this visit.  Vital Signs with Ranges  Temp:  [97.1  F (36.2  C)-98.3  F (36.8  C)] 97.1  F (36.2  C)  Pulse:  [60-78] 63  Resp:  [16-20] 17  BP: (100-137)/(65-79) 137/72  SpO2:  [95 %-99 %] 97 %  I/O last 3 completed shifts:  In: 240 [P.O.:240]  Out: -     Constitutional: awake, alert, cooperative, no apparent distress, and appears stated age  Eyes: Lids and lashes normal, pupils equal, round and reactive to light, extra ocular muscles intact, sclera clear, conjunctiva normal  Respiratory: No increased work of breathing, good air exchange, clear to auscultation bilaterally, no crackles or wheezing  Cardiovascular: Normal apical impulse, regular rate and rhythm, normal S1 and S2, no S3 or S4, and no murmur noted  GI: No scars, normal bowel sounds, soft, non-distended, non-tender, no masses palpated, no hepatosplenomegally  Neurologic: No focal deficit    Discharge Disposition   Discharged to home  Condition at discharge: Stable    Consultations This Hospital Stay   PHYSICAL THERAPY ADULT IP CONSULT  ELECTROPHYSIOLOGY IP CONSULT    Time Spent on this Encounter   IAdan MD, personally saw the patient today and spent greater than 30 minutes discharging this patient.    Discharge Orders      Physical Therapy Referral      Reason for your hospital stay    SVT     Follow-up and recommended labs and tests     Follow up with primary care provider, Jakob Conner MD, within 7 days for hospital follow- up.  No follow up labs or test are needed.  Follow up with your Cardiologist     Activity    Your activity upon discharge: activity as tolerated     Diet    Follow this diet upon discharge: Orders Placed This Encounter      Regular Diet Adult     Discharge Medications   Discharge Medication List as of 12/7/2023 11:13 AM        CONTINUE these medications which have CHANGED     Details   losartan (COZAAR) 100 MG tablet Take 0.5 tablets (50 mg) by mouth daily, Historical      metoprolol succinate ER (TOPROL XL) 25 MG 24 hr tablet Take 1 tablet (25 mg) by mouth daily, Disp-90 tablet, R-0, E-Prescribe           CONTINUE these medications which have NOT CHANGED    Details   acetaminophen (TYLENOL) 500 MG tablet Take 1,000 mg by mouth every 6 hours as needed, Historical      acyclovir (ZOVIRAX) 200 MG capsule TAKE 2 CAPSULES BY MOUTH TWICE DAILY FOR 5 DAYS AS NEEDED FOR COLD SORES, Historical      allopurinol (ZYLOPRIM) 300 MG tablet TAKE 1 TABLET(300 MG) BY MOUTH EVERY DAY Strength: 300 mg, Historical      aspirin 81 MG EC tablet Take 81 mg by mouth daily, Historical      atorvastatin (LIPITOR) 40 MG tablet Take 40 mg by mouth daily, Historical      B Complex CAPS Take 1 tablet by mouth daily, Historical      buPROPion (WELLBUTRIN XL) 150 MG 24 hr tablet Take 150 mg by mouth every morning, Historical      chlorhexidine (PERIDEX) 0.12 % solution SWISH AND SPIT 15 ML BY MOUTH TWICE DAILY, Historical      dicyclomine (BENTYL) 20 MG tablet Take 20 mg by mouth 3 times daily as needed, Historical      DULoxetine (CYMBALTA) 30 MG capsule Take 60 mg by mouth every morning, Historical      finasteride (PROSCAR) 5 MG tablet Take 1 tablet by mouth daily at 2 pm, Historical      FLOMAX 0.4 MG OR CP24 1 TABLET DAILY AFTER A MEAL, Disp-30, R-11, Normal      fluorometholone (FML LIQUIFILM) 0.1 % ophthalmic suspension Place 1 drop into both eyes as needed (dry eyes), Historical      fluticasone (FLONASE) 50 MCG/ACT nasal spray SHAKE LIQUID WELL AND INHALE 2 SPRAYS INTO AFFECTED NOSTRILS TWICE DAILY AS NEEDED FOR RHINITIS, Historical      isosorbide mononitrate (IMDUR) 60 MG 24 hr tablet Take 60 mg by mouth daily, Historical      lidocaine (XYLOCAINE) 5 % external ointment APPLY TOPICALLY TO THE AFFECTED AREA THREE TIMES DAILY AS NEEDEDHistorical      medication given by implanted intrathecal pump continuous  Drug # 1: Bupivacaine (Marcaine)  - Conc:25 mg/mL - Total Dose / 24 hours: 1025 mg    Drug # 2: Hydromorphone (Dilaudid)  - Conc:0.3 mg/mL - Total Dose / 24 hours: 12.3 mg    Drug # 3: Fentanyl (Sublimaze) - Conc: 1667 mcg/mL - Total Dose / 24  hours: 99467 mcg    Diluent: NS    May give up to 5 boluses per day of hydromorphone at 0.1mg, fentanyl at 40 mcg and bupivacaine 0.6 mg in a 24 hour period   Pump Reservoir Volume: 40 mL  Outside Clinic & Provider: [facility]   Last Refill Date:  12/04/2023  Next Refill Date: 1/16/2024  Low [place] Alarm Date: 1/20/2024  Pump : [employer]     Please consider consulting the pain team if the patient is admitted with an IT pump., Historical      MULTIVITAMINS OR TABS 1 tablet daily, R-0, Historical      naloxegol 25 MG TABS tablet Take 25 mg by mouth every morning (before breakfast), Historical      NARCAN 4 MG/0.1ML nasal spray once as needed, JUSTIN, Historical      nitroGLYcerin (NITROSTAT) 0.4 MG sublingual tablet DISSOLVE 1 TABLET UNDER THE TONGUE AS NEEDED FOR CHEST PAIN EVERY 5 MINUTES AS NEEDED AS DIRECTED, Historical      ondansetron (ZOFRAN ODT) 4 MG ODT tab DISSOLVE 1 TABLET(4 MG) ON THE TONGUE EVERY 8 HOURS AS NEEDED FOR NAUSEA OR VOMITING, Historical      pantoprazole (PROTONIX) 40 MG EC tablet Take 40 mg by mouth daily, Historical      Potassium Chloride MONICA Take 20 mEq by mouth daily, Historical      torsemide (DEMADEX) 20 MG tablet Take 1 tablet by mouth daily, Historical      traZODone (DESYREL) 50 MG tablet Take 75 mg by mouth at bedtime, Historical           STOP taking these medications       metolazone (ZAROXOLYN) 5 MG tablet Comments:   Reason for Stopping:             Allergies   Allergies   Allergen Reactions    Armodafinil Other (See Comments)    No Known Drug Allergy      Data   Most Recent 3 CBC's:  Recent Labs   Lab Test 12/04/23  1901   WBC 6.7   HGB 11.6*   MCV 89         Most Recent 3 BMP's:  Recent Labs   Lab Test  12/06/23  0642 12/05/23  0705 12/04/23  1901    139 139   POTASSIUM 4.0 4.3 3.4   CHLORIDE 102 104 101   CO2 24 24 23   BUN 13.1 16.7 15.7   CR 0.62* 0.75 0.89   ANIONGAP 10 11 15   SHAUN 8.5* 8.7* 9.1   * 104* 120*     Most Recent 2 LFT's:No lab results found.  Most Recent INR's and Anticoagulation Dosing History:  Anticoagulation Dose History          Latest Ref Rng & Units 11/22/2004 12/29/2005 1/5/2007 1/15/2007 1/17/2007 3/23/2007   Recent Dosing and Labs   INR 0.86 - 1.14 0.93  0.97  1.08  1.06  1.30  1.46  1.12      Most Recent 3 Troponin's:No lab results found.  Most Recent Cholesterol Panel:No lab results found.  Most Recent 6 Bacteria Isolates From Any Culture (See EPIC Reports for Culture Details):No lab results found.  Most Recent TSH, T4 and A1c Labs:  Recent Labs   Lab Test 12/04/23  1901   TSH 3.20     Results for orders placed or performed during the hospital encounter of 12/04/23   CT Chest (PE) Abdomen Pelvis w Contrast    Narrative    EXAM: CT CHEST PE ABDOMEN PELVIS W CONTRAST  LOCATION: [facility]  DATE: 12/4/2023    INDICATION: shortness of breath, intermittent SVT, history of left rib fractures, urinary frequency, frequent falls  COMPARISON: None.  TECHNIQUE: CT chest pulmonary angiogram and routine CT abdomen pelvis with IV contrast. Arterial phase through the chest and venous phase through the abdomen and pelvis. Multiplanar reformats and MIP reconstructions were performed. Dose reduction   techniques were used.   CONTRAST: 94mL Isovue 370    FINDINGS:  ANGIOGRAM CHEST: Pulmonary arteries are normal caliber and negative for pulmonary emboli. Thoracic aorta is not well opacified and is  indeterminate for dissection. No CT evidence of right heart strain.     LUNGS AND PLEURA: Volume loss and bronchial wall thickening in the right upper lobe.    MEDIASTINUM/AXILLAE: No lymphadenopathy. Calcified mediastinal and hilar lymph nodes consistent with prior  granulomatous infection. No thoracic aortic aneurysms.    CORONARY ARTERY CALCIFICATION: Previous intervention (stents or CABG).    HEPATOBILIARY: Normal.    PANCREAS: Normal.    SPLEEN: Punctate calcified granulomas.    ADRENAL GLANDS: Normal.    KIDNEYS/BLADDER: No significant mass, stones, or hydronephrosis. There are simple or benign cysts. No follow up is needed.    BOWEL: Diverticulosis of the colon. No acute inflammatory change. No obstruction. Embolization coils anterior to the duodenum.    LYMPH NODES: Normal.    VASCULATURE: No abdominal aortic aneurysm.    PELVIC ORGANS: Normal.    MUSCULOSKELETAL: Stimulator pack right anterior chest wall and right lower back. Minimally displaced fractures of the left eighth and ninth ribs laterally. Median sternotomy wires. Dextroconvex curvature of the lumbar spine.      Impression    IMPRESSION:  1.  No acute findings or other explanation for symptoms.    2.  Minimally displaced fractures of the left eighth and ninth ribs laterally.    3.  Volume loss and scarring in the right upper lobe, likely from prior infection or inflammation.     Most Recent 3 CBC's:  Recent Labs   Lab Test 12/04/23  1901   WBC 6.7   HGB 11.6*   MCV 89        Most Recent 3 BMP's:  Recent Labs   Lab Test 12/06/23  0642 12/05/23  0705 12/04/23  1901    139 139   POTASSIUM 4.0 4.3 3.4   CHLORIDE 102 104 101   CO2 24 24 23   BUN 13.1 16.7 15.7   CR 0.62* 0.75 0.89   ANIONGAP 10 11 15   SHAUN 8.5* 8.7* 9.1   * 104* 120*     Most Recent 2 LFT's:No lab results found.  Most Recent 3 INR's:No lab results found.

## 2023-12-22 ENCOUNTER — HOSPITAL ENCOUNTER (INPATIENT)
Facility: CLINIC | Age: 78
LOS: 3 days | Discharge: HOME | DRG: 151 | End: 2023-12-25
Attending: EMERGENCY MEDICINE | Admitting: HOSPITALIST
Payer: MEDICARE

## 2023-12-22 ENCOUNTER — HOSPITAL ENCOUNTER (EMERGENCY)
Facility: CLINIC | Age: 78
Discharge: HOME | DRG: 151 | End: 2023-12-22
Attending: EMERGENCY MEDICINE | Admitting: EMERGENCY MEDICINE
Payer: MEDICARE

## 2023-12-22 VITALS
OXYGEN SATURATION: 98 % | DIASTOLIC BLOOD PRESSURE: 123 MMHG | TEMPERATURE: 98.1 F | HEART RATE: 101 BPM | RESPIRATION RATE: 18 BRPM | SYSTOLIC BLOOD PRESSURE: 170 MMHG

## 2023-12-22 DIAGNOSIS — R79.89 ELEVATED TROPONIN: ICD-10-CM

## 2023-12-22 DIAGNOSIS — D64.9 ANEMIA, UNSPECIFIED TYPE: ICD-10-CM

## 2023-12-22 DIAGNOSIS — Z79.01 CHRONIC ANTICOAGULATION: ICD-10-CM

## 2023-12-22 DIAGNOSIS — E87.6 HYPOKALEMIA: ICD-10-CM

## 2023-12-22 DIAGNOSIS — I47.10 PAROXYSMAL SUPRAVENTRICULAR TACHYCARDIA (H): ICD-10-CM

## 2023-12-22 DIAGNOSIS — Z79.01 ANTICOAGULATED: ICD-10-CM

## 2023-12-22 DIAGNOSIS — R04.0 EPISTAXIS: ICD-10-CM

## 2023-12-22 LAB
ABO/RH(D): NORMAL
ANION GAP SERPL CALCULATED.3IONS-SCNC: 14 MMOL/L (ref 7–15)
ANTIBODY SCREEN: NEGATIVE
BASOPHILS # BLD AUTO: 0 10E3/UL (ref 0–0.2)
BASOPHILS NFR BLD AUTO: 0 %
BUN SERPL-MCNC: 8.2 MG/DL (ref 8–23)
CALCIUM SERPL-MCNC: 9.3 MG/DL (ref 8.8–10.2)
CHLORIDE SERPL-SCNC: 101 MMOL/L (ref 98–107)
CREAT SERPL-MCNC: 0.55 MG/DL (ref 0.67–1.17)
DEPRECATED HCO3 PLAS-SCNC: 24 MMOL/L (ref 22–29)
EGFRCR SERPLBLD CKD-EPI 2021: >90 ML/MIN/1.73M2
EOSINOPHIL # BLD AUTO: 0 10E3/UL (ref 0–0.7)
EOSINOPHIL NFR BLD AUTO: 0 %
ERYTHROCYTE [DISTWIDTH] IN BLOOD BY AUTOMATED COUNT: 13.5 % (ref 10–15)
GLUCOSE SERPL-MCNC: 135 MG/DL (ref 70–99)
HCT VFR BLD AUTO: 33.5 % (ref 40–53)
HGB BLD-MCNC: 11.1 G/DL (ref 13.3–17.7)
HOLD SPECIMEN: NORMAL
IMM GRANULOCYTES # BLD: 0 10E3/UL
IMM GRANULOCYTES NFR BLD: 0 %
LYMPHOCYTES # BLD AUTO: 1.8 10E3/UL (ref 0.8–5.3)
LYMPHOCYTES NFR BLD AUTO: 27 %
MAGNESIUM SERPL-MCNC: 1.8 MG/DL (ref 1.7–2.3)
MCH RBC QN AUTO: 30.2 PG (ref 26.5–33)
MCHC RBC AUTO-ENTMCNC: 33.1 G/DL (ref 31.5–36.5)
MCV RBC AUTO: 91 FL (ref 78–100)
MONOCYTES # BLD AUTO: 0.7 10E3/UL (ref 0–1.3)
MONOCYTES NFR BLD AUTO: 11 %
NEUTROPHILS # BLD AUTO: 4.2 10E3/UL (ref 1.6–8.3)
NEUTROPHILS NFR BLD AUTO: 62 %
NRBC # BLD AUTO: 0 10E3/UL
NRBC BLD AUTO-RTO: 0 /100
PLATELET # BLD AUTO: 321 10E3/UL (ref 150–450)
POTASSIUM SERPL-SCNC: 3.2 MMOL/L (ref 3.4–5.3)
RBC # BLD AUTO: 3.67 10E6/UL (ref 4.4–5.9)
SODIUM SERPL-SCNC: 139 MMOL/L (ref 135–145)
SPECIMEN EXPIRATION DATE: NORMAL
TROPONIN T SERPL HS-MCNC: 71 NG/L
TROPONIN T SERPL HS-MCNC: 73 NG/L
WBC # BLD AUTO: 6.8 10E3/UL (ref 4–11)

## 2023-12-22 PROCEDURE — 80048 BASIC METABOLIC PNL TOTAL CA: CPT | Performed by: EMERGENCY MEDICINE

## 2023-12-22 PROCEDURE — 99283 EMERGENCY DEPT VISIT LOW MDM: CPT

## 2023-12-22 PROCEDURE — 250N000013 HC RX MED GY IP 250 OP 250 PS 637: Performed by: EMERGENCY MEDICINE

## 2023-12-22 PROCEDURE — 93005 ELECTROCARDIOGRAM TRACING: CPT | Mod: 76

## 2023-12-22 PROCEDURE — 210N000002 HC R&B HEART CARE

## 2023-12-22 PROCEDURE — 83735 ASSAY OF MAGNESIUM: CPT | Performed by: HOSPITALIST

## 2023-12-22 PROCEDURE — 93005 ELECTROCARDIOGRAM TRACING: CPT

## 2023-12-22 PROCEDURE — G0378 HOSPITAL OBSERVATION PER HR: HCPCS

## 2023-12-22 PROCEDURE — 250N000013 HC RX MED GY IP 250 OP 250 PS 637: Performed by: HOSPITALIST

## 2023-12-22 PROCEDURE — 99223 1ST HOSP IP/OBS HIGH 75: CPT | Mod: AI | Performed by: HOSPITALIST

## 2023-12-22 PROCEDURE — 99285 EMERGENCY DEPT VISIT HI MDM: CPT | Mod: 25

## 2023-12-22 PROCEDURE — 36415 COLL VENOUS BLD VENIPUNCTURE: CPT | Performed by: EMERGENCY MEDICINE

## 2023-12-22 PROCEDURE — 250N000011 HC RX IP 250 OP 636: Performed by: HOSPITALIST

## 2023-12-22 PROCEDURE — 86900 BLOOD TYPING SEROLOGIC ABO: CPT | Performed by: EMERGENCY MEDICINE

## 2023-12-22 PROCEDURE — 84484 ASSAY OF TROPONIN QUANT: CPT | Performed by: EMERGENCY MEDICINE

## 2023-12-22 PROCEDURE — 85025 COMPLETE CBC W/AUTO DIFF WBC: CPT | Performed by: EMERGENCY MEDICINE

## 2023-12-22 RX ORDER — ONDANSETRON 4 MG/1
4 TABLET, ORALLY DISINTEGRATING ORAL EVERY 6 HOURS PRN
Status: DISCONTINUED | OUTPATIENT
Start: 2023-12-22 | End: 2023-12-25 | Stop reason: HOSPADM

## 2023-12-22 RX ORDER — DULOXETIN HYDROCHLORIDE 60 MG/1
60 CAPSULE, DELAYED RELEASE ORAL EVERY MORNING
Status: DISCONTINUED | OUTPATIENT
Start: 2023-12-23 | End: 2023-12-25 | Stop reason: HOSPADM

## 2023-12-22 RX ORDER — ATORVASTATIN CALCIUM 40 MG/1
40 TABLET, FILM COATED ORAL DAILY
Status: DISCONTINUED | OUTPATIENT
Start: 2023-12-23 | End: 2023-12-25 | Stop reason: HOSPADM

## 2023-12-22 RX ORDER — ONDANSETRON 2 MG/ML
4 INJECTION INTRAMUSCULAR; INTRAVENOUS EVERY 6 HOURS PRN
Status: DISCONTINUED | OUTPATIENT
Start: 2023-12-22 | End: 2023-12-25 | Stop reason: HOSPADM

## 2023-12-22 RX ORDER — TORSEMIDE 10 MG/1
10 TABLET ORAL DAILY
Status: DISCONTINUED | OUTPATIENT
Start: 2023-12-23 | End: 2023-12-25 | Stop reason: HOSPADM

## 2023-12-22 RX ORDER — AMOXICILLIN 250 MG
2 CAPSULE ORAL 2 TIMES DAILY PRN
Status: DISCONTINUED | OUTPATIENT
Start: 2023-12-22 | End: 2023-12-25 | Stop reason: HOSPADM

## 2023-12-22 RX ORDER — METOPROLOL SUCCINATE 25 MG/1
25 TABLET, EXTENDED RELEASE ORAL DAILY
Status: DISCONTINUED | OUTPATIENT
Start: 2023-12-23 | End: 2023-12-25 | Stop reason: HOSPADM

## 2023-12-22 RX ORDER — POTASSIUM CHLORIDE 1.5 G/1.58G
40 POWDER, FOR SOLUTION ORAL ONCE
Status: COMPLETED | OUTPATIENT
Start: 2023-12-22 | End: 2023-12-22

## 2023-12-22 RX ORDER — BUPROPION HYDROCHLORIDE 150 MG/1
150 TABLET ORAL EVERY MORNING
Status: DISCONTINUED | OUTPATIENT
Start: 2023-12-23 | End: 2023-12-25 | Stop reason: HOSPADM

## 2023-12-22 RX ORDER — NALOXONE HYDROCHLORIDE 0.4 MG/ML
0.2 INJECTION, SOLUTION INTRAMUSCULAR; INTRAVENOUS; SUBCUTANEOUS
Status: DISCONTINUED | OUTPATIENT
Start: 2023-12-22 | End: 2023-12-25 | Stop reason: HOSPADM

## 2023-12-22 RX ORDER — NITROGLYCERIN 0.4 MG/1
0.4 TABLET SUBLINGUAL EVERY 5 MIN PRN
Status: DISCONTINUED | OUTPATIENT
Start: 2023-12-22 | End: 2023-12-25 | Stop reason: HOSPADM

## 2023-12-22 RX ORDER — NALOXONE HYDROCHLORIDE 0.4 MG/ML
0.4 INJECTION, SOLUTION INTRAMUSCULAR; INTRAVENOUS; SUBCUTANEOUS
Status: DISCONTINUED | OUTPATIENT
Start: 2023-12-22 | End: 2023-12-25 | Stop reason: HOSPADM

## 2023-12-22 RX ORDER — BUSPIRONE HYDROCHLORIDE 7.5 MG/1
10 TABLET ORAL 3 TIMES DAILY
COMMUNITY
End: 2024-03-21 | Stop reason: DRUGHIGH

## 2023-12-22 RX ORDER — ASPIRIN 81 MG/1
324 TABLET, CHEWABLE ORAL ONCE
Status: DISCONTINUED | OUTPATIENT
Start: 2023-12-22 | End: 2023-12-22

## 2023-12-22 RX ORDER — DICYCLOMINE HCL 20 MG
20 TABLET ORAL 3 TIMES DAILY PRN
Status: DISCONTINUED | OUTPATIENT
Start: 2023-12-22 | End: 2023-12-25 | Stop reason: HOSPADM

## 2023-12-22 RX ORDER — CALCIUM CARBONATE 500 MG/1
1000 TABLET, CHEWABLE ORAL 4 TIMES DAILY PRN
Status: DISCONTINUED | OUTPATIENT
Start: 2023-12-22 | End: 2023-12-25 | Stop reason: HOSPADM

## 2023-12-22 RX ORDER — ALLOPURINOL 300 MG/1
300 TABLET ORAL DAILY
Status: DISCONTINUED | OUTPATIENT
Start: 2023-12-23 | End: 2023-12-25 | Stop reason: HOSPADM

## 2023-12-22 RX ORDER — LIDOCAINE 40 MG/G
CREAM TOPICAL
Status: DISCONTINUED | OUTPATIENT
Start: 2023-12-22 | End: 2023-12-25 | Stop reason: HOSPADM

## 2023-12-22 RX ORDER — METOPROLOL TARTRATE 1 MG/ML
2.5 INJECTION, SOLUTION INTRAVENOUS EVERY 4 HOURS PRN
Status: DISCONTINUED | OUTPATIENT
Start: 2023-12-22 | End: 2023-12-25 | Stop reason: HOSPADM

## 2023-12-22 RX ORDER — ONDANSETRON 4 MG/1
4 TABLET, ORALLY DISINTEGRATING ORAL EVERY 6 HOURS PRN
Status: DISCONTINUED | OUTPATIENT
Start: 2023-12-22 | End: 2023-12-22

## 2023-12-22 RX ORDER — PANTOPRAZOLE SODIUM 40 MG/1
40 TABLET, DELAYED RELEASE ORAL DAILY
Status: DISCONTINUED | OUTPATIENT
Start: 2023-12-23 | End: 2023-12-25 | Stop reason: HOSPADM

## 2023-12-22 RX ORDER — FENTANYL CITRATE 50 UG/ML
0.25 INJECTION, SOLUTION INTRAMUSCULAR; INTRAVENOUS EVERY 6 HOURS PRN
Status: COMPLETED | OUTPATIENT
Start: 2023-12-22 | End: 2023-12-23

## 2023-12-22 RX ORDER — FLUTICASONE PROPIONATE 50 MCG
1 SPRAY, SUSPENSION (ML) NASAL DAILY
Status: DISCONTINUED | OUTPATIENT
Start: 2023-12-23 | End: 2023-12-25 | Stop reason: HOSPADM

## 2023-12-22 RX ORDER — ISOSORBIDE MONONITRATE 60 MG/1
60 TABLET, EXTENDED RELEASE ORAL DAILY
Status: DISCONTINUED | OUTPATIENT
Start: 2023-12-23 | End: 2023-12-25 | Stop reason: HOSPADM

## 2023-12-22 RX ORDER — TAMSULOSIN HYDROCHLORIDE 0.4 MG/1
0.4 CAPSULE ORAL DAILY
Status: DISCONTINUED | OUTPATIENT
Start: 2023-12-23 | End: 2023-12-25 | Stop reason: HOSPADM

## 2023-12-22 RX ORDER — FINASTERIDE 5 MG/1
5 TABLET, FILM COATED ORAL DAILY
Status: DISCONTINUED | OUTPATIENT
Start: 2023-12-23 | End: 2023-12-25 | Stop reason: HOSPADM

## 2023-12-22 RX ORDER — METOPROLOL SUCCINATE 25 MG/1
25 TABLET, EXTENDED RELEASE ORAL ONCE
Status: COMPLETED | OUTPATIENT
Start: 2023-12-22 | End: 2023-12-22

## 2023-12-22 RX ORDER — AMOXICILLIN 250 MG
1 CAPSULE ORAL 2 TIMES DAILY PRN
Status: DISCONTINUED | OUTPATIENT
Start: 2023-12-22 | End: 2023-12-25 | Stop reason: HOSPADM

## 2023-12-22 RX ORDER — LOSARTAN POTASSIUM 50 MG/1
50 TABLET ORAL DAILY
Status: DISCONTINUED | OUTPATIENT
Start: 2023-12-23 | End: 2023-12-25 | Stop reason: HOSPADM

## 2023-12-22 RX ADMIN — AMOXICILLIN AND CLAVULANATE POTASSIUM 1 TABLET: 875; 125 TABLET, FILM COATED ORAL at 23:37

## 2023-12-22 RX ADMIN — BUSPIRONE HYDROCHLORIDE 7.5 MG: 5 TABLET ORAL at 23:36

## 2023-12-22 RX ADMIN — METOPROLOL SUCCINATE 25 MG: 25 TABLET, EXTENDED RELEASE ORAL at 19:03

## 2023-12-22 RX ADMIN — FENTANYL CITRATE 19.5 MCG: 50 INJECTION INTRAMUSCULAR; INTRAVENOUS at 19:03

## 2023-12-22 RX ADMIN — POTASSIUM CHLORIDE 40 MEQ: 1.5 POWDER, FOR SOLUTION ORAL at 17:51

## 2023-12-22 ASSESSMENT — ACTIVITIES OF DAILY LIVING (ADL)
ADLS_ACUITY_SCORE: 35

## 2023-12-22 NOTE — ED NOTES
Pt continues to not have any further bleeding of his nose. Discharge instructions given to patient. Pt verbalized understanding for self management at home and when to seek medical attention should bleeding restart and has not stopped after at least 30 minutes of pressure. Patient ambulated with cane to [facility]. Pt called [employer] for .

## 2023-12-22 NOTE — ED PROVIDER NOTES
"History   Chief Complaint:  Epistaxis    HPI   History supplemented by electronic chart review    Eren James is a 78 year old male who presents by EMS for evaluation of epistaxis.  He states that he was just at the ENT clinic, he was seen by Dr. Jakob Kathleen, he had packing done of his left-sided epistaxis, though when he was in the parking lot, he stated he started to have some more bleeding, he is talked with Dr. Kathleen again who told him to come to the ER.  The patient has had 3 prior emergency department visits since 11 AM yesterday, the first 2 in the [facility] system and then just after midnight today at [facility].  He was started on Eliquis on December 15 after diagnosis of a left leg DVT.  He reports while he was in the ambulance, he felt his heart start to go fast associated with some slight chest pain, he has had this in the past and has been diagnosed with paroxysmal SVT for which she takes metoprolol 25 mg extended release.    Independent Historian: EMS, who reports that the patient had tachycardia with heart rates up into the 170s during transport    Review of External Notes: I personally reviewed his multiple recent notes including a visit at 11 AM yesterday to an [facility] facility for left-sided epistaxis, he had liquid nitrogen cautery performed, then he was at an [facility] ED at 8 PM yesterday, see excerpt below.  He was admitted at [facility] in early December, has known SVT, recent echocardiogram showed an ejection fraction of 55 to 60% with no valvular disease.    From [facility] ED note yesterday:  \"On initial evaluation, he had no bleeding from either nare and we had a very lengthy discussion regarding how to control the bleeding at home and that he needs to follow-up with an ear nose and throat doctor if he continues to have issues.  He felt very unsatisfied with this and felt like he needed to be admitted to the hospital to be observed overnight.  He stated that he was here last week and he was very " disappointed by the fact that he was not admitted and he subsequently presented to [place] and was admitted and states that he has been talking to his attorneys to try to ameliorate this because he feels like he was note appropriately cared for.  I had to repeatedly express to him that there was no current indication for admission to the hospital given the fact that he is not having currently bleeding and that his vital signs are normal any has not had any significant bleeding or other concerning symptoms to warrant this.  He was discharged home with very specific instructions and referral to ENT to follow-up with and strict return precautions.     Upon exiting after discharge, the patient was witnessed to insert his finger into his nose and induce epistaxis and demanded to be seen again.  He was roomed again.  He was reassessed and again noted to have no active bleeding from either nare.  Nasal clip was in place and left for 15 minutes with successful control of bleeding.  He again demanded to be seen and admitted into the hospital and was overheard calling his wife stating that he will not be coming home because he needs to stay here overnight in the hospital.  He was observed for over an hour with no recurrence of epistaxis and after leaving, he demanded to be evaluated again and was noted to be having very slow dripping of blood from his right nare.  We reviewed various options of management including silver nitrate cautery, packing of the nose with TXA soaked Merisel, etc.  He stated that he did not want to have the nose packed and that he could not understand how there were not medications to fix this problem for him.  He refused to have the nose packed and chose to be discharged home but then after pursuing this option, he changed mind again and stated that he wanted to have his nose packed.  Supplies were obtained and ordered and upon returning to the room, he again changed his mind and refused to have the  "nose packed.  He again expressed his desire to be admitted to the hospital and I again emphasized the lack of indication for this.  He finally decided that he would like to have silver nitrate cautery and this was performed without incident.  He was witnessed to have no further bleeding and was discharged home with specific instructions to follow-up with ENT.\"    Medications:    No current outpatient medications on file.      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     ASD (atrial septal defect)     Asthma     BPH (benign prostatic hyperplasia)     CHF (congestive heart failure)     Cholelithiasis     Chronic neck pain     COPD (chronic obstructive pulmonary disease) (H)     Coronary artery disease     Deep vein thrombosis     Depressive disorder     Diverticulitis of colon     Esophageal reflux     Gout     Hyperlipidemia     Hypertension     Hypertrophy of prostate with urinary obstruction and other lower urinary tract symptoms (LUTS)     Kidney stone     Subdural hematoma after fall     SVT (supraventricular tachycardia)     Testicular hypofunction        Patient Active Problem List    Diagnosis Date Noted    Epistaxis 12/22/2023     Priority: Medium    Hypokalemia 12/22/2023     Priority: Medium    Elevated troponin 12/22/2023     Priority: Medium    Anticoagulated 12/22/2023     Priority: Medium    Anemia, unspecified type 12/22/2023     Priority: Medium    Paroxysmal supraventricular tachycardia 12/04/2023     Priority: Medium    Palpitations 12/04/2023     Priority: Medium    Shortness of breath 12/04/2023     Priority: Medium    Generalized weakness 12/04/2023     Priority: Medium    Hypertrophy of prostate with urinary obstruction 08/06/2005     Priority: Medium     Problem list name updated by automated process. Provider to review      Peptic ulcer 08/01/2005     Priority: Medium     Last endoscopy in 5/06;  No ulcers seen  Problem " list name updated by automated process. Provider to review      Insomnia 08/19/2004     Priority: Medium     Problem list name updated by automated process. Provider to review      Generalized hyperhidrosis 09/25/2003     Priority: Medium    Trigger finger, acquired 08/19/2003     Priority: Medium     Problem list name updated by automated process. 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 Provider to review      Esophageal reflux 08/06/2002     Priority: Medium    Cardiovascular disease 08/06/2002     Priority: Medium     Problem list name updated by automated process. Provider to review      Coronary atherosclerosis 08/06/2002     Priority: Medium     Multiple angioplasties  Last stent in 1/06  Problem list name updated by automated process. Provider to review      Essential hypertension, benign 08/06/2002     Priority: Medium    Diverticulosis of large intestine 08/06/2002     Priority: Medium     Problem list name updated by automated process. Provider to review      Aneurysm of other visceral artery 08/06/2002     Priority: Medium    Calculus of kidney 08/06/2002     Priority: Medium    Sciatica 08/06/2002     Priority: Medium    Backache 08/06/2002     Priority: Medium     Problem list name updated by automated process. Provider to review      Hyperlipidemia 08/06/2002     Priority: Medium     Problem list name updated by automated process. Provider to review      Depressive disorder, not elsewhere classified 08/06/2002     Priority: Medium        Physical Exam   Patient Vitals for the past 24 hrs:   BP Temp Temp src Pulse Resp SpO2 Height Weight   12/22/23 1705 -- -- -- 110 16 99 % -- --   12/22/23 1700 (!) 169/102 -- -- (!) 131 (!) 9 99 % -- --   12/22/23 1655 (!) 169/102 -- -- 110 16 95 % -- --   12/22/23 1600 -- -- -- (!) 146 (!) 8 -- -- --   12/22/23 1525 (!) 129/95 -- -- (!) 163 20 94 % -- --   12/22/23 1524 -- 98  " F (36.7  C) Oral -- -- -- 1.651 m (5' 5\") 77.1 kg (170 lb)      Physical Exam  General: Chronically ill but nontoxic-appearing male sitting upright in room 30  HENT: mucous membranes moist, oropharynx clear without evidence of blood  Dried blood around bilateral naris, packing in place in the left nose which is taped to the left cheek, no evidence of active bleeding  CV: Heart rate primarily in the 100s and 110s with P waves visible on the monitor and occasional ectopy, though toward the end of my evaluation he reverted to a narrow complex tachycardia with rates in the 160s,, slightly irregular rhythm, no lower extremity edema, no JVD, palpable symmetric radial pulses  Resp: normal effort, speaks in full phrases, no stridor, no cough observed  GI: abdomen soft and nontender, no guarding  MSK: no bony tenderness to chest  Skin: appropriately warm and dry, no erythema or vesicles to chest wall  Neuro: alert, clear speech, oriented  Psych: cooperative, anxious, no apparent hallucinations    Emergency Department Course   Electrocardiogram  ECG taken at 1512, ECG interpreted at 1554 by LORA Mejia MD  Narrow complex slightly irregular tachycardia, no definitive P waves seen  Rate 162 bpm. AK interval n/a. QRS duration 84. QTc 456    Electrocardiogram  ECG taken at 1603, ECG interpreted at 1621 by LORA Mejia MD  Likely sinus tachycardia, no ST elevation, nonspecific ST and T waves  Rate 108 bpm. AK interval 196. QRS duration 90. QTc 466    Laboratory:  Labs Ordered and Resulted from Time of ED Arrival to Time of ED Departure   BASIC METABOLIC PANEL - Abnormal       Result Value    Sodium 139      Potassium 3.2 (*)     Chloride 101      Carbon Dioxide (CO2) 24      Anion Gap 14      Urea Nitrogen 8.2      Creatinine 0.55 (*)     GFR Estimate >90      Calcium 9.3      Glucose 135 (*)    TROPONIN T, HIGH SENSITIVITY - Abnormal    Troponin T, High Sensitivity 73 (*)    CBC WITH PLATELETS AND DIFFERENTIAL - Abnormal    WBC " Count 6.8      RBC Count 3.67 (*)     Hemoglobin 11.1 (*)     Hematocrit 33.5 (*)     MCV 91      MCH 30.2      MCHC 33.1      RDW 13.5      Platelet Count 321      % Neutrophils 62      % Lymphocytes 27      % Monocytes 11      % Eosinophils 0      % Basophils 0      % Immature Granulocytes 0      NRBCs per 100 WBC 0      Absolute Neutrophils 4.2      Absolute Lymphocytes 1.8      Absolute Monocytes 0.7      Absolute Eosinophils 0.0      Absolute Basophils 0.0      Absolute Immature Granulocytes 0.0      Absolute NRBCs 0.0     TROPONIN T, HIGH SENSITIVITY - Abnormal    Troponin T, High Sensitivity 71 (*)    TYPE AND SCREEN, ADULT    ABO/RH(D) A NEG      Antibody Screen Negative      SPECIMEN EXPIRATION DATE [id number]     ABO/RH TYPE AND SCREEN        Emergency Department Course:  Reviewed:  I reviewed nursing notes, vitals, and past medical history    Assessments/Consultations/Discussion of Management or Tests :  I obtained history and examined the patient as noted above.   ED Course as of 12/22/23 2106   Fri Dec 22, 2023   1602 I spoke with Dr. Kathleen, ENT.   1621 Rn notified me patient in sinus rhythm, sleeping, HR much improved.   1630 I spoke with Dr. Arthur Poe, Neuro IR, he is available for embolization if patient worsens, this could be done at [facility].   1753 I spoke with Dr. Bateman, Hospitalist, who accepts care.     Interventions:  Medications   fentaNYL (PF) (SUBLIMAZE) injection 19.5 mcg (19.5 mcg Intravenous $Given 12/22/23 1903)   potassium chloride (KLOR-CON) Packet 40 mEq (40 mEq Oral $Given 12/22/23 1751)   metoprolol succinate ER (TOPROL XL) 24 hr tablet 25 mg (25 mg Oral $Given 12/22/23 1903)      Social Determinants of Health affecting care:   Healthcare Access/Compliance    Disposition:  Admit to Dr. Bateman    Impression & Plan    Medical Decision Making:  Regarding his epistaxis, this is now his fifth medical evaluation in less than 48 hours for the same issue.  No signs of  overt bleeding at this time, I note that he just came from the otolaryngologist who identified a posterior bleed and placed some dissolvable packing.  I spoke with the referring otolaryngologist, who recommended that the next step, should he clinically worsen with recurrent significant bleeding, should be attempt at formal embolization with interventional radiology.  For this reason, I spoke with the on-call neurointerventional list, who is willing and able to perform this procedure if the patient does deteriorate, they are available at any hour including over the weekend here at [facility].  Neither the otolaryngologist nor the interventional radiologist felt that CT angiography would be of clinical benefit at this time.  In the absence of overt bleeding, I did not feel that further manipulation of his already placed packing was indicated.  I note that he has had paroxysmal tachycardia, he has a history of the same.  He has alternated between narrow complex irregular tachycardia and a sinus rhythm with minimally tachycardic rate.  Troponin sent before I evaluated him, this is somewhat elevated though is flat.  He is already anticoagulated, I do not think that heparin is indicated in light of his recent epistaxis.  He is found to have mild hypokalemia.  He requires hospitalization for close monitoring of bleeding, consideration of his anticoagulant use, and further care.  He was notified of these findings and plan of care and agrees with them.  He was admitted to a monitored bed in the care of the hospital service.    Diagnosis:    ICD-10-CM    1. Epistaxis  R04.0       2. Anticoagulated  Z79.01       3. Paroxysmal supraventricular tachycardia  I47.10       4. Anemia, unspecified type  D64.9     stable      5. Hypokalemia  E87.6       6. Elevated troponin  R79.89          12/22/2023   MD Jackie Patrick Jeffrey Alan, MD  12/22/23 0852

## 2023-12-22 NOTE — H&P
[facility]    History and Physical - Hospitalist Service       Date of Admission:  12/22/2023    Assessment & Plan      Eren James is a 78 year old male admitted on 12/22/2023.       Epistaxis  Posterior left nares bleeding status post packing in the ENT clinic with Dr. Kathleen  C/b recent start of AC for the DVT  Possible self injurous behavior, with insertion of his finger in his nose at an [facility] facility  Now returns with recurrent epistaxis  ED discussed with ENT again and if recurrent bleeding would consult neuro IR for embolization.  ED discussed with neuro IR Dr. Lubin, at this point would monitor for now  Plan  - admit to innpatient  - q8 hour hemoglobins  - hold his eliquis and aspirin  - ENT consult  - if severe bleeding would consult neuro IR  - type and screen  - continue augmentin      SVT with recurrence causing troponin elevation  hypokalemia  Recently seen at this hospital for this and had his metoprolol increased  Did occur in the ED but repeat EKG with improvement to NSR with HR in the 100s  Plan  - continue metoprolol  - add IV metoprolol  - electrolyte monitoring and replacement of k and mg  - follow-up on the troponins, but ACS risk is low      Recent diagnosis of DVT  acute DVT in the left distal femoral, popliteal, posterior tibial, peroneal, and gastrocnemius veins noted on US through [facility] on 12/15 and was started on eliquis  Plan  - hold eliquis given the nose bleed  - will ask hematology to weigh if a filter is needed      Chronic pain syndrome: Controlled  *Patient with implanted pain pump. Follows with [facility] pain clinic and was actually seen earlier 12/4 for pump refill. Primarily pain is in low back, but also with complaints of neuropathy of lower extremities related to his spinal stenosis.  * patient requesting IV opiate medications and has left his bolus device at home, similar to his last admission  * he had received 21 mcg q2 hours last admission for  "this same thing  * he was encouraged to bring his home bolus device  Plan  - will provide 0.25 mcg/kg fentanyl up to 4x for 4 dosages for the pateint  - advised to bring his bolus device from home, per pharmacy they would allow this  - monitor for seeing behavior given the concern for finger in the nose through [facility] and requesting IV pain medications here. Ideally he brings his pump from home  - continue the wellbutrin, duloxetine      BPH  - continue flomax and finasteride      ARLEN  Plan  - resume inspire device      Gout  - allopurinol      HTN:  - continue losartan, metoprolol, and torsemide      GERD  - continue PPI        Diet:  regular  DVT Prophylaxis: Pneumatic Compression Devices  Alexander Catheter: Not present  Lines: None     Cardiac Monitoring: None  Code Status:  full code    Clinically Significant Risk Factors Present on Admission        # Hypokalemia: Lowest K = 3.2 mmol/L in last 2 days, will replace as needed        # Drug Induced Coagulation Defect: home medication list includes an anticoagulant medication  # Drug Induced Platelet Defect: home medication list includes an antiplatelet medication   # Hypertension: Noted on problem list      # Overweight: Estimated body mass index is 28.29 kg/m  as calculated from the following:    Height as of this encounter: 1.651 m (5' 5\").    Weight as of this encounter: 77.1 kg (170 lb).              Disposition Plan      Expected Discharge Date: 12/24/2023                  Cornel Bateman DO  Hospitalist Service  [facility]  Securely message with [employer] (more info)  Text page via [employer] Paging/Directory     ______________________________________________________________________    Chief Complaint   Bloody nose    History is obtained from the patient    History of Present Illness   Eren James is a 78 year old male with past medical history of SVT, DVT recently diagnosed, chronic pain with intrathecal pump, and obstructive sleep " tomas who presents with bloody nose after referral from Dr. Kathleen ENT.  The patient was recently diagnosed with a DVT and started on Eliquis on December 15 after he developed swelling in his left leg.  He has had 3 emergency department visits starting yesterday for bleeding that developed primarily through the [facility] system..  He was treated first with nitrogen cautery and then discharged.  He was told to follow-up with the ENT as an outpatient.  He further presented with more bleeding and when told he is not admitted there is potentially an episode where he put his finger and is noted to induce bleeding.  In this context I reevaluated him and did end up packing his nose and then using silver nitrate cautery and discharging him again.    Today he saw the ears nose and throat doctor who provided posterior packing for what was considered a left-sided posterior nosebleed.  He developed oozing upon returning to the car and went back into see the ENT doctor however was referred to the emergency department for further evaluation.    The emergency provider did speak with ENT who advised if the patient has significant bleeding that neurointerventional radiology would be consulted for potential embolization of the nasal arteries.  Dr. Mejia did speak with neurointerventional radiology with the plan to watch given that the bleeding is very mild.    To further complicate things while in the emergency department he was found to have recurrence of his supraventricular tachycardia with heart rates of 160.  This self terminated.    The patient reports that his last Eliquis dose was this morning.  His big concern is that he left his bolus pump for his intrathecal pain pump at home and is requesting dosages of IV fentanyl that he received during his last admission here for SVT.      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     See [facility]    ASD (atrial septal  defect)     Asthma     BPH (benign prostatic hyperplasia)     CHF (congestive heart failure)     Cholelithiasis     Chronic neck pain     COPD (chronic obstructive pulmonary disease) (H)     Coronary artery disease     Previous coronary angioplasties    Deep vein thrombosis     Depressive disorder     Diverticulitis of colon     Esophageal reflux     Gout     Hyperlipidemia     Hypertension     Hypertrophy of prostate with urinary obstruction and other lower urinary tract symptoms (LUTS)     Kidney stone     Subdural hematoma after fall     SVT (supraventricular tachycardia)     Testicular hypofunction        Past Surgical History   Past Surgical History:   Procedure Laterality Date    Abdominal artery aneurysm ligation (not aortic aneurysm)      Colon surgery for diverticulitis      COLONOSCOPY      Coronary angiogram with angioplasty      Coronary angiogram with coronary stents placed      CYSTOSCOPY, TRANSURETHRAL RESECTION (TUR) PROSTATE, COMBINED      PARATHYROIDECTOMY      TONSILLECTOMY, ADENOIDECTOMY, COMBINED         Prior to Admission Medications   Prior to Admission Medications   Prescriptions Last Dose Informant Patient Reported? Taking?   B Complex CAPS  Self Yes No   Sig: Take 1 tablet by mouth daily   DULoxetine (CYMBALTA) 30 MG capsule  Self Yes No   Sig: Take 60 mg by mouth every morning   FLOMAX 0.4 MG OR CP24  Self No No   Si TABLET DAILY AFTER A MEAL   MULTIVITAMINS OR TABS  Self Yes No   Si tablet daily   NARCAN 4 MG/0.1ML nasal spray  Self Yes No   Sig: once as needed   Potassium Chloride MONICA  Self Yes No   Sig: Take 20 mEq by mouth daily   acyclovir (ZOVIRAX) 200 MG capsule  Self Yes No   Sig: TAKE 2 CAPSULES BY MOUTH TWICE DAILY FOR 5 DAYS AS NEEDED FOR COLD SORES   allopurinol (ZYLOPRIM) 300 MG tablet  Self Yes No   Sig: TAKE 1 TABLET(300 MG) BY MOUTH EVERY DAY Strength: 300 mg   apixaban ANTICOAGULANT (ELIQUIS) 5 MG tablet   Yes No   Sig: Take 5 mg by mouth 2 times daily    aspirin 81 MG EC tablet  Self Yes No   Sig: Take 81 mg by mouth daily   atorvastatin (LIPITOR) 40 MG tablet  Self Yes No   Sig: Take 40 mg by mouth daily   buPROPion (WELLBUTRIN XL) 150 MG 24 hr tablet  Self Yes No   Sig: Take 150 mg by mouth every morning   chlorhexidine (PERIDEX) 0.12 % solution  Self Yes No   Sig: SWISH AND SPIT 15 ML BY MOUTH TWICE DAILY   dicyclomine (BENTYL) 20 MG tablet  Self Yes No   Sig: Take 20 mg by mouth 3 times daily as needed   finasteride (PROSCAR) 5 MG tablet  Self Yes No   Sig: Take 1 tablet by mouth daily at 2 pm   fluorometholone (FML LIQUIFILM) 0.1 % ophthalmic suspension  Self Yes No   Sig: Place 1 drop into both eyes as needed (dry eyes)   fluticasone (FLONASE) 50 MCG/ACT nasal spray  Self Yes No   Sig: SHAKE LIQUID WELL AND INHALE 2 SPRAYS INTO AFFECTED NOSTRILS TWICE DAILY AS NEEDED FOR RHINITIS   isosorbide mononitrate (IMDUR) 60 MG 24 hr tablet  Self Yes No   Sig: Take 60 mg by mouth daily   lidocaine (XYLOCAINE) 5 % external ointment  Self Yes No   Sig: APPLY TOPICALLY TO THE AFFECTED AREA THREE TIMES DAILY AS NEEDED   losartan (COZAAR) 100 MG tablet   Yes No   Sig: Take 0.5 tablets (50 mg) by mouth daily   medication given by implanted intrathecal pump   Yes No   Sig: continuous Drug # 1: Bupivacaine (Marcaine)  - Conc:25 mg/mL - Total Dose / 24 hours: 1025 mg    Drug # 2: Hydromorphone (Dilaudid)  - Conc:0.3 mg/mL - Total Dose / 24 hours: 12.3 mg    Drug # 3: Fentanyl (Sublimaze) - Conc: 1667 mcg/mL - Total Dose / 24 hours: 76732 mcg    Diluent: NS    May give up to 5 boluses per day of hydromorphone at 0.1mg, fentanyl at 40 mcg and bupivacaine 0.6 mg in a 24 hour period   Pump Reservoir Volume: 40 mL  Outside Clinic & Provider: [facility]   Last Refill Date: 12/04/2023  Next Refill Date: 1/16/2024  Low [place] Alarm Date: 1/20/2024  Pump : Tenrox     Please consider consulting the pain team if the patient is admitted with an IT pump.    metoprolol succinate ER (TOPROL XL) 25 MG 24 hr tablet   No No   Sig: Take 1 tablet (25 mg) by mouth daily   naloxegol 25 MG TABS tablet  Self Yes No   Sig: Take 25 mg by mouth every morning (before breakfast)   nitroGLYcerin (NITROSTAT) 0.4 MG sublingual tablet  Self Yes No   Sig: DISSOLVE 1 TABLET UNDER THE TONGUE AS NEEDED FOR CHEST PAIN EVERY 5 MINUTES AS NEEDED AS DIRECTED   ondansetron (ZOFRAN ODT) 4 MG ODT tab  Self Yes No   Sig: DISSOLVE 1 TABLET(4 MG) ON THE TONGUE EVERY 8 HOURS AS NEEDED FOR NAUSEA OR VOMITING   pantoprazole (PROTONIX) 40 MG EC tablet  Self Yes No   Sig: Take 40 mg by mouth daily   torsemide (DEMADEX) 20 MG tablet  Self Yes No   Sig: Take 1 tablet by mouth daily   traZODone (DESYREL) 50 MG tablet  Self Yes No   Sig: Take 75 mg by mouth at bedtime      Facility-Administered Medications: None        Review of Systems    The 10 point Review of Systems is negative other than noted in the HPI or here.     Social History   I have reviewed this patient's social history and updated it with pertinent information if needed.  Social History     Tobacco Use    Smoking status: Never    Smokeless tobacco: Never   Substance Use Topics    Alcohol use: No    Drug use: No         Family History   I have reviewed this patient's family history and updated it with pertinent information if needed.  Family History   Problem Relation Age of Onset    Cardiovascular Mother         living. hx bypass.    Cardiovascular Father         .cardiomegaly.    Diabetes Sister                  Allergies   Allergies   Allergen Reactions    Armodafinil Other (See Comments)        Physical Exam   Vital Signs: Temp: 98  F (36.7  C) Temp src: Oral BP: (!) 169/102 Pulse: 110   Resp: 16 SpO2: 99 % O2 Device: None (Room air)    Weight: 170 lbs 0 oz    Constitutional: Cooperative.   HENT:  Mouth/Throat: Excoriation to left naris without active bleeding.  Cardiovascular: Normal rate, regular rhythm and normal heart  sounds.  No murmur.  Pulmonary/Chest: Effort normal.  Neurological: Alert. Oriented x3  Skin: Skin is warm and dry.   Psychiatric: Normal mood and affect.    Medical Decision Making       78 MINUTES SPENT BY ME on the date of service doing chart review, history, exam, documentation & further activities per the note.      Data     I have personally reviewed the following data over the past 24 hrs:    6.8  \   11.1 (L)   / 321     139 101 8.2 /  135 (H)   3.2 (L) 24 0.55 (L) \     Trop: 71 (H) BNP: N/A       Imaging results reviewed over the past 24 hrs:   No results found for this or any previous visit (from the past 24 hour(s)).

## 2023-12-22 NOTE — ED TRIAGE NOTES
From home, BIBA. Started on eliquis a week ago for clots in his leg. Been having a nose bleed for the last 2-3 days. ENT placed a wad in his left nostril today. Has A. Fib RVR into the 170s in the rig. Started feeling chest pressure while in rig.

## 2023-12-22 NOTE — ED PROVIDER NOTES
History     Chief Complaint:  Epistaxis     The history is provided by the patient.      Eren James is a 78 year old male on Eliquis who presents to the ED for evaluation of epistaxis. Eren reports he was recently seen and prescribed Eliquis for lower leg DVT. He endorses intermittent nose bleeds over the last 2 days. He presented to [facility] ED and bleeding was controlled with cauterization, but the patient picked his nose after discharge and was cauterized again.  He declined nasal packing.  He states he became anxious due to the recurrent nose bleeds, prompting his ED visit tonight.  He is not currently bleeding at this time.    Independent Historian:   None - Patient Only    Review of External Notes:   I reviewed his 2 recent ED visits for epistaxis.    Medications:    apixaban ANTICOAGULANT (ELIQUIS) 5 MG tablet  acyclovir (ZOVIRAX) 200 MG capsule  allopurinol (ZYLOPRIM) 300 MG tablet  aspirin 81 MG EC tablet  atorvastatin (LIPITOR) 40 MG tablet  B Complex CAPS  buPROPion (WELLBUTRIN XL) 150 MG 24 hr tablet  chlorhexidine (PERIDEX) 0.12 % solution  dicyclomine (BENTYL) 20 MG tablet  DULoxetine (CYMBALTA) 30 MG capsule  finasteride (PROSCAR) 5 MG tablet  FLOMAX 0.4 MG OR CP24  fluorometholone (FML LIQUIFILM) 0.1 % ophthalmic suspension  fluticasone (FLONASE) 50 MCG/ACT nasal spray  isosorbide mononitrate (IMDUR) 60 MG 24 hr tablet  lidocaine (XYLOCAINE) 5 % external ointment  losartan (COZAAR) 100 MG tablet  medication given by implanted intrathecal pump  metoprolol succinate ER (TOPROL XL) 25 MG 24 hr tablet  MULTIVITAMINS OR TABS  naloxegol 25 MG TABS tablet  NARCAN 4 MG/0.1ML nasal spray  nitroGLYcerin (NITROSTAT) 0.4 MG sublingual tablet  ondansetron (ZOFRAN ODT) 4 MG ODT tab  pantoprazole (PROTONIX) 40 MG EC tablet  Potassium Chloride MONICA  torsemide (DEMADEX) 20 MG tablet  traZODone (DESYREL) 50 MG tablet    Past Medical History:    Past Medical History:   Diagnosis Date    Aneurysm of visceral  artery, 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     See [facility]    ASD (atrial septal defect)     Asthma     BPH (benign prostatic hyperplasia)     CHF (congestive heart failure)     Cholelithiasis     Chronic neck pain     COPD (chronic obstructive pulmonary disease) (H)     Coronary artery disease     Previous coronary angioplasties    Deep vein thrombosis     Depressive disorder     Diverticulitis of colon     Esophageal reflux     Gout     Hyperlipidemia     Hypertension     Hypertrophy of prostate with urinary obstruction and other lower urinary tract symptoms (LUTS)     Kidney stone     Subdural hematoma after fall     SVT (supraventricular tachycardia)     Testicular hypofunction      Past Surgical History:    PTCA x3 with stent placement  Abdominal artery aneurysm ligation  Polyp removal from colon  Rectal polyp removal x2  Parathyroid removed  CABG  Basal cell removal, nose  Green light surgery  TURP  Carpal tunnel release, right  Temporary colostomy  Tonsillectomy  Adenoidectomy    Physical Exam   Patient Vitals for the past 24 hrs:   BP Temp Temp src Pulse SpO2   12/22/23 0130  162/101 -- -- 80 --   12/22/23 0115  165/88 -- -- 78 --   12/22/23 0100  165/88 -- -- 87 --   12/22/23 0045  167/92 -- -- 84 --   12/22/23 0038 -- -- -- -- 98 %   12/22/23 0037 -- -- -- -- 96 %   12/22/23 0022 -- -- -- -- 94 %   12/22/23 0017  149/86 -- -- 82 92 %   12/22/23 0013 -- -- -- -- 97 %   12/22/23 0011 -- 98.1  F (36.7  C) Oral -- --   12/22/23 0009  172/96 -- -- 87 98 %      Physical Exam  Nursing note and vitals reviewed.  Constitutional: Cooperative.   HENT:  Mouth/Throat: Excoriation to left naris without bleeding.  Cardiovascular: Normal rate, regular rhythm and normal heart sounds.  No murmur.  Pulmonary/Chest: Effort normal.  Neurological: Alert. Oriented x3  Skin: Skin is warm and dry.   Psychiatric: Normal mood and affect.    Emergency Department Course     Interventions:  Medications - No data to display     Assessments:  0149 I obtained  history and examined the patient as noted above.     Social Determinants of Health affecting care:   None    Disposition:  The patient was discharged to home.     Impression & Plan      Medical Decision Making:  Is an 78-year-old gentleman who presents with intermittent nosebleeds.  No active bleeding at this time.  He was recently started on Eliquis for anticoagulation due to a DVT.  It seems like he is very nervous about this medication and his new diagnosis.  I reviewed his documentation from his recent ER visit earlier today.  It sounds like he was requesting admission for observation.  I would agree that he does not meet criteria for hospital observation especially since there is no active bleeding.  I would not recauterized his nose.  No indication for nasal packing.  He was counseled appropriately on management strategy if he has a rebleed and knows we are always happy to see him if he cannot control it at home.  I feel he is next candidate for outpatient ENT and primary care follow-up    Diagnosis:    ICD-10-CM    1. Epistaxis  R04.0       2. Chronic anticoagulation  Z79.01         Scribe Disclosure:  CHRISSIE, Tanesha Gates, am serving as a scribe at 2:11 AM on 12/22/2023 to document services personally performed by Jakob Araya MD based on my observations and the provider's statements to me.   12/22/2023   Jakob Araya MD Amdahl, John, MD  12/22/23 1386

## 2023-12-22 NOTE — ED NOTES
Bed: ED30  Expected date:   Expected time:   Means of arrival:   Comments:  [facility] - 540 - 78M nosebleed atrial fib eta 1500

## 2023-12-22 NOTE — ED NOTES
[facility]  ED Nurse Handoff Report    ED Chief complaint: Epistaxis and Chest Pain      ED Diagnosis:   Final diagnoses:   Epistaxis   Anticoagulated   Paroxysmal supraventricular tachycardia       Code Status: TBD    Allergies:   Allergies   Allergen Reactions    Armodafinil Other (See Comments)       Patient Story: From home, BIBA. Started on eliquis a week ago for clots in his leg. Been having a nose bleed for the last 2-3 days. ENT placed a wad in his left nostril today. Has A. Fib RVR into the 170s in the rig. Started feeling chest pressure while in rig. Patient has been to 4 Emergency rooms from yesterday to today. Patient has recently started asking for fentanyl boluses for his chronic back pain.   Focused Assessment:  patient has bloody nostrils and a plug placed by ENT in the left nostril. Slightly low potassium, but that is not too concerning.     Treatments and/or interventions provided: nothing  Patient's response to treatments and/or interventions: patient goes in and out of sinus tachy and A. Fib RVR. He is mostly staying in sinus tachy around 110.     To be done/followed up on inpatient unit:  ENT consult, pain management, observation.    Does this patient have any cognitive concerns?:  none    Activity level - Baseline/Home:  Independent  Activity Level - Current:   Independent    Patient's Preferred language: English   Needed?: No    Isolation: None  Infection: Not Applicable  Patient tested for COVID 19 prior to admission: NO  Bariatric?: No    Vital Signs:   Vitals:    12/22/23 1600 12/22/23 1655 12/22/23 1700 12/22/23 1705   BP:  (!) 169/102 (!) 169/102    Pulse: (!) 146 110 (!) 131 110   Resp: (!) 8 16 (!) 9 16   Temp:       TempSrc:       SpO2:  95% 99% 99%   Weight:       Height:           Cardiac Rhythm:     Was the PSS-3 completed:   Yes  What interventions are required if any?  none    For the majority of the shift this patient's behavior was Green.   Behavioral  interventions performed were none.    ED NURSE PHONE NUMBER: [id number]

## 2023-12-22 NOTE — ED TRIAGE NOTES
Arrived via EMS from home with wife. Has had intermittent nose bleeds in the past 24 hours. He has been to [place] twice for cauterizing. When EMS arrived, patient had nose clip on and nose was not bleeding. Towel patient was using had a couple clots seen, but very minimal blood per EMS.  Hx on blood thinners- eliquis- recent lower leg DVT.    Nose currently not bleeding, and patient is resting comfortably.     Triage Assessment (Adult)       Row Name 12/22/23 0018          Triage Assessment    Airway WDL WDL        Respiratory WDL    Respiratory WDL WDL        Skin Circulation/Temperature WDL    Skin Circulation/Temperature WDL WDL        Cardiac WDL    Cardiac WDL WDL        Peripheral/Neurovascular WDL    Peripheral Neurovascular WDL WDL        Cognitive/Neuro/Behavioral WDL    Cognitive/Neuro/Behavioral WDL WDL

## 2023-12-23 ENCOUNTER — APPOINTMENT (OUTPATIENT)
Dept: ULTRASOUND IMAGING | Facility: CLINIC | Age: 78
DRG: 151 | End: 2023-12-23
Attending: INTERNAL MEDICINE
Payer: MEDICARE

## 2023-12-23 LAB
ALBUMIN SERPL BCG-MCNC: 3.3 G/DL (ref 3.5–5.2)
ALP SERPL-CCNC: 133 U/L (ref 40–150)
ALT SERPL W P-5'-P-CCNC: 13 U/L (ref 0–70)
ANION GAP SERPL CALCULATED.3IONS-SCNC: 8 MMOL/L (ref 7–15)
AST SERPL W P-5'-P-CCNC: 22 U/L (ref 0–45)
ATRIAL RATE - MUSE: 108 BPM
ATRIAL RATE - MUSE: 159 BPM
BASOPHILS # BLD AUTO: 0 10E3/UL (ref 0–0.2)
BASOPHILS NFR BLD AUTO: 0 %
BILIRUB SERPL-MCNC: 0.9 MG/DL
BUN SERPL-MCNC: 8.3 MG/DL (ref 8–23)
CALCIUM SERPL-MCNC: 8.8 MG/DL (ref 8.8–10.2)
CHLORIDE SERPL-SCNC: 103 MMOL/L (ref 98–107)
CREAT SERPL-MCNC: 0.59 MG/DL (ref 0.67–1.17)
DEPRECATED HCO3 PLAS-SCNC: 27 MMOL/L (ref 22–29)
DIASTOLIC BLOOD PRESSURE - MUSE: NORMAL MMHG
DIASTOLIC BLOOD PRESSURE - MUSE: NORMAL MMHG
EGFRCR SERPLBLD CKD-EPI 2021: >90 ML/MIN/1.73M2
EOSINOPHIL # BLD AUTO: 0.1 10E3/UL (ref 0–0.7)
EOSINOPHIL NFR BLD AUTO: 1 %
ERYTHROCYTE [DISTWIDTH] IN BLOOD BY AUTOMATED COUNT: 13.7 % (ref 10–15)
GLUCOSE SERPL-MCNC: 95 MG/DL (ref 70–99)
HCT VFR BLD AUTO: 30.9 % (ref 40–53)
HGB BLD-MCNC: 10.1 G/DL (ref 13.3–17.7)
HGB BLD-MCNC: 10.2 G/DL (ref 13.3–17.7)
HGB BLD-MCNC: 10.8 G/DL (ref 13.3–17.7)
IMM GRANULOCYTES # BLD: 0 10E3/UL
IMM GRANULOCYTES NFR BLD: 0 %
INTERPRETATION ECG - MUSE: NORMAL
INTERPRETATION ECG - MUSE: NORMAL
LYMPHOCYTES # BLD AUTO: 1.8 10E3/UL (ref 0.8–5.3)
LYMPHOCYTES NFR BLD AUTO: 32 %
MCH RBC QN AUTO: 30.6 PG (ref 26.5–33)
MCHC RBC AUTO-ENTMCNC: 33 G/DL (ref 31.5–36.5)
MCV RBC AUTO: 93 FL (ref 78–100)
MONOCYTES # BLD AUTO: 0.6 10E3/UL (ref 0–1.3)
MONOCYTES NFR BLD AUTO: 11 %
NEUTROPHILS # BLD AUTO: 3.1 10E3/UL (ref 1.6–8.3)
NEUTROPHILS NFR BLD AUTO: 56 %
NRBC # BLD AUTO: 0 10E3/UL
NRBC BLD AUTO-RTO: 0 /100
P AXIS - MUSE: 65 DEGREES
P AXIS - MUSE: NORMAL DEGREES
PLATELET # BLD AUTO: 282 10E3/UL (ref 150–450)
POTASSIUM SERPL-SCNC: 3.5 MMOL/L (ref 3.4–5.3)
POTASSIUM SERPL-SCNC: 3.5 MMOL/L (ref 3.4–5.3)
PR INTERVAL - MUSE: 196 MS
PR INTERVAL - MUSE: NORMAL MS
PROT SERPL-MCNC: 5.6 G/DL (ref 6.4–8.3)
QRS DURATION - MUSE: 84 MS
QRS DURATION - MUSE: 90 MS
QT - MUSE: 278 MS
QT - MUSE: 348 MS
QTC - MUSE: 456 MS
QTC - MUSE: 466 MS
R AXIS - MUSE: 40 DEGREES
R AXIS - MUSE: 43 DEGREES
RBC # BLD AUTO: 3.33 10E6/UL (ref 4.4–5.9)
SODIUM SERPL-SCNC: 138 MMOL/L (ref 135–145)
SYSTOLIC BLOOD PRESSURE - MUSE: NORMAL MMHG
SYSTOLIC BLOOD PRESSURE - MUSE: NORMAL MMHG
T AXIS - MUSE: 27 DEGREES
T AXIS - MUSE: 77 DEGREES
TROPONIN T SERPL HS-MCNC: 82 NG/L
TROPONIN T SERPL HS-MCNC: 87 NG/L
VENTRICULAR RATE- MUSE: 108 BPM
VENTRICULAR RATE- MUSE: 162 BPM
WBC # BLD AUTO: 5.5 10E3/UL (ref 4–11)

## 2023-12-23 PROCEDURE — 84484 ASSAY OF TROPONIN QUANT: CPT | Performed by: HOSPITALIST

## 2023-12-23 PROCEDURE — 99223 1ST HOSP IP/OBS HIGH 75: CPT | Performed by: INTERNAL MEDICINE

## 2023-12-23 PROCEDURE — 85025 COMPLETE CBC W/AUTO DIFF WBC: CPT | Performed by: HOSPITALIST

## 2023-12-23 PROCEDURE — 36415 COLL VENOUS BLD VENIPUNCTURE: CPT | Performed by: HOSPITALIST

## 2023-12-23 PROCEDURE — 250N000013 HC RX MED GY IP 250 OP 250 PS 637: Performed by: HOSPITALIST

## 2023-12-23 PROCEDURE — 250N000011 HC RX IP 250 OP 636: Performed by: HOSPITALIST

## 2023-12-23 PROCEDURE — 120N000001 HC R&B MED SURG/OB

## 2023-12-23 PROCEDURE — 250N000013 HC RX MED GY IP 250 OP 250 PS 637: Performed by: INTERNAL MEDICINE

## 2023-12-23 PROCEDURE — 80053 COMPREHEN METABOLIC PANEL: CPT | Performed by: HOSPITALIST

## 2023-12-23 PROCEDURE — 99232 SBSQ HOSP IP/OBS MODERATE 35: CPT | Performed by: INTERNAL MEDICINE

## 2023-12-23 PROCEDURE — 93971 EXTREMITY STUDY: CPT | Mod: LT

## 2023-12-23 PROCEDURE — 85018 HEMOGLOBIN: CPT | Performed by: HOSPITALIST

## 2023-12-23 RX ORDER — POTASSIUM CHLORIDE 1500 MG/1
40 TABLET, EXTENDED RELEASE ORAL ONCE
Status: DISCONTINUED | OUTPATIENT
Start: 2023-12-23 | End: 2023-12-23

## 2023-12-23 RX ORDER — FENTANYL CITRATE 50 UG/ML
0.25 INJECTION, SOLUTION INTRAMUSCULAR; INTRAVENOUS EVERY 6 HOURS PRN
Status: COMPLETED | OUTPATIENT
Start: 2023-12-23 | End: 2023-12-25

## 2023-12-23 RX ADMIN — AMOXICILLIN AND CLAVULANATE POTASSIUM 1 TABLET: 875; 125 TABLET, FILM COATED ORAL at 21:12

## 2023-12-23 RX ADMIN — FINASTERIDE 5 MG: 5 TABLET, FILM COATED ORAL at 09:41

## 2023-12-23 RX ADMIN — BUPROPION HYDROCHLORIDE 150 MG: 150 TABLET, FILM COATED, EXTENDED RELEASE ORAL at 09:42

## 2023-12-23 RX ADMIN — AMOXICILLIN AND CLAVULANATE POTASSIUM 1 TABLET: 875; 125 TABLET, FILM COATED ORAL at 09:40

## 2023-12-23 RX ADMIN — ATORVASTATIN CALCIUM 40 MG: 40 TABLET, FILM COATED ORAL at 09:41

## 2023-12-23 RX ADMIN — DULOXETINE HYDROCHLORIDE 60 MG: 60 CAPSULE, DELAYED RELEASE ORAL at 09:40

## 2023-12-23 RX ADMIN — PANTOPRAZOLE SODIUM 40 MG: 40 TABLET, DELAYED RELEASE ORAL at 09:41

## 2023-12-23 RX ADMIN — LOSARTAN POTASSIUM 50 MG: 50 TABLET, FILM COATED ORAL at 09:42

## 2023-12-23 RX ADMIN — FENTANYL CITRATE 19.5 MCG: 50 INJECTION INTRAMUSCULAR; INTRAVENOUS at 02:12

## 2023-12-23 RX ADMIN — FENTANYL CITRATE 19.5 MCG: 50 INJECTION INTRAMUSCULAR; INTRAVENOUS at 11:23

## 2023-12-23 RX ADMIN — TORSEMIDE 10 MG: 10 TABLET ORAL at 09:41

## 2023-12-23 RX ADMIN — TRAZODONE HYDROCHLORIDE 75 MG: 50 TABLET ORAL at 23:59

## 2023-12-23 RX ADMIN — ALLOPURINOL 300 MG: 300 TABLET ORAL at 09:40

## 2023-12-23 RX ADMIN — APIXABAN 2.5 MG: 2.5 TABLET, FILM COATED ORAL at 21:12

## 2023-12-23 RX ADMIN — BUSPIRONE HYDROCHLORIDE 7.5 MG: 5 TABLET ORAL at 21:12

## 2023-12-23 RX ADMIN — FENTANYL CITRATE 19.5 MCG: 50 INJECTION INTRAMUSCULAR; INTRAVENOUS at 17:37

## 2023-12-23 RX ADMIN — ISOSORBIDE MONONITRATE 60 MG: 60 TABLET, EXTENDED RELEASE ORAL at 09:40

## 2023-12-23 RX ADMIN — METOPROLOL SUCCINATE 25 MG: 25 TABLET, EXTENDED RELEASE ORAL at 09:41

## 2023-12-23 RX ADMIN — TAMSULOSIN HYDROCHLORIDE 0.4 MG: 0.4 CAPSULE ORAL at 09:41

## 2023-12-23 RX ADMIN — BUSPIRONE HYDROCHLORIDE 7.5 MG: 5 TABLET ORAL at 09:40

## 2023-12-23 ASSESSMENT — ACTIVITIES OF DAILY LIVING (ADL)
ADLS_ACUITY_SCORE: 24
ADLS_ACUITY_SCORE: 27
ADLS_ACUITY_SCORE: 24
ADLS_ACUITY_SCORE: 27
ADLS_ACUITY_SCORE: 24
ADLS_ACUITY_SCORE: 24
ADLS_ACUITY_SCORE: 22
ADLS_ACUITY_SCORE: 27
ADLS_ACUITY_SCORE: 24
ADLS_ACUITY_SCORE: 29
ADLS_ACUITY_SCORE: 27
ADLS_ACUITY_SCORE: 27

## 2023-12-23 NOTE — PROGRESS NOTES
[facility] Medicine Progress Note  Date of Service (when I saw the patient): 12/23/2023    REASON FOR ADMISSION / INTERVAL HISTORY:  Eren James is a 78 year old male with past medical history of SVT, DVT recently diagnosed, chronic pain with intrathecal pump, and obstructive sleep apnea who is admitted 12/22 with bloody nose after referral from Dr. Kathleen ENT.   Not bleeding now. See details below.      Assessment/ Plan     Epistaxis  Posterior left nares bleeding status post packing in the ENT clinic with Dr. Kathleen. Recently started on AC for the DVT. Possible self injurous behavior, with insertion of his finger in his nose at an [facility] facility  Now returned with recurrent epistaxis  ENT/ hemeonc consulted for bleeding/ anticoag hannon DVT-need for IVC filter.  ENT seen. Recommend to keep packing in x 4 more days and to continue augmentin as long as packing in. Hemeonc seen -seedetails below.   - continue augmentin.     Recent diagnosis of DVT  Acute DVT in the left distal femoral, popliteal, posterior tibial, peroneal, and gastrocnemius veins noted on US through [facility] on 12/15 and was started on eliquis.  Eliquis held. Pt seen by oncology today. LLE US-today shows persistent clot. Hemeonc recommended to resume eliquis at 2.5 mg bid-first hannon tonight. Stop aspirin  -will resume eliquis tonight at 2.5mg/bid, discontinue aspirin     SVT with recurrence causing troponin elevation  hypokalemia  Recently seen at this hospital for this and had his metoprolol increased. Had run of SVT in ED-repeat EKG with improvement to NSR with HR in the 100s  Stable.   - continue metoprolol     Chronic pain syndrome: Controlled  Patient with implanted pain pump. Follows [facility] and was actually seen earlier 12/4 for pump refill. Primarily pain is in low back, but also with complaints of neuropathy of lower extremities related to his spinal stenosis. Patient was requesting IV opiate  "medications and has left his bolus device at home, similar to his last admission hence given  0.25 mcg/kg fentanyl up to 4x for 4 dosages. He had received 21 mcg q2 hours last admission for this same thing  * he was encouraged to bring his home bolus device  Getting basal rate through the pump  - continue the wellbutrin, duloxetine. Consider iv prn fentanyl ONLY if absolutely needed     BPH  - continue flomax and finasteride     ARLEN  Continue  inspire device     Gout  - allopurinol     HTN:  - continue losartan, metoprolol, and torsemide     GERD  - continue PPI       Diet: Combination Diet Regular Diet Adult    DVT Prophylaxis: Pneumatic Compression Devices  Alexander Catheter: Not present  Code Status: Full Code        DELGADO SCHULTZ MD   Pg [phone]        ROS:  As described in A/P and Exam.  Otherwise ALL are  negative.    PHYSICAL EXAM:  All vitals have been reviewed    Blood pressure 138/87, pulse 84, temperature 98.2  F (36.8  C), temperature source Oral, resp. rate 20, height 1.651 m (5' 5\"), weight 77.1 kg (170 lb), SpO2 99%.    I/O this shift:  In: 240 [P.O.:240]  Out: 200 [Urine:200]    GENERAL APPEARANCE: healthy, alert and no distress  EYES: conjunctiva clear, eyes grossly normal  HENT: external ears and nose normal   RESP: lungs clear to auscultation - no rales, rhonchi or wheezes  CV: regular rate and rhythm, normal S1 S2, no S3 or S4 and no murmur, click or rub   ABDOMEN: soft, nontender, no HSM or masses and bowel sounds normal  MS: no clubbing, cyanosis; no edema  SKIN: clear without significant rashes or lesions  NEURO: -non-focal moves all 4 extr    ROUTINE  LABS (Last four results)  CMP  Recent Labs   Lab 12/23/23  0553 12/22/23  1752 12/22/23  1541     --  139   POTASSIUM 3.5  3.5  --  3.2*   CHLORIDE 103  --  101   CO2 27  --  24   ANIONGAP 8  --  14   GLC 95  --  135*   BUN 8.3  --  8.2   CR 0.59*  --  0.55*   GFRESTIMATED >90  --  >90   SHAUN 8.8  --  9.3   MAG  --  1.8  --    PROTTOTAL " 5.6*  --   --    ALBUMIN 3.3*  --   --    BILITOTAL 0.9  --   --    ALKPHOS 133  --   --    AST 22  --   --    ALT 13  --   --      CBC  Recent Labs   Lab 12/23/23  1404 12/23/23  0553 12/23/23  0045 12/22/23  1541   WBC  --  5.5  --  6.8   RBC  --  3.33*  --  3.67*   HGB 10.8* 10.2* 10.1* 11.1*   HCT  --  30.9*  --  33.5*   MCV  --  93  --  91   MCH  --  30.6  --  30.2   MCHC  --  33.0  --  33.1   RDW  --  13.7  --  13.5   PLT  --  282  --  321     INRNo lab results found in last 7 days.  Arterial Blood GasNo lab results found in last 7 days.    No results found for this or any previous visit (from the past 24 hour(s)).

## 2023-12-23 NOTE — PLAN OF CARE
Goal Outcome Evaluation:    Pt here with Epistaxis, elevated trop, DVT  Neuro- A&O  Most Recent Vitals- Temp: 98.3  F (36.8  C) Temp src: Oral BP: (!) 134/98 Pulse: 92   Resp: 16 SpO2: 99 % O2 Device: None (Room air)   Tele/Cardiac- SR w/PAC/PVC  Resp- LS CTA, RA  Activity- SBA  walker  Pain- Severe pain in lower back. Fentanyl given.   Drains/Tubes- PIV (SL)  Skin- scattered bruising, surgical scars.   GI/- WNL  Aggression Color- Green  COVID status- Negative  Plan- Consult with heme/onc, possible DVT filter placement.     Dyana Mendoza RN

## 2023-12-23 NOTE — PROGRESS NOTES
Patient c/o back pain, fentanyl IV PRN administered, also has pain pump. MD ordered to continue fentanyl IV PRN X 4 more doses. Ultrasound done to assess DVT. Paged hem/onc MD with ultrasound results. Will continue plan of care.

## 2023-12-23 NOTE — CONSULTS
[facility]    Hematology/Oncology Consult Note      Date of Admission:  12/22/2023  Date of Consult:  12/23/23  Reason for Consult: IVC filter placement? In setting of bleeding       ASSESSMENT/PLAN:  Eren James is a 78 year old male who has a diagnosis of DVT recently with a history of varicose veins in the legs admitted with epistaxis    -Repeat left lower extremity ultrasound to assess the current status of the DVT if stable can start eliquis 2.5 mg tonite  -I think his bleeding risk became higher since he was on aspirin and Eliquis my recommendation would be to discontinue the aspirin  -If H&H stable by tomorrow and nosebleeds have subsided we will try 2.5 mg twice daily of Eliquis and see how he does his H&H overall stable today.  -Would not recommend IVC filter in this situation as his hemoglobin is overall stable and we can certainly try a dose reduction for his DVT treatment.  IVC filters would be more thrombogenic  -talk to cardiology about discontinuation of aspirin     I will follow-up on the ultrasound results tomorrow.  Discussed with the patient.  Thank you for the consult    Total time spent on day of visit, including review of tests, obtaining/reviewing separately obtained history, ordering medications/tests/procedures, communicating with PCP/consultants, and documenting in electronic medical record: 85  minutes         HISTORY OF PRESENT ILLNESS: Eren James is a 78-year-old male who we are asked to see for recommendations regarding the role of IVC filter in the setting of bleeding.      Patient was diagnosed withLeft lower extremity DVT on 12/15/2023.  At that time he was started on Eliquis.He presented with epistasis yesterday hemoglobin is around 10.5.  Neurointerventional radiology involved in case he needs embolization but currently he is being observed.  Also has a diagnosis of SVT.  He was on Eliquis and aspirin currently on hold      REVIEW OF SYSTEMS:   14  point ROS was reviewed and is negative other than as noted above in HPI.       MEDICATIONS:  Current Facility-Administered Medications   Medication    allopurinol (ZYLOPRIM) tablet 300 mg    amoxicillin-clavulanate (AUGMENTIN) 875-125 MG per tablet 1 tablet    atorvastatin (LIPITOR) tablet 40 mg    buPROPion (WELLBUTRIN XL) 24 hr tablet 150 mg    busPIRone (BUSPAR) half-tab 7.5 mg    calcium carbonate (TUMS) chewable tablet 1,000 mg    dicyclomine (BENTYL) tablet 20 mg    DULoxetine (CYMBALTA) DR capsule 60 mg    fentaNYL (PF) (SUBLIMAZE) injection 19.5 mcg    finasteride (PROSCAR) tablet 5 mg    fluticasone (FLONASE) 50 MCG/ACT spray 1 spray    isosorbide mononitrate (IMDUR) 24 hr tablet 60 mg    lidocaine (LMX4) cream    lidocaine 1 % 0.1-1 mL    losartan (COZAAR) tablet 50 mg    medication given by implanted intrathecal pump    metoprolol (LOPRESSOR) injection 2.5 mg    metoprolol succinate ER (TOPROL XL) 24 hr tablet 25 mg    naloxone (NARCAN) injection 0.2 mg    Or    naloxone (NARCAN) injection 0.4 mg    Or    naloxone (NARCAN) injection 0.2 mg    Or    naloxone (NARCAN) injection 0.4 mg    nitroGLYcerin (NITROSTAT) sublingual tablet 0.4 mg    ondansetron (ZOFRAN ODT) ODT tab 4 mg    Or    ondansetron (ZOFRAN) injection 4 mg    pantoprazole (PROTONIX) EC tablet 40 mg    senna-docusate (SENOKOT-S/PERICOLACE) 8.6-50 MG per tablet 1 tablet    Or    senna-docusate (SENOKOT-S/PERICOLACE) 8.6-50 MG per tablet 2 tablet    sodium chloride (PF) 0.9% PF flush 3 mL    sodium chloride (PF) 0.9% PF flush 3 mL    tamsulosin (FLOMAX) capsule 0.4 mg    torsemide (DEMADEX) tablet 10 mg    traZODone (DESYREL) half-tab 75 mg         ALLERGIES:  Allergies   Allergen Reactions    Armodafinil Other (See Comments)         PAST MEDICAL HISTORY:  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     See PSH    ASD (atrial septal defect)     Asthma     BPH (benign prostatic hyperplasia)     CHF  (congestive heart failure)     Cholelithiasis     Chronic neck pain     COPD (chronic obstructive pulmonary disease) (H)     Coronary artery disease     Previous coronary angioplasties    Deep vein thrombosis     Depressive disorder     Diverticulitis of colon     Esophageal reflux     Gout     Hyperlipidemia     Hypertension     Hypertrophy of prostate with urinary obstruction and other lower urinary tract symptoms (LUTS)     Kidney stone     Subdural hematoma after fall     SVT (supraventricular tachycardia)     Testicular hypofunction          PAST SURGICAL HISTORY:  Past Surgical History:   Procedure Laterality Date    Abdominal artery aneurysm ligation (not aortic aneurysm)      Colon surgery for diverticulitis  1970    COLONOSCOPY      Coronary angiogram with angioplasty      Coronary angiogram with coronary stents placed      CYSTOSCOPY, TRANSURETHRAL RESECTION (TUR) PROSTATE, COMBINED      PARATHYROIDECTOMY      TONSILLECTOMY, ADENOIDECTOMY, COMBINED  1973         SOCIAL HISTORY:  Social History     Socioeconomic History    Marital status:      Spouse name: Not on file    Number of children: Not on file    Years of education: Not on file    Highest education level: Not on file   Occupational History    Not on file   Tobacco Use    Smoking status: Never    Smokeless tobacco: Never   Substance and Sexual Activity    Alcohol use: No    Drug use: No    Sexual activity: Not on file   Other Topics Concern    Not on file   Social History Narrative    Not on file     Social Determinants of Health     Financial Resource Strain: Not on file   Food Insecurity: Not on file   Transportation Needs: Not on file   Physical Activity: Not on file   Stress: Not on file   Social Connections: Not on file   Interpersonal Safety: Not on file   Housing Stability: Not on file         FAMILY HISTORY:  Family History   Problem Relation Age of Onset    Cardiovascular Mother         living. hx bypass.    Cardiovascular Father      "    .cardiomegaly.    Diabetes Sister                  PHYSICAL EXAM:  Vital signs:  Temp: 98.2  F (36.8  C) Temp src: Oral BP: 138/87 Pulse: 84   Resp: 20 SpO2: 99 % O2 Device: None (Room air)   Height: 165.1 cm (5' 5\") Weight: 77.1 kg (170 lb)  Estimated body mass index is 28.29 kg/m  as calculated from the following:    Height as of this encounter: 1.651 m (5' 5\").    Weight as of this encounter: 77.1 kg (170 lb).    ECO  GENERAL/CONSTITUTIONAL: No acute distress.  EYES: Pupils are equal, round, and react to light and accommodation. Extraocular movements intact.  No scleral icterus.  ENT/MOUTH: Neck supple. Oropharynx clear, no mucositis.  LYMPH: No anterior cervical, posterior cervical, supraclavicular, axillary or inguinal adenopathy.   RESPIRATORY: Clear to auscultation bilaterally. No crackles or wheezing.   CARDIOVASCULAR: Regular rate and rhythm without murmurs, gallops, or rubs.  GASTROINTESTINAL: No hepatosplenomegaly, masses, or tenderness. The patient has normal bowel sounds. No guarding.  No distention.  MUSCULOSKELETAL: left lle larger than Right lower exxt  NEUROLOGIC: Cranial nerves II-XII are intact. Alert, oriented, answers questions appropriately.  INTEGUMENTARY: No rashes or jaundice.  GAIT: Steady, does not use assistive device      LABS:  CBC RESULTS:   Recent Labs   Lab Test 23  0553   WBC 5.5   RBC 3.33*   HGB 10.2*   HCT 30.9*   MCV 93   MCH 30.6   MCHC 33.0   RDW 13.7          Recent Labs   Lab Test 23  0553 23  1541    139   POTASSIUM 3.5  3.5 3.2*   CHLORIDE 103 101   CO2 27 24   ANIONGAP 8 14   GLC 95 135*   BUN 8.3 8.2   CR 0.59* 0.55*   SHAUN 8.8 9.3         PATHOLOGY:  none    IMAGING:  Noted           Thank you for the opportunity to participate in this patient's care.  Please call with any questions.    Magdi Cortes MD  Hematology/Oncology  [facility]   [phone]  "

## 2023-12-23 NOTE — PROVIDER NOTIFICATION
MD Notification    Notified Person: MD    Notified Person Name:Mckenzie Nelson    Notification Date/Time:12/23/23 @ 1405    Notification Interaction:vocera    Purpose of Notification: can patient transfer to medical floor?    Orders Received:Patient can transfer to medical floor, no telemetry    Comments:

## 2023-12-23 NOTE — PLAN OF CARE
Goal Outcome Evaluation:  A&Ox4  Tele SR with PVCs and PACs. Denies chest pain. Up with assist of 1 and walker. Left nostril packed in ER. Per ENT, he has to keep it in for 4 days. Patient complains of back pain, Fentanyl IV PRN; also has pain pump.  Repeat LLE US to assesses DVT .  Hgb q8h  Evening nurse to call Hem/Onc physician with US results.

## 2023-12-23 NOTE — PROVIDER NOTIFICATION
MD Notification    Notified Person: MD    Notified Person Name:Lonny CARVAJAL    Notification Date/Time:12/13/23 @ 1312    Notification Interaction:vocera    Purpose of Notification: Hem/onc recommends left lower extremity US    Orders Received: yes    Comments:

## 2023-12-23 NOTE — CONSULTS
78-year-old male admitted 1222 with a history of epistaxis.  He has had history of recurrent trips to the emergency room for left-sided epistaxis.  This has been noted over the past 2 days.  He had been started on Eliquis December 15th for a DVT of the lower extremity.  He has had at least 2 attempts at nasal cautery and then had packing placed in the left side by Dr. Kathleen on the 22nd.  With continued breathing he was advised to come to the emergency room and possible admission.  He at this time complains of difficulty breathing through his nose.  He is not expectorating any blood.  He continues to dab out his nose.  He has had some degree of hypertension noted.    History   Chief Complaint:  Epistaxis     HPI   History supplemented by electronic chart review     Eren James is a 78 year old male who presents by EMS for evaluation of epistaxis.  He states that he was just at the ENT clinic, he was seen by Dr. Jakob Kathleen, he had packing done of his left-sided epistaxis, though when he was in the parking lot, he stated he started to have some more bleeding, he is talked with Dr. Kathleen again who told him to come to the ER.  The patient has had 3 prior emergency department visits since 11 AM yesterday, the first 2 in the [facility] system and then just after midnight today at [facility].  He was started on Eliquis on December 15 after diagnosis of a left leg DVT.  He reports while he was in the ambulance, he felt his heart start to go fast associated with some slight chest pain, he has had this in the past and has been diagnosed with paroxysmal SVT for which she takes metoprolol 25 mg extended release.     Independent Historian: EMS, who reports that the patient had tachycardia with heart rates up into the 170s during transport     Review of External Notes: I personally reviewed his multiple recent notes including a visit at 11 AM yesterday to an [facility] facility for left-sided epistaxis, he had liquid nitrogen cautery  "performed, then he was at an [facility] ED at 8 PM yesterday, see excerpt below.  He was admitted at [facility] in early December, has known SVT, recent echocardiogram showed an ejection fraction of 55 to 60% with no valvular disease.     From [facility] ED note yesterday:  \"On initial evaluation, he had no bleeding from either nare and we had a very lengthy discussion regarding how to control the bleeding at home and that he needs to follow-up with an ear nose and throat doctor if he continues to have issues.  He felt very unsatisfied with this and felt like he needed to be admitted to the hospital to be observed overnight.  He stated that he was here last week and he was very disappointed by the fact that he was not admitted and he subsequently presented to [place] and was admitted and states that he has been talking to his attorneys to try to ameliorate this because he feels like he was note appropriately cared for.  I had to repeatedly express to him that there was no current indication for admission to the hospital given the fact that he is not having currently bleeding and that his vital signs are normal any has not had any significant bleeding or other concerning symptoms to warrant this.  He was discharged home with very specific instructions and referral to ENT to follow-up with and strict return precautions.     Upon exiting after discharge, the patient was witnessed to insert his finger into his nose and induce epistaxis and demanded to be seen again.  He was roomed again.  He was reassessed and again noted to have no active bleeding from either nare.  Nasal clip was in place and left for 15 minutes with successful control of bleeding.  He again demanded to be seen and admitted into the hospital and was overheard calling his wife stating that he will not be coming home because he needs to stay here overnight in the hospital.  He was observed for over an hour with no recurrence of epistaxis and after leaving, he " "demanded to be evaluated again and was noted to be having very slow dripping of blood from his right nare.  We reviewed various options of management including silver nitrate cautery, packing of the nose with TXA soaked Merisel, etc.  He stated that he did not want to have the nose packed and that he could not understand how there were not medications to fix this problem for him.  He refused to have the nose packed and chose to be discharged home but then after pursuing this option, he changed mind again and stated that he wanted to have his nose packed.  Supplies were obtained and ordered and upon returning to the room, he again changed his mind and refused to have the nose packed.  He again expressed his desire to be admitted to the hospital and I again emphasized the lack of indication for this.  He finally decided that he would like to have silver nitrate cautery and this was performed without incident.  He was witnessed to have no further bleeding and was discharged home with specific instructions to follow-up with ENT.\"     Medications:    No current outpatient medications on file.        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      ASD (atrial septal defect)      Asthma      BPH (benign prostatic hyperplasia)      CHF (congestive heart failure)      Cholelithiasis      Chronic neck pain      COPD (chronic obstructive pulmonary disease) (H)      Coronary artery disease      Deep vein thrombosis      Depressive disorder      Diverticulitis of colon      Esophageal reflux      Gout      Hyperlipidemia      Hypertension      Hypertrophy of prostate with urinary obstruction and other lower urinary tract symptoms (LUTS)      Kidney stone      Subdural hematoma after fall      SVT (supraventricular tachycardia)      Testicular hypofunction                 Patient Active Problem List     Diagnosis Date Noted    Epistaxis 12/22/2023       " Priority: Medium    Hypokalemia 12/22/2023       Priority: Medium    Elevated troponin 12/22/2023       Priority: Medium    Anticoagulated 12/22/2023       Priority: Medium    Anemia, unspecified type 12/22/2023       Priority: Medium    Paroxysmal supraventricular tachycardia 12/04/2023       Priority: Medium    Palpitations 12/04/2023       Priority: Medium    Shortness of breath 12/04/2023       Priority: Medium    Generalized weakness 12/04/2023       Priority: Medium    Hypertrophy of prostate with urinary obstruction 08/06/2005       Priority: Medium       Problem list name updated by automated process. Provider to review       Peptic ulcer 08/01/2005       Priority: Medium       Last endoscopy in 5/06;  No ulcers seen  Problem list name updated by automated process. Provider to review       Insomnia 08/19/2004       Priority: Medium       Problem list name updated by automated process. Provider to review       Generalized hyperhidrosis 09/25/2003       Priority: Medium    Trigger finger, acquired 08/19/2003       Priority: Medium       Problem list name updated by automated process. 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 Provider to review       Esophageal reflux 08/06/2002       Priority: Medium    Cardiovascular disease 08/06/2002       Priority: Medium       Problem list name updated by automated process. Provider to review       Coronary atherosclerosis 08/06/2002       Priority: Medium       Multiple angioplasties  Last stent in 1/06  Problem list name updated by automated process. Provider to review       Essential hypertension, benign 08/06/2002       Priority: Medium    Diverticulosis of large intestine 08/06/2002       Priority: Medium       Problem list name updated by automated process. Provider to review       Aneurysm of other visceral artery 08/06/2002       Priority: Medium    Calculus of kidney  08/06/2002       Priority: Medium    Sciatica 08/06/2002       Priority: Medium    Backache 08/06/2002       Priority: Medium       Problem list name updated by automated process. Provider to review       Hyperlipidemia 08/06/2002       Priority: Medium       Problem list name updated by automated process. Provider to review       Depressive disorder, not elsewhere classified 08/06/2002       Priority: Medium           History   Chief Complaint:  Epistaxis     HPI   History supplemented by electronic chart review     Eren James is a 78 year old male who presents by EMS for evaluation of epistaxis.  He states that he was just at the ENT clinic, he was seen by Dr. Jakob Kathleen, he had packing done of his left-sided epistaxis, though when he was in the parking lot, he stated he started to have some more bleeding, he is talked with Dr. Kathleen again who told him to come to the ER.  The patient has had 3 prior emergency department visits since 11 AM yesterday, the first 2 in the [facility] system and then just after midnight today at [facility].  He was started on Eliquis on December 15 after diagnosis of a left leg DVT.  He reports while he was in the ambulance, he felt his heart start to go fast associated with some slight chest pain, he has had this in the past and has been diagnosed with paroxysmal SVT for which she takes metoprolol 25 mg extended release.     Independent Historian: EMS, who reports that the patient had tachycardia with heart rates up into the 170s during transport     Review of External Notes: I personally reviewed his multiple recent notes including a visit at 11 AM yesterday to an [facility] facility for left-sided epistaxis, he had liquid nitrogen cautery performed, then he was at an [facility] ED at 8 PM yesterday, see excerpt below.  He was admitted at [facility] in early December, has known SVT, recent echocardiogram showed an ejection fraction of 55 to 60% with no valvular disease.     From [facility] ED note  "yesterday:  \"On initial evaluation, he had no bleeding from either nare and we had a very lengthy discussion regarding how to control the bleeding at home and that he needs to follow-up with an ear nose and throat doctor if he continues to have issues.  He felt very unsatisfied with this and felt like he needed to be admitted to the hospital to be observed overnight.  He stated that he was here last week and he was very disappointed by the fact that he was not admitted and he subsequently presented to [place] and was admitted and states that he has been talking to his attorneys to try to ameliorate this because he feels like he was note appropriately cared for.  I had to repeatedly express to him that there was no current indication for admission to the hospital given the fact that he is not having currently bleeding and that his vital signs are normal any has not had any significant bleeding or other concerning symptoms to warrant this.  He was discharged home with very specific instructions and referral to ENT to follow-up with and strict return precautions.     Upon exiting after discharge, the patient was witnessed to insert his finger into his nose and induce epistaxis and demanded to be seen again.  He was roomed again.  He was reassessed and again noted to have no active bleeding from either nare.  Nasal clip was in place and left for 15 minutes with successful control of bleeding.  He again demanded to be seen and admitted into the hospital and was overheard calling his wife stating that he will not be coming home because he needs to stay here overnight in the hospital.  He was observed for over an hour with no recurrence of epistaxis and after leaving, he demanded to be evaluated again and was noted to be having very slow dripping of blood from his right nare.  We reviewed various options of management including silver nitrate cautery, packing of the nose with TXA soaked Merisel, etc.  He stated that he " "did not want to have the nose packed and that he could not understand how there were not medications to fix this problem for him.  He refused to have the nose packed and chose to be discharged home but then after pursuing this option, he changed mind again and stated that he wanted to have his nose packed.  Supplies were obtained and ordered and upon returning to the room, he again changed his mind and refused to have the nose packed.  He again expressed his desire to be admitted to the hospital and I again emphasized the lack of indication for this.  He finally decided that he would like to have silver nitrate cautery and this was performed without incident.  He was witnessed to have no further bleeding and was discharged home with specific instructions to follow-up with ENT.\"     Medications:    No current outpatient medications on file.        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      ASD (atrial septal defect)      Asthma      BPH (benign prostatic hyperplasia)      CHF (congestive heart failure)      Cholelithiasis      Chronic neck pain      COPD (chronic obstructive pulmonary disease) (H)      Coronary artery disease      Deep vein thrombosis      Depressive disorder      Diverticulitis of colon      Esophageal reflux      Gout      Hyperlipidemia      Hypertension      Hypertrophy of prostate with urinary obstruction and other lower urinary tract symptoms (LUTS)      Kidney stone      Subdural hematoma after fall      SVT (supraventricular tachycardia)      Testicular hypofunction                 Patient Active Problem List     Diagnosis Date Noted    Epistaxis 12/22/2023       Priority: Medium    Hypokalemia 12/22/2023       Priority: Medium    Elevated troponin 12/22/2023       Priority: Medium    Anticoagulated 12/22/2023       Priority: Medium    Anemia, unspecified type 12/22/2023       Priority: Medium    Paroxysmal " supraventricular tachycardia 12/04/2023       Priority: Medium    Palpitations 12/04/2023       Priority: Medium    Shortness of breath 12/04/2023       Priority: Medium    Generalized weakness 12/04/2023       Priority: Medium    Hypertrophy of prostate with urinary obstruction 08/06/2005       Priority: Medium       Problem list name updated by automated process. Provider to review       Peptic ulcer 08/01/2005       Priority: Medium       Last endoscopy in 5/06;  No ulcers seen  Problem list name updated by automated process. Provider to review       Insomnia 08/19/2004       Priority: Medium       Problem list name updated by automated process. Provider to review       Generalized hyperhidrosis 09/25/2003       Priority: Medium    Trigger finger, acquired 08/19/2003       Priority: Medium       Problem list name updated by automated process. 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 Provider to review       Esophageal reflux 08/06/2002       Priority: Medium    Cardiovascular disease 08/06/2002       Priority: Medium       Problem list name updated by automated process. Provider to review       Coronary atherosclerosis 08/06/2002       Priority: Medium       Multiple angioplasties  Last stent in 1/06  Problem list name updated by automated process. Provider to review       Essential hypertension, benign 08/06/2002       Priority: Medium    Diverticulosis of large intestine 08/06/2002       Priority: Medium       Problem list name updated by automated process. Provider to review       Aneurysm of other visceral artery 08/06/2002       Priority: Medium    Calculus of kidney 08/06/2002       Priority: Medium    Sciatica 08/06/2002       Priority: Medium    Backache 08/06/2002       Priority: Medium       Problem list name updated by automated process. Provider to review       Hyperlipidemia 08/06/2002       Priority:  Medium       Problem list name updated by automated process. Provider to review       Depressive disorder, not elsewhere classified 08/06/2002       Priority: Medium         The examination revealed the pended to be unremarkable.  The nose showed sponge packing in the left nares without active bleeding.  There is a small clot near the nostril opening.  The oral cavity did not reveal any significant active bleeding in the posterior oropharynx the neck was without significant adenopathy    Impression recurrent epistaxis in anticoagulated patient      Plan: At this point he is not actively bleeding.  I would leave the packing in a minimum of 4 days.  If he is doing well and maintaining his saturations as in the hospital there is a potential for discharge.  He should remain on the Augmentin while the packing is in place.  He will need to continue to have his hypertension controlled.  He may have follow-up with Dr. Kathleen if he prefers or may call our office for follow-up and possible packing removal.  With the history of the DVT and the packing in place it may be reasonable to restart the Eliquis.  Please call if you have further questions

## 2023-12-23 NOTE — ED NOTES
"Page to Dr Cuenca:  \"Pt on call light frequently stating his pain is not controlled.  Did receive dose of fentynal at 1900 and states it did not help.  Any additional orders?\"  "

## 2023-12-23 NOTE — PHARMACY-ADMISSION MEDICATION HISTORY
Pharmacist Admission Medication History    Admission medication history is complete. The information provided in this note is only as accurate as the sources available at the time of the update.    Information Source(s): Patient and CareEverywhere/SureScripts via in-person    Pertinent Information: See below  Updated IT pump information from Oncall RN at [facility]    Changes made to PTA medication list:  Added: Augmentin X5 days (patient has 5 more tablets remaining--last dose 12/24/23), buspirone  Deleted: naloxegol (Patient states no longer taking)  Changed: losartan 100 mg daily--> 50 mg daily (decreased from 100 mg daily to 50 mg daily since discharging from last hospitalization    Medication Affordability:  Not including over the counter (OTC) medications, was there a time in the past 3 months when you did not take your medications as prescribed because of cost?: No    Allergies reviewed with patient and updates made in EHR: no    Medication History Completed By: Angelica Edwards [facility] 12/22/2023 7:49 PM    Prior to Admission medications    Medication Sig Last Dose Taking? Auth Provider Long Term End Date   allopurinol (ZYLOPRIM) 300 MG tablet TAKE 1 TABLET(300 MG) BY MOUTH EVERY DAY Strength: 300 mg 12/22/2023 at am Yes Reported, Patient     amoxicillin-clavulanate (AUGMENTIN) 875-125 MG tablet Take 1 tablet by mouth 2 times daily For 5 days 12/22/2023 at am Yes Unknown, Entered By History  12/24/23   apixaban ANTICOAGULANT (ELIQUIS) 5 MG tablet Take 5 mg by mouth 2 times daily 12/22/2023 at am Yes Reported, Patient     aspirin 81 MG EC tablet Take 81 mg by mouth daily 12/22/2023 at am Yes Unknown, Entered By History     atorvastatin (LIPITOR) 40 MG tablet Take 40 mg by mouth daily 12/22/2023 at am Yes Reported, Patient     B Complex CAPS Take 1 tablet by mouth daily 12/22/2023 at am Yes Reported, Patient     buPROPion (WELLBUTRIN XL) 150 MG 24 hr tablet Take 150 mg by mouth every morning 12/22/2023 at am Yes  Reported, Patient Yes    busPIRone (BUSPAR) 7.5 MG tablet Take 7.5 mg by mouth 2 times daily 12/22/2023 at am Yes Unknown, Entered By History Yes    DULoxetine (CYMBALTA) 30 MG capsule Take 60 mg by mouth every morning 12/22/2023 at am Yes Reported, Patient Yes    finasteride (PROSCAR) 5 MG tablet Take 1 tablet by mouth daily 12/22/2023 at am Yes Reported, Patient     FLOMAX 0.4 MG OR CP24 1 TABLET DAILY AFTER A MEAL 12/22/2023 at am Yes Jakob Conner MD     isosorbide mononitrate (IMDUR) 60 MG 24 hr tablet Take 60 mg by mouth daily 12/22/2023 at am Yes Unknown, Entered By History     losartan (COZAAR) 100 MG tablet Take 0.5 tablets (50 mg) by mouth daily 12/22/2023 at am Yes Adan Rincon MD Yes    medication given by implanted intrathecal pump continuous Drug # 1: Bupivacaine (Marcaine)  - Conc:25 mg/mL - Total Dose / 24 hours: 16.95 mg    Drug # 2: Hydromorphone (Dilaudid)  - Conc:0.3 mg/mL - Total Dose / 24 hours: 0.2 mg    Drug # 3: Fentanyl (Sublimaze) - Conc: 1667 mcg/mL - Total Dose / 24 hours: 1130 mcg    Diluent: NS    May give boluses every 2 hours for up to 5 boluses per day of hydromorphone at 0.1mg, fentanyl at 40 mcg and bupivacaine 0.6 mg in a 24 hour period   Pump Reservoir Volume: 40 mL  Outside Clinic & Provider: [facility]   Last Refill Date: 12/04/2023  Next Refill Date: 1/16/2024  Low Vance Alarm Date: 1/20/2024  Pump : [employer]     Please consider consulting the pain team if the patient is admitted with an IT pump.  Yes Unknown, Entered By History     metoprolol succinate ER (TOPROL XL) 25 MG 24 hr tablet Take 1 tablet (25 mg) by mouth daily 12/22/2023 at am Yes Adan Rincon MD Yes    MULTIVITAMINS OR TABS 1 tablet daily 12/22/2023 at am Yes Jakob Conner MD     pantoprazole (PROTONIX) 40 MG EC tablet Take 40 mg by mouth daily 12/22/2023 at am Yes Reported, Patient     Potassium Chloride MONICA Take 20 mEq by mouth daily 12/22/2023 at am  Yes Reported, Patient     torsemide (DEMADEX) 10 MG tablet Take 10 mg by mouth daily 12/22/2023 at am Yes Reported, Patient Yes    traZODone (DESYREL) 50 MG tablet Take 75 mg by mouth at bedtime 12/21/2023 at pm Yes Reported, Patient     chlorhexidine (PERIDEX) 0.12 % solution Swish and spit 15 mLs in mouth 2 times daily as needed PRN  Reported, Patient     dicyclomine (BENTYL) 20 MG tablet Take 20 mg by mouth 3 times daily as needed PRN  Unknown, Entered By History     fluorometholone (FML LIQUIFILM) 0.1 % ophthalmic suspension Place 1 drop into both eyes as needed (dry eyes) PRN  Reported, Patient     fluticasone (FLONASE) 50 MCG/ACT nasal spray SHAKE LIQUID WELL AND INHALE 2 SPRAYS INTO AFFECTED NOSTRILS TWICE DAILY AS NEEDED FOR RHINITIS PRN  Reported, Patient     lidocaine (XYLOCAINE) 5 % external ointment APPLY TOPICALLY TO THE AFFECTED AREA THREE TIMES DAILY AS NEEDED PRN  Reported, Patient     NARCAN 4 MG/0.1ML nasal spray once as needed PRN  Reported, Patient Yes    nitroGLYcerin (NITROSTAT) 0.4 MG sublingual tablet DISSOLVE 1 TABLET UNDER THE TONGUE AS NEEDED FOR CHEST PAIN EVERY 5 MINUTES AS NEEDED AS DIRECTED PRN  Reported, Patient Yes    ondansetron (ZOFRAN ODT) 4 MG ODT tab DISSOLVE 1 TABLET(4 MG) ON THE TONGUE EVERY 8 HOURS AS NEEDED FOR NAUSEA OR VOMITING PRN  Reported, Patient

## 2023-12-24 LAB
HGB BLD-MCNC: 10.2 G/DL (ref 13.3–17.7)
MAGNESIUM SERPL-MCNC: 1.9 MG/DL (ref 1.7–2.3)
POTASSIUM SERPL-SCNC: 3.8 MMOL/L (ref 3.4–5.3)

## 2023-12-24 PROCEDURE — 250N000013 HC RX MED GY IP 250 OP 250 PS 637: Performed by: INTERNAL MEDICINE

## 2023-12-24 PROCEDURE — 85018 HEMOGLOBIN: CPT | Performed by: INTERNAL MEDICINE

## 2023-12-24 PROCEDURE — 120N000001 HC R&B MED SURG/OB

## 2023-12-24 PROCEDURE — 83735 ASSAY OF MAGNESIUM: CPT | Performed by: INTERNAL MEDICINE

## 2023-12-24 PROCEDURE — 84132 ASSAY OF SERUM POTASSIUM: CPT | Performed by: INTERNAL MEDICINE

## 2023-12-24 PROCEDURE — 36415 COLL VENOUS BLD VENIPUNCTURE: CPT | Performed by: INTERNAL MEDICINE

## 2023-12-24 PROCEDURE — 250N000013 HC RX MED GY IP 250 OP 250 PS 637: Performed by: HOSPITALIST

## 2023-12-24 PROCEDURE — 99239 HOSP IP/OBS DSCHRG MGMT >30: CPT | Performed by: INTERNAL MEDICINE

## 2023-12-24 PROCEDURE — 250N000011 HC RX IP 250 OP 636: Performed by: INTERNAL MEDICINE

## 2023-12-24 RX ADMIN — AMOXICILLIN AND CLAVULANATE POTASSIUM 1 TABLET: 875; 125 TABLET, FILM COATED ORAL at 21:33

## 2023-12-24 RX ADMIN — FENTANYL CITRATE 19.5 MCG: 50 INJECTION, SOLUTION INTRAMUSCULAR; INTRAVENOUS at 16:55

## 2023-12-24 RX ADMIN — METOPROLOL SUCCINATE 25 MG: 25 TABLET, EXTENDED RELEASE ORAL at 10:40

## 2023-12-24 RX ADMIN — PANTOPRAZOLE SODIUM 40 MG: 40 TABLET, DELAYED RELEASE ORAL at 10:39

## 2023-12-24 RX ADMIN — BUPROPION HYDROCHLORIDE 150 MG: 150 TABLET, FILM COATED, EXTENDED RELEASE ORAL at 10:38

## 2023-12-24 RX ADMIN — BUSPIRONE HYDROCHLORIDE 7.5 MG: 5 TABLET ORAL at 21:33

## 2023-12-24 RX ADMIN — FENTANYL CITRATE 19.5 MCG: 50 INJECTION, SOLUTION INTRAMUSCULAR; INTRAVENOUS at 23:16

## 2023-12-24 RX ADMIN — TAMSULOSIN HYDROCHLORIDE 0.4 MG: 0.4 CAPSULE ORAL at 10:39

## 2023-12-24 RX ADMIN — TORSEMIDE 10 MG: 10 TABLET ORAL at 10:39

## 2023-12-24 RX ADMIN — FINASTERIDE 5 MG: 5 TABLET, FILM COATED ORAL at 10:39

## 2023-12-24 RX ADMIN — AMOXICILLIN AND CLAVULANATE POTASSIUM 1 TABLET: 875; 125 TABLET, FILM COATED ORAL at 10:40

## 2023-12-24 RX ADMIN — BUSPIRONE HYDROCHLORIDE 7.5 MG: 5 TABLET ORAL at 10:40

## 2023-12-24 RX ADMIN — DULOXETINE HYDROCHLORIDE 60 MG: 60 CAPSULE, DELAYED RELEASE ORAL at 10:39

## 2023-12-24 RX ADMIN — LOSARTAN POTASSIUM 50 MG: 50 TABLET, FILM COATED ORAL at 10:39

## 2023-12-24 RX ADMIN — APIXABAN 2.5 MG: 2.5 TABLET, FILM COATED ORAL at 10:40

## 2023-12-24 RX ADMIN — DOCUSATE SODIUM 50 MG AND SENNOSIDES 8.6 MG 2 TABLET: 8.6; 5 TABLET, FILM COATED ORAL at 12:52

## 2023-12-24 RX ADMIN — ATORVASTATIN CALCIUM 40 MG: 40 TABLET, FILM COATED ORAL at 10:39

## 2023-12-24 RX ADMIN — FENTANYL CITRATE 19.5 MCG: 50 INJECTION, SOLUTION INTRAMUSCULAR; INTRAVENOUS at 05:03

## 2023-12-24 RX ADMIN — ISOSORBIDE MONONITRATE 60 MG: 60 TABLET, EXTENDED RELEASE ORAL at 10:39

## 2023-12-24 RX ADMIN — ALLOPURINOL 300 MG: 300 TABLET ORAL at 10:40

## 2023-12-24 RX ADMIN — APIXABAN 2.5 MG: 2.5 TABLET, FILM COATED ORAL at 21:33

## 2023-12-24 ASSESSMENT — ACTIVITIES OF DAILY LIVING (ADL)
ADLS_ACUITY_SCORE: 27

## 2023-12-24 NOTE — PLAN OF CARE
Goal Outcome Evaluation:  A&ox4, VSS on RA. Ax1 walker. Regular diet. Cooperative although at times anxious and impulsive, refused bed alarm. Scheduled trazodone at bed time.  PRN Fentanyl 19.5mcg given at 0500 for chronic lower back pain. On Tele NSR. Left nostril packing with dry dark red/black minimal oozing, packing attaced to left cheek. Denies lightheadedness, dizziness, VSS except slight HTN. Scattered bruising, scab to L/R shin.

## 2023-12-24 NOTE — PROGRESS NOTES
Ok to discharge spoke with dR Rincon. Our office will call and schedule appt in 2-3 weeks. On elqiuis 5 mg bid . No aspirin.     Oncology will so    Magdi Cortes MD

## 2023-12-24 NOTE — PROGRESS NOTES
Home medications are in Pharmacy and a message will have to be sent to get them when patient is ready to go home.

## 2023-12-24 NOTE — PROGRESS NOTES
Afebrile. Patient sleeping comfortably, O2 sats maintained on room air.     Exam : no active bleeding around packing or in posterior pharynx    Stable  Plan: may discharge home from ent standpoint with follow up Tuesday for packing removal. Should stay on antibiotics till pack removed. Saw Dr Kathleen for packing and may follow with him as outpatient

## 2023-12-24 NOTE — DISCHARGE SUMMARY
[facility] Hospitalist Discharge Summary    Eren James MRN# [id number]   Age: 78 year old YOB: 1945     Date of Admission:  12/22/2023  Date of Discharge::  12/24/2023  Admitting Physician:  Cornel Bateman DO  Discharge Physician:  Omar Rincon MD  Primary Physician: Jakob Conner  Transferring Facility: N/A     Home clinic: unknown          Admission Diagnoses:   Paroxysmal supraventricular tachycardia [I47.10]  Epistaxis [R04.0]  Hypokalemia [E87.6]  Elevated troponin [R79.89]  Anticoagulated [Z79.01]  Anemia, unspecified type [D64.9]          Discharge Diagnosis:     Principle diagnosis: epistaxis  Secondary diagnoses:  Patient Active Problem List   Diagnosis    Esophageal reflux    Cardiovascular disease    Coronary atherosclerosis    Essential hypertension, benign    Diverticulosis of large intestine    Aneurysm of other visceral artery    Calculus of kidney    Sciatica    Backache    Hyperlipidemia    Depressive disorder, not elsewhere classified    Testicular hypofunction    Trigger finger, acquired    Generalized hyperhidrosis    Insomnia    Peptic ulcer    Hypertrophy of prostate with urinary obstruction    Paroxysmal supraventricular tachycardia    Palpitations    Shortness of breath    Generalized weakness    Epistaxis    Hypokalemia    Elevated troponin    Anticoagulated    Anemia, unspecified type          Brief History of Presenting Illness:   As per admit hx  Eren James is a 78 year old male with past medical history of SVT, DVT recently diagnosed, chronic pain with intrathecal pump, and obstructive sleep apnea who presents with bloody nose after referral from Dr. Kathleen ENT.  The patient was recently diagnosed with a DVT and started on Eliquis on December 15 after he developed swelling in his left leg.  He has had 3 emergency department visits starting yesterday for bleeding that developed primarily through the [facility] system..  He was treated first with  nitrogen cautery and then discharged.  He was told to follow-up with the ENT as an outpatient.  He further presented with more bleeding and when told he is not admitted there is potentially an episode where he put his finger and is noted to induce bleeding.  In this context I reevaluated him and did end up packing his nose and then using silver nitrate cautery and discharging him again.     Today he saw the ears nose and throat doctor who provided posterior packing for what was considered a left-sided posterior nosebleed.  He developed oozing upon returning to the car and went back into see the ENT doctor however was referred to the emergency department for further evaluation.     The emergency provider did speak with ENT who advised if the patient has significant bleeding that neurointerventional radiology would be consulted for potential embolization of the nasal arteries.  Dr. Mejia did speak with neurointerventional radiology with the plan to watch given that the bleeding is very mild.     To further complicate things while in the emergency department he was found to have recurrence of his supraventricular tachycardia with heart rates of 160.  This self terminated.     The patient reports that his last Eliquis dose was this morning.  His big concern is that he left his bolus pump for his intrathecal pain pump at home and is requesting dosages of IV fentanyl that he received during his last admission here for SVT.            Hospital Course:   Epistaxis  Posterior left nares bleeding status post packing in the ENT clinic with Dr. Kathleen. Recently started on AC for the DVT. Possible self injurous behavior, with insertion of his finger in his nose at an [facility] facility  Now returned with recurrent epistaxis. ENT/ hemeonc consulted for bleeding/ anticoag   ENT seen. Recommend to keep packing in x 4 more days and to continue augmentin as long as packing in. Hemeonc seen -seedetails below.   Doing well. No more  bleeding.  - continue augmentin x 5 days. F/up ENT as recommended.      Recent diagnosis of DVT  Acute DVT in the left distal femoral, popliteal, posterior tibial, peroneal, and gastrocnemius veins noted on US through [facility] on 12/15 and was started on eliquis.  Eliquis held. Pt seen by oncology today. LLE US-12/23 shows persistent clot[facility] 2.5 mg bid- and  Stop aspirin. Got 2 doses of eliquis so far-hg remains stable.   Discussed with hemeonc. Will discharge on 5mg bid eliquis. Stop aspirin. F/up OP     SVT with recurrence causing troponin elevation  hypokalemia  Recently seen at this hospital for this and had his metoprolol increased. Had run of SVT in ED-repeat EKG with improvement to NSR with HR in the 100s  Stable.   - continue metoprolol     Chronic pain syndrome: Controlled  Patient with implanted pain pump. Follows with [facility] pain clinic and was actually seen earlier 12/4 for pump refill. Primarily pain is in low back, but also with complaints of neuropathy of lower extremities related to his spinal stenosis. Patient was requesting IV opiate medications and has left his bolus device at home, similar to his last admission hence given  0.25 mcg/kg fentanyl up to 4x for 4 dosages. He had received 21 mcg q2 hours last admission for this same thing  * he was encouraged to bring his home bolus device  Getting basal rate through the pump  - continue the wellbutrin, duloxetine. Continue home pump as PTA     BPH  - continue flomax and finasteride     ARLEN  Continue  inspire device     Gout  - allopurinol     HTN:  - continue losartan, metoprolol, and torsemide     GERD  - continue PPI          Procedures:   No procedures performed during this admission         Allergies:      Allergies   Allergen Reactions    Armodafinil Other (See Comments)             Medications Prior to Admission:     Medications Prior to Admission   Medication Sig Dispense Refill Last Dose    allopurinol  (ZYLOPRIM) 300 MG tablet TAKE 1 TABLET(300 MG) BY MOUTH EVERY DAY Strength: 300 mg   12/22/2023 at am    atorvastatin (LIPITOR) 40 MG tablet Take 40 mg by mouth daily   12/22/2023 at am    B Complex CAPS Take 1 tablet by mouth daily   12/22/2023 at am    buPROPion (WELLBUTRIN XL) 150 MG 24 hr tablet Take 150 mg by mouth every morning   12/22/2023 at am    busPIRone (BUSPAR) 7.5 MG tablet Take 7.5 mg by mouth 2 times daily   12/22/2023 at am    DULoxetine (CYMBALTA) 30 MG capsule Take 60 mg by mouth every morning   12/22/2023 at am    finasteride (PROSCAR) 5 MG tablet Take 1 tablet by mouth daily   12/22/2023 at am    FLOMAX 0.4 MG OR CP24 1 TABLET DAILY AFTER A MEAL 30 11 12/22/2023 at am    isosorbide mononitrate (IMDUR) 60 MG 24 hr tablet Take 60 mg by mouth daily   12/22/2023 at am    losartan (COZAAR) 100 MG tablet Take 0.5 tablets (50 mg) by mouth daily   12/22/2023 at am    medication given by implanted intrathecal pump continuous Drug # 1: Bupivacaine (Marcaine)  - Conc:25 mg/mL - Total Dose / 24 hours: 16.95 mg    Drug # 2: Hydromorphone (Dilaudid)  - Conc:0.3 mg/mL - Total Dose / 24 hours: 0.2 mg    Drug # 3: Fentanyl (Sublimaze) - Conc: 1667 mcg/mL - Total Dose / 24 hours: 1130 mcg    Diluent: NS    May give boluses every 2 hours for up to 5 boluses per day of hydromorphone at 0.1mg, fentanyl at 40 mcg and bupivacaine 0.6 mg in a 24 hour period   Pump Reservoir Volume: 40 mL  Outside Clinic & Provider: [facility]   Last Refill Date: 12/04/2023  Next Refill Date: 1/16/2024  Low [place] Alarm Date: 1/20/2024  Pump : [employer]     Please consider consulting the pain team if the patient is admitted with an IT pump.       metoprolol succinate ER (TOPROL XL) 25 MG 24 hr tablet Take 1 tablet (25 mg) by mouth daily 90 tablet 0 12/22/2023 at am    MULTIVITAMINS OR TABS 1 tablet daily  0 12/22/2023 at am    pantoprazole (PROTONIX) 40 MG EC tablet Take 40 mg by mouth daily   12/22/2023 at am     Potassium Chloride MONICA Take 20 mEq by mouth daily   12/22/2023 at am    torsemide (DEMADEX) 10 MG tablet Take 10 mg by mouth daily   12/22/2023 at am    traZODone (DESYREL) 50 MG tablet Take 75 mg by mouth at bedtime   12/21/2023 at pm    chlorhexidine (PERIDEX) 0.12 % solution Swish and spit 15 mLs in mouth 2 times daily as needed   PRN    dicyclomine (BENTYL) 20 MG tablet Take 20 mg by mouth 3 times daily as needed   PRN    fluorometholone (FML LIQUIFILM) 0.1 % ophthalmic suspension Place 1 drop into both eyes as needed (dry eyes)   PRN    fluticasone (FLONASE) 50 MCG/ACT nasal spray SHAKE LIQUID WELL AND INHALE 2 SPRAYS INTO AFFECTED NOSTRILS TWICE DAILY AS NEEDED FOR RHINITIS   PRN    lidocaine (XYLOCAINE) 5 % external ointment APPLY TOPICALLY TO THE AFFECTED AREA THREE TIMES DAILY AS NEEDED   PRN    NARCAN 4 MG/0.1ML nasal spray once as needed   PRN    nitroGLYcerin (NITROSTAT) 0.4 MG sublingual tablet DISSOLVE 1 TABLET UNDER THE TONGUE AS NEEDED FOR CHEST PAIN EVERY 5 MINUTES AS NEEDED AS DIRECTED   PRN    ondansetron (ZOFRAN ODT) 4 MG ODT tab DISSOLVE 1 TABLET(4 MG) ON THE TONGUE EVERY 8 HOURS AS NEEDED FOR NAUSEA OR VOMITING   PRN    [DISCONTINUED] amoxicillin-clavulanate (AUGMENTIN) 875-125 MG tablet Take 1 tablet by mouth 2 times daily For 5 days   12/22/2023 at am    [DISCONTINUED] apixaban ANTICOAGULANT (ELIQUIS) 5 MG tablet Take 5 mg by mouth 2 times daily   12/22/2023 at am    [DISCONTINUED] aspirin 81 MG EC tablet Take 81 mg by mouth daily   12/22/2023 at am             Discharge Medications:     Current Discharge Medication List        CONTINUE these medications which have CHANGED    Details   amoxicillin-clavulanate (AUGMENTIN) 875-125 MG tablet Take 1 tablet by mouth 2 times daily for 5 days For 5 days  Qty: 10 tablet, Refills: 0    Associated Diagnoses: Epistaxis      apixaban ANTICOAGULANT (ELIQUIS) 5 MG tablet Take 1 tablet (5 mg) by mouth 2 times daily  Qty: 60 tablet, Refills: 1    Associated  Diagnoses: Paroxysmal supraventricular tachycardia           CONTINUE these medications which have NOT CHANGED    Details   allopurinol (ZYLOPRIM) 300 MG tablet TAKE 1 TABLET(300 MG) BY MOUTH EVERY DAY Strength: 300 mg      atorvastatin (LIPITOR) 40 MG tablet Take 40 mg by mouth daily      B Complex CAPS Take 1 tablet by mouth daily      buPROPion (WELLBUTRIN XL) 150 MG 24 hr tablet Take 150 mg by mouth every morning      busPIRone (BUSPAR) 7.5 MG tablet Take 7.5 mg by mouth 2 times daily      DULoxetine (CYMBALTA) 30 MG capsule Take 60 mg by mouth every morning      finasteride (PROSCAR) 5 MG tablet Take 1 tablet by mouth daily      FLOMAX 0.4 MG OR CP24 1 TABLET DAILY AFTER A MEAL  Qty: 30, Refills: 11      isosorbide mononitrate (IMDUR) 60 MG 24 hr tablet Take 60 mg by mouth daily      losartan (COZAAR) 100 MG tablet Take 0.5 tablets (50 mg) by mouth daily      medication given by implanted intrathecal pump continuous Drug # 1: Bupivacaine (Marcaine)  - Conc:25 mg/mL - Total Dose / 24 hours: 16.95 mg    Drug # 2: Hydromorphone (Dilaudid)  - Conc:0.3 mg/mL - Total Dose / 24 hours: 0.2 mg    Drug # 3: Fentanyl (Sublimaze) - Conc: 1667 mcg/mL - Total Dose / 24 hours: 1130 mcg    Diluent: NS    May give boluses every 2 hours for up to 5 boluses per day of hydromorphone at 0.1mg, fentanyl at 40 mcg and bupivacaine 0.6 mg in a 24 hour period   Pump Reservoir Volume: 40 mL  Outside Clinic & Provider: [facility]   Last Refill Date: 12/04/2023  Next Refill Date: 1/16/2024  Low [place] Alarm Date: 1/20/2024  Pump : [employer]     Please consider consulting the pain team if the patient is admitted with an IT pump.      metoprolol succinate ER (TOPROL XL) 25 MG 24 hr tablet Take 1 tablet (25 mg) by mouth daily  Qty: 90 tablet, Refills: 0    Associated Diagnoses: Paroxysmal supraventricular tachycardia      MULTIVITAMINS OR TABS 1 tablet daily  Refills: 0      pantoprazole (PROTONIX) 40 MG EC tablet Take 40  "mg by mouth daily      Potassium Chloride MONICA Take 20 mEq by mouth daily      torsemide (DEMADEX) 10 MG tablet Take 10 mg by mouth daily      traZODone (DESYREL) 50 MG tablet Take 75 mg by mouth at bedtime      chlorhexidine (PERIDEX) 0.12 % solution Swish and spit 15 mLs in mouth 2 times daily as needed      dicyclomine (BENTYL) 20 MG tablet Take 20 mg by mouth 3 times daily as needed      fluorometholone (FML LIQUIFILM) 0.1 % ophthalmic suspension Place 1 drop into both eyes as needed (dry eyes)      fluticasone (FLONASE) 50 MCG/ACT nasal spray SHAKE LIQUID WELL AND INHALE 2 SPRAYS INTO AFFECTED NOSTRILS TWICE DAILY AS NEEDED FOR RHINITIS      lidocaine (XYLOCAINE) 5 % external ointment APPLY TOPICALLY TO THE AFFECTED AREA THREE TIMES DAILY AS NEEDED      NARCAN 4 MG/0.1ML nasal spray once as needed      nitroGLYcerin (NITROSTAT) 0.4 MG sublingual tablet DISSOLVE 1 TABLET UNDER THE TONGUE AS NEEDED FOR CHEST PAIN EVERY 5 MINUTES AS NEEDED AS DIRECTED      ondansetron (ZOFRAN ODT) 4 MG ODT tab DISSOLVE 1 TABLET(4 MG) ON THE TONGUE EVERY 8 HOURS AS NEEDED FOR NAUSEA OR VOMITING           STOP taking these medications       aspirin 81 MG EC tablet Comments:   Reason for Stopping:                     Consultations:   ENT/ hemeonc  consultations were requested during this admission            Discharge Exam:   Blood pressure (!) 143/74, pulse 65, temperature 97.4  F (36.3  C), temperature source Oral, resp. rate 17, height 1.651 m (5' 5\"), weight 77.1 kg (170 lb), SpO2 97%.  GENERAL APPEARANCE: healthy, alert and no distress  EYES: conjunctiva clear, eyes grossly normal  HENT: external ears and nose normal   NECK: supple, no masses or adenopathy  RESP: lungs clear to auscultation - no rales, rhonchi or wheezes  CV: regular rate and rhythm, normal S1 S2, no S3 or S4 and no murmur, click or rub   ABDOMEN: soft, nontender, no HSM or masses and bowel sounds normal  MS: no clubbing, cyanosis; no edema  SKIN: clear without " significant rashes or lesions  NEURO: Normal strength and tone, sensory exam grossly normal, mentation intact and speech normal    Unresulted Labs Ordered in the Past 30 Days of this Admission       No orders found from 11/22/2023 to 12/23/2023.            Recent Results (from the past 24 hour(s))   US Lower Extremity Venous Duplex Left    Narrative    EXAM: US LOWER EXTREMITY VENOUS DUPLEX LEFT  LOCATION: [facility]  DATE: 12/23/2023    INDICATION: h o DVT  epistaxis now   COMPARISON: 12/15/2023  TECHNIQUE: Venous Duplex ultrasound of the left lower extremity with and without compression, augmentation and duplex. Color flow and spectral Doppler with waveform analysis performed.    FINDINGS: Exam includes the common femoral, femoral, popliteal, and contralateral common femoral veins as well as segmentally visualized deep calf veins and greater saphenous vein.     LEFT: Examination remains positive for DVT but there is been improvement compared to study of 2 weeks ago. Partially occlusive DVT remains within chest the popliteal vein. Low femoral vein and upper calf veins are patent. No extension of thrombus.   No superficial thrombophlebitis. No popliteal cyst.      Impression    IMPRESSION:  1.  Improving appearance of previously identified DVT. Localized partially occlusive thrombus remains present within popliteal left vein.            Pending Tests at Discharge:   None         Discharge Instructions and Follow-Up:     Discharge diet: Regular   Discharge activity: Activity as tolerated   Discharge follow-up: Follow up with primary care provider in 1-2 weeks  F/up ENT/ hemeonc as recommended           Discharge Disposition:     Discharged to home      Attestation:  I have reviewed today's vital signs, notes, medications, labs and imaging.    Time Spent on this Encounter   IOmar MD, personally saw the patient today and spent greater than 30 minutes discharging this patient.    Omar  Mckenzie GOMEZ

## 2023-12-24 NOTE — PROVIDER NOTIFICATION
MD pagemellissa regard discharge, patient and his wife do not feel comfortable with him going home today. They feel one more night is needed to be sure the nose bleed is under control.

## 2023-12-24 NOTE — PROGRESS NOTES
"  Hospital CM staff received notification via fax from [employer] ([place] Medicare [employer]) that patient has appealed discharge.     Case number: [id number]    Note: Case Status can be looked up at the [employer] website, [email or web address]/case_lookup     Per Discharge Appeals for Medicare Patient standard process,   _x_ Reviewed orders: pt indeed has discharge order placed (date) 12/24/23 at (time) 11:49am  _x_  Emailed the received \"(expedited) Appeal Request: Documentation Request\"  to [email or web address] (marked the email \"high priority\")  _x_ Called HIM - Release of Information priority line ([phone]) and left voicemail specifying appeal needs to be processed.    _x_ Reviewed CMS IM for Patient Signatures, which were given (date/s) 12/23/23 as noted in Epic   _x_ Obtained and completed \"Detailed Notice of Discharge.\"  Added pt name & MRN; reviewed this with patient at bedside (date) 12/24/23 at (time) 1515  _x_ Emailed via sli-rs-pamng copies of Detailed Notice of Discharge to [email or web address] (marked the email \"high priority\").  Notified HIM at [phone] that these can also be found on patient's paper chart.    _x_ Text-paged Dr. Rincon,\"FYI patient appealed discharge. DO NOT remove discharge order, unless medically necessary. OK to add things to your note/orders if needed.\" MD confirmed receipt of text page.  _x_ Alerted charge RN of the appeal (charge RN role is to alert nursing supervisor via text page)  _x_ Alerted case management manager via email  _x_ Added sticky note, \"discharge appeal in process\" on Expected Discharge Date      Ciara Torres RN, BS  Care Coordinator  [email or web address]  [facility]     Commonly Asked Questions    If a patient files for a Medicare appeal are those nights we are waiting for a response  covered by Medicare?  o Yes, Medicare covers the nights of inpatient hospital stay in the appeal process.    If a patient " stays overnight in the appeal process and is denied, do those nights count  towards a three night inpatient required stay for a TCU?  o No, the nights in appeal will not count towards a TCU stay.    Can a patient appeal more than once?  o Yes, but the additional appeal and hospital night stay if denied will NOT be covered  by Medicare and will be out of pocket expense to the patient.    What time of day does the patient need to be discharged by if the appeal is denied?  o The patient has until 12:00 noon the day after the [facility] decision has been made. If  the patient receives any inpatient services after noon of that day they will have to  pay for them.    Can patients with Medicare Advantage Plans appeal their discharge?  o Yes, all Medicare Advantage Plans have the right to the Medicare Appeal Process.    Can a family member appeal the discharge for a cognitively impaired patient?  o Yes    Does the patient/patient representative have to let the staff know they have appealed?  o No, at times it is possible for your first notice to come from [facility].    Should I document the appeal in the [facility] Medical record?  o Yes, document that the patient has filed a Medicare appeal for discharge,  information was provided to [facility] and results of the appeal are pending. When  results are received please document if the appeal was denied or accepted.

## 2023-12-24 NOTE — PROGRESS NOTES
MD Notification    Notified Person: MD    Notified Person Name:Magdi Cortes    Notification Date/Time: 12/23/23 1830    Notification Interaction: [employer] messaging    Purpose of Notification: 245. GB. Ultrasound results are complete. Impression: Improving appearance of previously identified DVT. Localized partially occlusive thrombus remains present within popliteal left vein.  Call back number Brenda BRUNO [phone]    Orders Received: Awaiting response

## 2023-12-24 NOTE — PLAN OF CARE
Goal Outcome Evaluation:    Pt anxious, impulsive. A1W preferred.  Pt C/0 back pain, fentanyl PRN avail next at 1140 PM. Trending Hgb. Last at 10.8. Next draw at 2200.Following MG and k+. WNL. Next draw in AM. Nose with brown, black dry and minimal oozing blood, MD direct to call when red kaz blood. Gave Eliquis dose tonight. Nurses to continue monitoring for bleeding. Myron lower extremities with scattered bruising.  Pt had small BM tonight. Requested Miralax, No PRN Miralax, handed off to Gen Surg nurse. Home medications with pharmacy. Gave report to Gen Surg at approximately 2135. Pt tx at 2140PM. No signs of active DVT on left leg, no pain, localized swelling or heat. Plan: Repeat US.

## 2023-12-25 VITALS
BODY MASS INDEX: 28.32 KG/M2 | DIASTOLIC BLOOD PRESSURE: 72 MMHG | RESPIRATION RATE: 16 BRPM | OXYGEN SATURATION: 95 % | HEIGHT: 65 IN | HEART RATE: 70 BPM | SYSTOLIC BLOOD PRESSURE: 139 MMHG | WEIGHT: 170 LBS | TEMPERATURE: 98.3 F

## 2023-12-25 PROCEDURE — 250N000013 HC RX MED GY IP 250 OP 250 PS 637: Performed by: INTERNAL MEDICINE

## 2023-12-25 PROCEDURE — 250N000011 HC RX IP 250 OP 636: Performed by: INTERNAL MEDICINE

## 2023-12-25 PROCEDURE — 250N000013 HC RX MED GY IP 250 OP 250 PS 637: Performed by: HOSPITALIST

## 2023-12-25 RX ORDER — POLYETHYLENE GLYCOL 3350 17 G/17G
17 POWDER, FOR SOLUTION ORAL ONCE
Status: DISCONTINUED | OUTPATIENT
Start: 2023-12-25 | End: 2023-12-25 | Stop reason: HOSPADM

## 2023-12-25 RX ADMIN — TORSEMIDE 10 MG: 10 TABLET ORAL at 10:24

## 2023-12-25 RX ADMIN — PANTOPRAZOLE SODIUM 40 MG: 40 TABLET, DELAYED RELEASE ORAL at 10:25

## 2023-12-25 RX ADMIN — DULOXETINE HYDROCHLORIDE 60 MG: 60 CAPSULE, DELAYED RELEASE ORAL at 10:24

## 2023-12-25 RX ADMIN — LOSARTAN POTASSIUM 50 MG: 50 TABLET, FILM COATED ORAL at 10:25

## 2023-12-25 RX ADMIN — METOPROLOL SUCCINATE 25 MG: 25 TABLET, EXTENDED RELEASE ORAL at 10:24

## 2023-12-25 RX ADMIN — BUPROPION HYDROCHLORIDE 150 MG: 150 TABLET, FILM COATED, EXTENDED RELEASE ORAL at 10:23

## 2023-12-25 RX ADMIN — BUSPIRONE HYDROCHLORIDE 7.5 MG: 5 TABLET ORAL at 10:25

## 2023-12-25 RX ADMIN — ATORVASTATIN CALCIUM 40 MG: 40 TABLET, FILM COATED ORAL at 10:23

## 2023-12-25 RX ADMIN — ALLOPURINOL 300 MG: 300 TABLET ORAL at 10:24

## 2023-12-25 RX ADMIN — AMOXICILLIN AND CLAVULANATE POTASSIUM 1 TABLET: 875; 125 TABLET, FILM COATED ORAL at 10:25

## 2023-12-25 RX ADMIN — APIXABAN 2.5 MG: 2.5 TABLET, FILM COATED ORAL at 10:23

## 2023-12-25 RX ADMIN — ISOSORBIDE MONONITRATE 60 MG: 60 TABLET, EXTENDED RELEASE ORAL at 10:24

## 2023-12-25 RX ADMIN — TAMSULOSIN HYDROCHLORIDE 0.4 MG: 0.4 CAPSULE ORAL at 10:24

## 2023-12-25 RX ADMIN — FINASTERIDE 5 MG: 5 TABLET, FILM COATED ORAL at 10:24

## 2023-12-25 RX ADMIN — FENTANYL CITRATE 19.5 MCG: 50 INJECTION, SOLUTION INTRAMUSCULAR; INTRAVENOUS at 10:19

## 2023-12-25 ASSESSMENT — ACTIVITIES OF DAILY LIVING (ADL)
ADLS_ACUITY_SCORE: 27
ADLS_ACUITY_SCORE: 26
ADLS_ACUITY_SCORE: 27

## 2023-12-25 NOTE — PLAN OF CARE
Goal Outcome Evaluation:    A&ox4, VSS on RA. Ax1 walker. Regular diet. Cooperative although at times anxious and impulsive, refused bed alarm, up independent in room.  PRN Fentanyl 19.5mcg given at 2300 for chronic lower back pain.  Left nostril packing with dry dark red/black minimal oozing, packing attaced to left cheek. VSS. Scattered bruising, scab to L/R shin.  Appealed discharge yesterday.

## 2023-12-25 NOTE — DISCHARGE SUMMARY
Orientation-AxOx4.    Vitals/Tele-VSS on room air. Lungs clear. Pain rated 7/10 in back, given fentanyl with some relief.    IV Access/drains-discontinued prior to discharge.    Diet-Regular-good appetite.    Mobility-Independent with walker in room, refuses alarms.    GI/-voids in bathroom, had BM prior to discharge. Asked for miralax but then didn't end up taking it.    Wound/Skin-intact except multiple bruises and scabs. +2 edema in LLE, +1 in RLE. Left nare with nasal packing, dried drainage with scant amount of brown drainage when touched with a tissue.     Consults-Hem/onc.     Discharge Plan-discharge instructions and medications reviewed with patient and his wife Arabella. Discharged with all personal belongings via wheelchair to private transportation home.      See Flow sheets for assessment

## 2023-12-25 NOTE — PLAN OF CARE
Date & Time: 12/24/23 0700-1930  Summary: Recurrent epistaxis with packing placed  Fall Risk: Yes, refuses bed alarm  Orientation:A&Ox4  ABNL VS/O2:VSS on RA  Pain Management:PRN Fentanyl for chronic back pain  Bowel/Bladder: Up to BR  Drains: PIV  SL  Diet:Reg  Activity Level: Up with walker  Anticipated  DC Date: Possible discharge to home tomorrow  Significant Information: ENT/Hem/Onc following

## 2023-12-25 NOTE — PROGRESS NOTES
Pt was discharged yesterday. He appealed it -stayed overnight but wants to go today.   Stable-no new changes. No bleeding nose last couple days.

## 2023-12-25 NOTE — PROGRESS NOTES
Care Management Follow Up    Length of Stay (days): 3    Expected Discharge Date: 12/25/2023     Concerns to be Addressed:       Patient plan of care discussed at interdisciplinary rounds: Yes    Anticipated Discharge Disposition:       Anticipated Discharge Services:    Anticipated Discharge DME:      Patient/family educated on Medicare website which has current facility and service quality ratings:    Education Provided on the Discharge Plan:    Patient/Family in Agreement with the Plan:      Referrals Placed by CM/SW:    Private pay costs discussed: Not applicable    Additional Information:  Writer received a VM from [facility] that the paperwork for this appeal was not received. Asked Ciara care coordinator to resend. Writer relayed VM message to CC on today.     JEREMY Solo

## 2023-12-25 NOTE — PROGRESS NOTES
Care Transitions Note:  Writer called HIM expedited line and spoke with [employer] regarding the [employer] appeal.  Yolis was looking in faxes rather than e mails for the appeal documentation.  Writer e mailed the very same documents that Ciara had emailed 12/24. Yolis confirmed email received and will process promptly

## 2023-12-26 ENCOUNTER — PATIENT OUTREACH (OUTPATIENT)
Dept: ONCOLOGY | Facility: CLINIC | Age: 78
End: 2023-12-26
Payer: MEDICARE

## 2023-12-26 NOTE — PROGRESS NOTES
Hematology referral reviewed for [facility], see below.    Referral reason: referral received from Dr Cortes who consulted while in hospital, recommended follow up as outpatient in 2-3 weeks for DVT    Referral received via: Internal referral by [facility] - Dr Cortes    Current abnormal labs: Available in Chart Review    Outreach: Call not placed to patient regarding referral.    Plan: Triage instructions updated and sent to NPS for completion.

## 2023-12-27 NOTE — PROGRESS NOTES
Notified that pt discharged on 12/25.  Writer used case number on [facility]'s appeal website to check status of appeal - [id number].  [facility].

## 2024-02-07 NOTE — TELEPHONE ENCOUNTER
RECORDS STATUS - ALL OTHER DIAGNOSIS      RECORDS RECEIVED FROM: Justo Lawrence   DATE RECEIVED: 2/8   NOTES STATUS DETAILS   OFFICE NOTE from medical oncologist JO ANN - Justo Dickerson: 4/11/23   DISCHARGE SUMMARY from hospital Epic/JO ANN Many   DISCHARGE REPORT from the ER Epic/JO ANN Many   MEDICATION LIST Epic/JO ANN - Justo    LABS     ANYTHING RELATED TO DIAGNOSIS Epic 12/24/23   IMAGING (NEED IMAGES & REPORT)     ULTRASOUND PACS 5/15/17 - 12/15/23: Justo

## 2024-02-26 ENCOUNTER — HOSPITAL ENCOUNTER (EMERGENCY)
Facility: CLINIC | Age: 79
Discharge: HOME | DRG: 543 | End: 2024-02-26
Attending: STUDENT IN AN ORGANIZED HEALTH CARE EDUCATION/TRAINING PROGRAM | Admitting: STUDENT IN AN ORGANIZED HEALTH CARE EDUCATION/TRAINING PROGRAM
Payer: MEDICARE

## 2024-02-26 ENCOUNTER — PRE VISIT (OUTPATIENT)
Dept: ONCOLOGY | Facility: CLINIC | Age: 79
End: 2024-02-26
Payer: MEDICARE

## 2024-02-26 ENCOUNTER — APPOINTMENT (OUTPATIENT)
Dept: CT IMAGING | Facility: CLINIC | Age: 79
DRG: 543 | End: 2024-02-26
Attending: STUDENT IN AN ORGANIZED HEALTH CARE EDUCATION/TRAINING PROGRAM
Payer: MEDICARE

## 2024-02-26 ENCOUNTER — APPOINTMENT (OUTPATIENT)
Dept: GENERAL RADIOLOGY | Facility: CLINIC | Age: 79
DRG: 543 | End: 2024-02-26
Attending: STUDENT IN AN ORGANIZED HEALTH CARE EDUCATION/TRAINING PROGRAM
Payer: MEDICARE

## 2024-02-26 ENCOUNTER — TELEPHONE (OUTPATIENT)
Dept: ONCOLOGY | Facility: CLINIC | Age: 79
End: 2024-02-26
Payer: MEDICARE

## 2024-02-26 VITALS
TEMPERATURE: 98.4 F | RESPIRATION RATE: 12 BRPM | SYSTOLIC BLOOD PRESSURE: 128 MMHG | DIASTOLIC BLOOD PRESSURE: 82 MMHG | HEART RATE: 88 BPM | OXYGEN SATURATION: 99 %

## 2024-02-26 DIAGNOSIS — S02.2XXA CLOSED FRACTURE OF NASAL BONE, INITIAL ENCOUNTER: ICD-10-CM

## 2024-02-26 DIAGNOSIS — M25.512 ACUTE PAIN OF LEFT SHOULDER: ICD-10-CM
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
## 2024-02-26 DIAGNOSIS — S00.81XA FACIAL ABRASION, INITIAL ENCOUNTER: ICD-10-CM

## 2024-02-26 PROCEDURE — 99284 EMERGENCY DEPT VISIT MOD MDM: CPT | Mod: 25

## 2024-02-26 PROCEDURE — 73030 X-RAY EXAM OF SHOULDER: CPT | Mod: LT

## 2024-02-26 PROCEDURE — 250N000013 HC RX MED GY IP 250 OP 250 PS 637: Performed by: STUDENT IN AN ORGANIZED HEALTH CARE EDUCATION/TRAINING PROGRAM

## 2024-02-26 PROCEDURE — 70450 CT HEAD/BRAIN W/O DYE: CPT | Mod: ME

## 2024-02-26 RX ORDER — ACETAMINOPHEN 500 MG
1000 TABLET ORAL ONCE
Status: COMPLETED | OUTPATIENT
Start: 2024-02-26 | End: 2024-02-26

## 2024-02-26 RX ADMIN — ACETAMINOPHEN 1000 MG: 500 TABLET, FILM COATED ORAL at 10:40

## 2024-02-26 ASSESSMENT — COLUMBIA-SUICIDE SEVERITY RATING SCALE - C-SSRS
6. HAVE YOU EVER DONE ANYTHING, STARTED TO DO ANYTHING, OR PREPARED TO DO ANYTHING TO END YOUR LIFE?: NO
2. HAVE YOU ACTUALLY HAD ANY THOUGHTS OF KILLING YOURSELF IN THE PAST MONTH?: NO
1. IN THE PAST MONTH, HAVE YOU WISHED YOU WERE DEAD OR WISHED YOU COULD GO TO SLEEP AND NOT WAKE UP?: NO

## 2024-02-26 ASSESSMENT — ACTIVITIES OF DAILY LIVING (ADL)
ADLS_ACUITY_SCORE: 38
ADLS_ACUITY_SCORE: 38
ADLS_ACUITY_SCORE: 37
ADLS_ACUITY_SCORE: 38

## 2024-02-26 NOTE — ED TRIAGE NOTES
Pt here today for blood draw and chemo     Triage Assessment (Adult)       Row Name 02/26/24 0916 02/26/24 0911       Triage Assessment    Airway WDL WDL --       Respiratory WDL    Respiratory WDL WDL WDL       Skin Circulation/Temperature WDL    Skin Circulation/Temperature WDL WDL WDL       Cardiac WDL    Cardiac WDL WDL WDL       Peripheral/Neurovascular WDL    Peripheral Neurovascular WDL WDL WDL       Cognitive/Neuro/Behavioral WDL    Cognitive/Neuro/Behavioral WDL WDL WDL

## 2024-02-26 NOTE — ED PROVIDER NOTES
History     Chief Complaint:  Fall       The history is provided by the patient.      Eren James is a 78 year old male with history of coronary artery disease, congestive heart failure, COPD, DVT on Eliquis, type 2 diabetes mellitus, hypertension, and hyperlipidemia who presents following a fall today. He was here for a blood draw today. Reports he was reaching for something in the back seat of his car, and his foot slipped beneath him, causing him to fall out of his chair onto his back. He was then able to stand up, but slipped again when trying to reach something by extending his cane out and fell forward, obtaining a wound to his nose. Reports left shoulder pain, acute on chronic. Denies feeling dizzy or lightheaded prior to either fall. Denies neck pain, numbness, or tingling. Denies injury to teeth.  No chest pain, shortness of breath, or abdominal pain.  No back pain or neck pain.      Independent Historian:    None - Patient Only    Review of External Notes:  Reviewed [facility] records for Tetanus status. Last Tdap on record was given on 2018.    Allergies:  Armodafinil     Physical Exam   Patient Vitals for the past 24 hrs:   BP Temp Temp src Pulse Resp SpO2   02/26/24 0949 (!) 183/105 -- -- 80 26 98 %   02/26/24 0915 (!) 156/87 98.4  F (36.9  C) Oral 70 16 98 %        Physical Exam  GENERAL: Patient well-appearing. There is some dried blood on his shirt.  HEAD: No benz sign, raccoon eyes or CSF leak  Eyes: Anicteric  NOSE: No active bleeding. Abrasion over the bridge of his nose.  Dried blood in right nare.  No active bleeding.  MOUTH: Moist mucosa  THROAT: Patent airway. Pharynx clear.   Neck: No rigidity  Back: No midline spinal tenderness, crepitus or gross deformity  CV: RRR, no murmurs rubs or gallops  PULM: CTAB with good aeration; no retractions, rales, rhonchi, or wheezing  ABD: Soft, nontender, nondistended, no guarding, no peritoneal signs, no bruising  DERM: Abrasion left shin.  Skin warm and  dry  EXTREMITY: Moving all extremities without difficulty. Swelling and tenderness of left shoulder.  No open wound.  Pelvis: Stable  VASCULAR: Symmetric pulses bilaterally   Neuro: Sensation intact to light touch bilateral upper extremities.  Left shoulder abduction strength limited by pain.  Can slightly abduct the arm.  Left hand  strength 5 out of 5.  Left elbow flexion/extension 5 out of 5 strength.  Emergency Department Course     Laboratory: Imaging:   Labs Ordered and Resulted from Time of ED Arrival to Time of ED Departure - No data to display  XR Shoulder Left G/E 3 Views   Preliminary Result   IMPRESSION: Mild osteoarthrosis of the AC joint. There is also   probably an os acromiale. No fracture. No subluxation or dislocation.      Head CT w/o contrast   Final Result   IMPRESSION:      1. No evidence of acute intracranial hemorrhage, mass, or herniation.   2. Age-indeterminate left nasal bone fracture. Given history and   minimal overlying soft tissue edema, this may be acute.         BEATRICE OLGUIN MD            SYSTEM ID:  [id number]              Emergency Department Course & Assessments:       Interventions:  Medications   acetaminophen (TYLENOL) tablet 1,000 mg (1,000 mg Oral $Given 2/26/24 1040)        Assessments, Independent Interpretation, Consult/Discussion of ManagementTests:  ED Course as of 02/26/24 1123   Mon Feb 26, 2024   0928 I obtained the history and examined the patient as noted above.    1044 CT head shows no ICH. XR reveals no fracture.   1044 I rechecked and updated the patient regarding imaging results.       Social Determinants of Health affecting care:  None    Disposition:  The patient was discharged to home.     Impression & Plan    CMS Diagnoses: None    Code Status: Prior    Medical Decision Making:  Patient presenting after mechanical fall.      Chronic conditions complicating -on Eliquis-increased risk of bleeding.    DDx considered, but not limited to fracture,  intracranial bleed, syncope, however evaluation not consistent with these or other ominous etiologies.    CT head only notable for nasal bone fracture but no intracranial bleed.  No C-spine tenderness.  Ranging neck without difficulty.  No focal sensory deficit.  Do not think he requires neck imaging.    Consider labs, but not clinically indicated -patient had no preceding symptoms and felt well prior to the mechanical fall.    X-ray left shoulder no fracture or dislocation.  He does have limitations in abduction of the shoulder.  There may be a rotator cuff injury.  Therefore I placed patient in a sling.  I gave patient information for [facility] orthopedic and recommend he contact them for follow-up for repeat evaluation and potentially outpatient MRI.    Patient has a nasal abrasion and abrasion on the left knee that were cleaned out and bandage.  No indication for suture repair.    Patient was evaluated for acute medical emergencies. Based on my clinical assessment, I do not think any further acute management or work-up is required.  Patient stable for discharge. Given strict return precautions. All questions answered. Patient content with plan. Recommended PCP follow-up in 2-3 days.      Critical Care:  None.    Diagnosis:    ICD-10-CM    1. Fall, initial encounter  W19.XXXA       2. Closed fracture of nasal bone, initial encounter  S02.2XXA       3. Facial abrasion, initial encounter  S00.81XA       4. Acute pain of left shoulder  M25.512            Discharge Medications:  New Prescriptions    No medications on file      Scribe Disclosure:  I, Lauren Stark, am serving as a scribe at 9:29 AM on 2/26/2024 to document services personally performed by Roni Fontanez MD based on my observations and the provider's statements to me.    2/26/2024   Roni Fontanez MD Foss, Kevin, MD  02/26/24 1125

## 2024-02-26 NOTE — ED NOTES
Pt placed on primofit for urinary incontinence and frequency. Pt voided x5 with urinal since arrival.

## 2024-02-26 NOTE — TELEPHONE ENCOUNTER
Pt's wife is calling.  States that pt missed his appt with Dr Cortes today due to the fact that he fell and was seen in the ER.    Pt's wife states that pt is now back home and is doing better.  They will call back to reschedule his missed appt.    Will route update to care team.    Emmie Mendes RN on 2/26/2024 at 4:31 PM

## 2024-02-26 NOTE — ED TRIAGE NOTES
Pt was getting something out of backseat and fell backwards, then got up with help and fell forwards.     Triage Assessment (Adult)       Row Name 02/26/24 0916 02/26/24 0911       Triage Assessment    Airway WDL WDL --       Respiratory WDL    Respiratory WDL WDL WDL       Skin Circulation/Temperature WDL    Skin Circulation/Temperature WDL WDL WDL       Cardiac WDL    Cardiac WDL WDL WDL       Peripheral/Neurovascular WDL    Peripheral Neurovascular WDL WDL WDL       Cognitive/Neuro/Behavioral WDL    Cognitive/Neuro/Behavioral WDL WDL WDL

## 2024-02-26 NOTE — ED NOTES
Pt attempting to call wife to pick him up. No answer x2. Offered pt WC transport home due to inability to drive with sling and needing assistance with transferring, but pt does not have keys to get into his house.

## 2024-02-26 NOTE — DISCHARGE INSTRUCTIONS
Return to the emergency department if symptoms are worsening, become concerning, or for any other concerns.  Contact [facility] within the next week to schedule follow-up for your left shoulder.

## 2024-02-26 NOTE — ED TRIAGE NOTES
Pt on eliquis. Pt fell forward today and hit head. Pt has laceration on bridge on nose. Pt fell a few days ago and hurt left shoulder.      Triage Assessment (Adult)       Row Name 02/26/24 0916 02/26/24 0911       Triage Assessment    Airway WDL WDL --       Respiratory WDL    Respiratory WDL WDL WDL       Skin Circulation/Temperature WDL    Skin Circulation/Temperature WDL WDL WDL       Cardiac WDL    Cardiac WDL WDL WDL       Peripheral/Neurovascular WDL    Peripheral Neurovascular WDL WDL WDL       Cognitive/Neuro/Behavioral WDL    Cognitive/Neuro/Behavioral WDL WDL WDL

## 2024-02-28 ENCOUNTER — APPOINTMENT (OUTPATIENT)
Dept: CT IMAGING | Facility: CLINIC | Age: 79
DRG: 543 | End: 2024-02-28
Attending: EMERGENCY MEDICINE
Payer: MEDICARE

## 2024-02-28 ENCOUNTER — APPOINTMENT (OUTPATIENT)
Dept: GENERAL RADIOLOGY | Facility: CLINIC | Age: 79
DRG: 543 | End: 2024-02-28
Attending: EMERGENCY MEDICINE
Payer: MEDICARE

## 2024-02-28 ENCOUNTER — HOSPITAL ENCOUNTER (INPATIENT)
Facility: CLINIC | Age: 79
LOS: 4 days | Discharge: NURSING, REHAB OR LONG-TERM CARE | DRG: 543 | End: 2024-03-03
Attending: EMERGENCY MEDICINE | Admitting: INTERNAL MEDICINE
Payer: MEDICARE

## 2024-02-28 ENCOUNTER — APPOINTMENT (OUTPATIENT)
Dept: CT IMAGING | Facility: CLINIC | Age: 79
DRG: 543 | End: 2024-02-28
Attending: INTERNAL MEDICINE
Payer: MEDICARE

## 2024-02-28 DIAGNOSIS — S80.811A ABRASION OF RIGHT LEG, INITIAL ENCOUNTER: ICD-10-CM

## 2024-02-28 DIAGNOSIS — S40.012A: ICD-10-CM

## 2024-02-28 DIAGNOSIS — S20.212A CHEST WALL HEMATOMA, LEFT, INITIAL ENCOUNTER: ICD-10-CM

## 2024-02-28 DIAGNOSIS — S80.812A LEG ABRASION, LEFT, INITIAL ENCOUNTER: ICD-10-CM
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
## 2024-02-28 DIAGNOSIS — S32.010A CLOSED COMPRESSION FRACTURE OF L1 VERTEBRA, INITIAL ENCOUNTER (H): ICD-10-CM

## 2024-02-28 DIAGNOSIS — S40.812A ABRASION OF MULTIPLE SITES OF LEFT UPPER ARM, INITIAL ENCOUNTER: ICD-10-CM

## 2024-02-28 DIAGNOSIS — R74.8 ELEVATED CK: ICD-10-CM

## 2024-02-28 DIAGNOSIS — R29.6 RECURRENT FALLS: Primary | ICD-10-CM

## 2024-02-28 LAB
ABO/RH(D): NORMAL
ALBUMIN SERPL BCG-MCNC: 3.6 G/DL (ref 3.5–5.2)
ALBUMIN UR-MCNC: NEGATIVE MG/DL
ALP SERPL-CCNC: 138 U/L (ref 40–150)
ALT SERPL W P-5'-P-CCNC: 26 U/L (ref 0–70)
ANION GAP SERPL CALCULATED.3IONS-SCNC: 16 MMOL/L (ref 7–15)
ANTIBODY SCREEN: NEGATIVE
APPEARANCE UR: CLEAR
APTT PPP: 47 SECONDS (ref 22–38)
AST SERPL W P-5'-P-CCNC: 42 U/L (ref 0–45)
ATRIAL RATE - MUSE: 142 BPM
BACTERIA #/AREA URNS HPF: ABNORMAL /HPF
BASOPHILS # BLD AUTO: 0 10E3/UL (ref 0–0.2)
BASOPHILS NFR BLD AUTO: 0 %
BILIRUB SERPL-MCNC: 0.7 MG/DL
BILIRUB UR QL STRIP: NEGATIVE
BUN SERPL-MCNC: 21.6 MG/DL (ref 8–23)
CALCIUM SERPL-MCNC: 9 MG/DL (ref 8.8–10.2)
CHLORIDE SERPL-SCNC: 94 MMOL/L (ref 98–107)
CK SERPL-CCNC: 645 U/L (ref 39–308)
COLOR UR AUTO: ABNORMAL
CREAT SERPL-MCNC: 1.11 MG/DL (ref 0.67–1.17)
DEPRECATED HCO3 PLAS-SCNC: 25 MMOL/L (ref 22–29)
DIASTOLIC BLOOD PRESSURE - MUSE: NORMAL MMHG
EGFRCR SERPLBLD CKD-EPI 2021: 67 ML/MIN/1.73M2
EOSINOPHIL # BLD AUTO: 0 10E3/UL (ref 0–0.7)
EOSINOPHIL NFR BLD AUTO: 0 %
ERYTHROCYTE [DISTWIDTH] IN BLOOD BY AUTOMATED COUNT: 12.5 % (ref 10–15)
ETHANOL SERPL-MCNC: <0.01 G/DL
GLUCOSE SERPL-MCNC: 138 MG/DL (ref 70–99)
GLUCOSE UR STRIP-MCNC: NEGATIVE MG/DL
HCT VFR BLD AUTO: 29.9 % (ref 40–53)
HGB BLD-MCNC: 10.2 G/DL (ref 13.3–17.7)
HGB UR QL STRIP: NEGATIVE
IMM GRANULOCYTES # BLD: 0.1 10E3/UL
IMM GRANULOCYTES NFR BLD: 1 %
INR PPP: 1.87 (ref 0.85–1.15)
INTERPRETATION ECG - MUSE: NORMAL
KETONES UR STRIP-MCNC: NEGATIVE MG/DL
LEUKOCYTE ESTERASE UR QL STRIP: ABNORMAL
LYMPHOCYTES # BLD AUTO: 0.9 10E3/UL (ref 0.8–5.3)
LYMPHOCYTES NFR BLD AUTO: 10 %
MCH RBC QN AUTO: 30.8 PG (ref 26.5–33)
MCHC RBC AUTO-ENTMCNC: 34.1 G/DL (ref 31.5–36.5)
MCV RBC AUTO: 90 FL (ref 78–100)
MONOCYTES # BLD AUTO: 1 10E3/UL (ref 0–1.3)
MONOCYTES NFR BLD AUTO: 11 %
MUCOUS THREADS #/AREA URNS LPF: PRESENT /LPF
NEUTROPHILS # BLD AUTO: 7.1 10E3/UL (ref 1.6–8.3)
NEUTROPHILS NFR BLD AUTO: 78 %
NITRATE UR QL: NEGATIVE
NRBC # BLD AUTO: 0 10E3/UL
NRBC BLD AUTO-RTO: 0 /100
P AXIS - MUSE: 49 DEGREES
PH UR STRIP: 7.5 [PH] (ref 5–7)
PLATELET # BLD AUTO: 384 10E3/UL (ref 150–450)
POTASSIUM SERPL-SCNC: 4.5 MMOL/L (ref 3.4–5.3)
PR INTERVAL - MUSE: 216 MS
PROT SERPL-MCNC: 6.6 G/DL (ref 6.4–8.3)
QRS DURATION - MUSE: 90 MS
QT - MUSE: 334 MS
QTC - MUSE: 513 MS
R AXIS - MUSE: 68 DEGREES
RBC # BLD AUTO: 3.31 10E6/UL (ref 4.4–5.9)
RBC URINE: 1 /HPF
SODIUM SERPL-SCNC: 135 MMOL/L (ref 135–145)
SP GR UR STRIP: 1.01 (ref 1–1.03)
SPECIMEN EXPIRATION DATE: NORMAL
SQUAMOUS EPITHELIAL: <1 /HPF
SYSTOLIC BLOOD PRESSURE - MUSE: NORMAL MMHG
T AXIS - MUSE: 35 DEGREES
UROBILINOGEN UR STRIP-MCNC: NORMAL MG/DL
VENTRICULAR RATE- MUSE: 142 BPM
WBC # BLD AUTO: 9 10E3/UL (ref 4–11)
WBC URINE: 1 /HPF

## 2024-02-28 PROCEDURE — 73090 X-RAY EXAM OF FOREARM: CPT | Mod: LT

## 2024-02-28 PROCEDURE — 120N000001 HC R&B MED SURG/OB

## 2024-02-28 PROCEDURE — 82077 ASSAY SPEC XCP UR&BREATH IA: CPT | Performed by: EMERGENCY MEDICINE

## 2024-02-28 PROCEDURE — 250N000011 HC RX IP 250 OP 636: Performed by: EMERGENCY MEDICINE

## 2024-02-28 PROCEDURE — 72131 CT LUMBAR SPINE W/O DYE: CPT | Mod: ME

## 2024-02-28 PROCEDURE — 71260 CT THORAX DX C+: CPT | Mod: MG

## 2024-02-28 PROCEDURE — 258N000003 HC RX IP 258 OP 636: Performed by: EMERGENCY MEDICINE

## 2024-02-28 PROCEDURE — 82550 ASSAY OF CK (CPK): CPT | Performed by: EMERGENCY MEDICINE

## 2024-02-28 PROCEDURE — 70486 CT MAXILLOFACIAL W/O DYE: CPT | Mod: MG

## 2024-02-28 PROCEDURE — 70450 CT HEAD/BRAIN W/O DYE: CPT | Mod: MG,77

## 2024-02-28 PROCEDURE — G1010 CDSM STANSON: HCPCS

## 2024-02-28 PROCEDURE — 250N000009 HC RX 250: Performed by: EMERGENCY MEDICINE

## 2024-02-28 PROCEDURE — 93005 ELECTROCARDIOGRAM TRACING: CPT

## 2024-02-28 PROCEDURE — 250N000013 HC RX MED GY IP 250 OP 250 PS 637: Performed by: EMERGENCY MEDICINE

## 2024-02-28 PROCEDURE — 36415 COLL VENOUS BLD VENIPUNCTURE: CPT | Performed by: EMERGENCY MEDICINE

## 2024-02-28 PROCEDURE — 70450 CT HEAD/BRAIN W/O DYE: CPT | Mod: ME

## 2024-02-28 PROCEDURE — 250N000013 HC RX MED GY IP 250 OP 250 PS 637: Performed by: INTERNAL MEDICINE

## 2024-02-28 PROCEDURE — 85610 PROTHROMBIN TIME: CPT | Performed by: EMERGENCY MEDICINE

## 2024-02-28 PROCEDURE — 73060 X-RAY EXAM OF HUMERUS: CPT | Mod: LT

## 2024-02-28 PROCEDURE — 99285 EMERGENCY DEPT VISIT HI MDM: CPT | Mod: 25

## 2024-02-28 PROCEDURE — 85025 COMPLETE CBC W/AUTO DIFF WBC: CPT | Performed by: EMERGENCY MEDICINE

## 2024-02-28 PROCEDURE — 81001 URINALYSIS AUTO W/SCOPE: CPT | Performed by: EMERGENCY MEDICINE

## 2024-02-28 PROCEDURE — 96361 HYDRATE IV INFUSION ADD-ON: CPT

## 2024-02-28 PROCEDURE — 80053 COMPREHEN METABOLIC PANEL: CPT | Performed by: EMERGENCY MEDICINE

## 2024-02-28 PROCEDURE — 250N000011 HC RX IP 250 OP 636: Performed by: INTERNAL MEDICINE

## 2024-02-28 PROCEDURE — 96376 TX/PRO/DX INJ SAME DRUG ADON: CPT

## 2024-02-28 PROCEDURE — 99223 1ST HOSP IP/OBS HIGH 75: CPT | Mod: AI | Performed by: INTERNAL MEDICINE

## 2024-02-28 PROCEDURE — 86900 BLOOD TYPING SEROLOGIC ABO: CPT | Performed by: EMERGENCY MEDICINE

## 2024-02-28 PROCEDURE — 85730 THROMBOPLASTIN TIME PARTIAL: CPT | Performed by: EMERGENCY MEDICINE

## 2024-02-28 PROCEDURE — 96374 THER/PROPH/DIAG INJ IV PUSH: CPT

## 2024-02-28 RX ORDER — LOSARTAN POTASSIUM 50 MG/1
50 TABLET ORAL DAILY
Status: DISCONTINUED | OUTPATIENT
Start: 2024-02-29 | End: 2024-03-03 | Stop reason: HOSPADM

## 2024-02-28 RX ORDER — TAMSULOSIN HYDROCHLORIDE 0.4 MG/1
0.4 CAPSULE ORAL DAILY
Status: DISCONTINUED | OUTPATIENT
Start: 2024-02-29 | End: 2024-03-03 | Stop reason: HOSPADM

## 2024-02-28 RX ORDER — DULOXETIN HYDROCHLORIDE 60 MG/1
60 CAPSULE, DELAYED RELEASE ORAL EVERY MORNING
Status: DISCONTINUED | OUTPATIENT
Start: 2024-02-29 | End: 2024-03-03 | Stop reason: HOSPADM

## 2024-02-28 RX ORDER — ISOSORBIDE MONONITRATE 30 MG/1
90 TABLET, EXTENDED RELEASE ORAL DAILY
Status: DISCONTINUED | OUTPATIENT
Start: 2024-02-29 | End: 2024-03-03 | Stop reason: HOSPADM

## 2024-02-28 RX ORDER — LIDOCAINE 4 G/G
1 PATCH TOPICAL
Status: DISCONTINUED | OUTPATIENT
Start: 2024-02-28 | End: 2024-03-03 | Stop reason: HOSPADM

## 2024-02-28 RX ORDER — HYDROMORPHONE HYDROCHLORIDE 1 MG/ML
0.5 INJECTION, SOLUTION INTRAMUSCULAR; INTRAVENOUS; SUBCUTANEOUS ONCE
Status: COMPLETED | OUTPATIENT
Start: 2024-02-28 | End: 2024-02-28

## 2024-02-28 RX ORDER — NALOXONE HYDROCHLORIDE 0.4 MG/ML
0.2 INJECTION, SOLUTION INTRAMUSCULAR; INTRAVENOUS; SUBCUTANEOUS
Status: DISCONTINUED | OUTPATIENT
Start: 2024-02-28 | End: 2024-03-03 | Stop reason: HOSPADM

## 2024-02-28 RX ORDER — LIDOCAINE 40 MG/G
CREAM TOPICAL
Status: DISCONTINUED | OUTPATIENT
Start: 2024-02-28 | End: 2024-03-03 | Stop reason: HOSPADM

## 2024-02-28 RX ORDER — ONDANSETRON 2 MG/ML
4 INJECTION INTRAMUSCULAR; INTRAVENOUS EVERY 6 HOURS PRN
Status: DISCONTINUED | OUTPATIENT
Start: 2024-02-28 | End: 2024-03-03 | Stop reason: HOSPADM

## 2024-02-28 RX ORDER — ACETAMINOPHEN 500 MG
1000 TABLET ORAL ONCE
Status: COMPLETED | OUTPATIENT
Start: 2024-02-28 | End: 2024-02-28

## 2024-02-28 RX ORDER — ONDANSETRON 2 MG/ML
4 INJECTION INTRAMUSCULAR; INTRAVENOUS EVERY 30 MIN PRN
Status: DISCONTINUED | OUTPATIENT
Start: 2024-02-28 | End: 2024-02-28

## 2024-02-28 RX ORDER — OXYCODONE HYDROCHLORIDE 5 MG/1
5 TABLET ORAL EVERY 4 HOURS PRN
Status: DISCONTINUED | OUTPATIENT
Start: 2024-02-28 | End: 2024-03-01

## 2024-02-28 RX ORDER — TORSEMIDE 20 MG/1
20 TABLET ORAL DAILY
Status: DISCONTINUED | OUTPATIENT
Start: 2024-02-29 | End: 2024-03-03 | Stop reason: HOSPADM

## 2024-02-28 RX ORDER — ATORVASTATIN CALCIUM 40 MG/1
40 TABLET, FILM COATED ORAL DAILY
Status: DISCONTINUED | OUTPATIENT
Start: 2024-02-28 | End: 2024-03-03 | Stop reason: HOSPADM

## 2024-02-28 RX ORDER — BUPROPION HYDROCHLORIDE 150 MG/1
300 TABLET ORAL EVERY MORNING
Status: DISCONTINUED | OUTPATIENT
Start: 2024-02-29 | End: 2024-03-03 | Stop reason: HOSPADM

## 2024-02-28 RX ORDER — BUSPIRONE HYDROCHLORIDE 10 MG/1
10 TABLET ORAL 3 TIMES DAILY
Status: DISCONTINUED | OUTPATIENT
Start: 2024-02-28 | End: 2024-03-03 | Stop reason: HOSPADM

## 2024-02-28 RX ORDER — FENTANYL CITRATE 50 UG/ML
50 INJECTION, SOLUTION INTRAMUSCULAR; INTRAVENOUS
Status: DISCONTINUED | OUTPATIENT
Start: 2024-02-28 | End: 2024-02-28

## 2024-02-28 RX ORDER — HYDROMORPHONE HCL IN WATER/PF 6 MG/30 ML
.2-.3 PATIENT CONTROLLED ANALGESIA SYRINGE INTRAVENOUS
Status: DISCONTINUED | OUTPATIENT
Start: 2024-02-28 | End: 2024-02-28

## 2024-02-28 RX ORDER — HYDROMORPHONE HYDROCHLORIDE 1 MG/ML
0.5 INJECTION, SOLUTION INTRAMUSCULAR; INTRAVENOUS; SUBCUTANEOUS EVERY 30 MIN PRN
Status: DISCONTINUED | OUTPATIENT
Start: 2024-02-28 | End: 2024-02-28

## 2024-02-28 RX ORDER — METHOCARBAMOL 750 MG/1
750 TABLET, FILM COATED ORAL 4 TIMES DAILY PRN
Status: DISCONTINUED | OUTPATIENT
Start: 2024-02-28 | End: 2024-03-03 | Stop reason: HOSPADM

## 2024-02-28 RX ORDER — DEXTROAMPHETAMINE SACCHARATE, AMPHETAMINE ASPARTATE, DEXTROAMPHETAMINE SULFATE AND AMPHETAMINE SULFATE 5; 5; 5; 5 MG/1; MG/1; MG/1; MG/1
20 TABLET ORAL DAILY
Status: DISCONTINUED | OUTPATIENT
Start: 2024-02-29 | End: 2024-03-03 | Stop reason: HOSPADM

## 2024-02-28 RX ORDER — POTASSIUM CHLORIDE 750 MG/1
20 TABLET, EXTENDED RELEASE ORAL 2 TIMES DAILY
Status: DISCONTINUED | OUTPATIENT
Start: 2024-02-28 | End: 2024-03-03 | Stop reason: HOSPADM

## 2024-02-28 RX ORDER — HYDROMORPHONE HYDROCHLORIDE 1 MG/ML
0.5 INJECTION, SOLUTION INTRAMUSCULAR; INTRAVENOUS; SUBCUTANEOUS
Status: DISCONTINUED | OUTPATIENT
Start: 2024-02-28 | End: 2024-03-01

## 2024-02-28 RX ORDER — FINASTERIDE 5 MG/1
5 TABLET, FILM COATED ORAL DAILY
Status: DISCONTINUED | OUTPATIENT
Start: 2024-02-28 | End: 2024-03-03 | Stop reason: HOSPADM

## 2024-02-28 RX ORDER — DEXTROAMPHETAMINE SACCHARATE, AMPHETAMINE ASPARTATE, DEXTROAMPHETAMINE SULFATE AND AMPHETAMINE SULFATE 5; 5; 5; 5 MG/1; MG/1; MG/1; MG/1
20 TABLET ORAL DAILY
Status: ON HOLD | COMMUNITY
End: 2024-03-03

## 2024-02-28 RX ORDER — AMOXICILLIN 250 MG
2 CAPSULE ORAL 2 TIMES DAILY PRN
Status: DISCONTINUED | OUTPATIENT
Start: 2024-02-28 | End: 2024-03-03 | Stop reason: HOSPADM

## 2024-02-28 RX ORDER — IOPAMIDOL 755 MG/ML
500 INJECTION, SOLUTION INTRAVASCULAR ONCE
Status: COMPLETED | OUTPATIENT
Start: 2024-02-28 | End: 2024-02-28

## 2024-02-28 RX ORDER — AMOXICILLIN 250 MG
1 CAPSULE ORAL 2 TIMES DAILY PRN
Status: DISCONTINUED | OUTPATIENT
Start: 2024-02-28 | End: 2024-03-03 | Stop reason: HOSPADM

## 2024-02-28 RX ORDER — ACETAMINOPHEN 325 MG/1
975 TABLET ORAL 3 TIMES DAILY
Status: DISCONTINUED | OUTPATIENT
Start: 2024-02-28 | End: 2024-03-03 | Stop reason: HOSPADM

## 2024-02-28 RX ORDER — NALOXONE HYDROCHLORIDE 0.4 MG/ML
0.4 INJECTION, SOLUTION INTRAMUSCULAR; INTRAVENOUS; SUBCUTANEOUS
Status: DISCONTINUED | OUTPATIENT
Start: 2024-02-28 | End: 2024-03-03 | Stop reason: HOSPADM

## 2024-02-28 RX ORDER — METOPROLOL SUCCINATE 25 MG/1
25 TABLET, EXTENDED RELEASE ORAL DAILY
Status: DISCONTINUED | OUTPATIENT
Start: 2024-02-29 | End: 2024-03-03 | Stop reason: HOSPADM

## 2024-02-28 RX ORDER — ALLOPURINOL 300 MG/1
300 TABLET ORAL DAILY
Status: DISCONTINUED | OUTPATIENT
Start: 2024-02-29 | End: 2024-03-03 | Stop reason: HOSPADM

## 2024-02-28 RX ORDER — ONDANSETRON 4 MG/1
4 TABLET, ORALLY DISINTEGRATING ORAL EVERY 6 HOURS PRN
Status: DISCONTINUED | OUTPATIENT
Start: 2024-02-28 | End: 2024-03-03 | Stop reason: HOSPADM

## 2024-02-28 RX ORDER — CALCIUM CARBONATE 500 MG/1
1000 TABLET, CHEWABLE ORAL 4 TIMES DAILY PRN
Status: DISCONTINUED | OUTPATIENT
Start: 2024-02-28 | End: 2024-03-03 | Stop reason: HOSPADM

## 2024-02-28 RX ADMIN — IOPAMIDOL 86 ML: 755 INJECTION, SOLUTION INTRAVENOUS at 13:44

## 2024-02-28 RX ADMIN — SODIUM CHLORIDE 100 ML: 9 INJECTION, SOLUTION INTRAVENOUS at 13:44

## 2024-02-28 RX ADMIN — ACETAMINOPHEN 1000 MG: 500 TABLET ORAL at 15:21

## 2024-02-28 RX ADMIN — FENTANYL CITRATE 50 MCG: 50 INJECTION, SOLUTION INTRAMUSCULAR; INTRAVENOUS at 14:32

## 2024-02-28 RX ADMIN — HYDROMORPHONE HYDROCHLORIDE 0.5 MG: 1 INJECTION, SOLUTION INTRAMUSCULAR; INTRAVENOUS; SUBCUTANEOUS at 16:24

## 2024-02-28 RX ADMIN — SODIUM CHLORIDE 500 ML: 9 INJECTION, SOLUTION INTRAVENOUS at 12:40

## 2024-02-28 RX ADMIN — METHOCARBAMOL 750 MG: 750 TABLET ORAL at 20:23

## 2024-02-28 RX ADMIN — FENTANYL CITRATE 50 MCG: 50 INJECTION, SOLUTION INTRAMUSCULAR; INTRAVENOUS at 12:44

## 2024-02-28 RX ADMIN — HYDROMORPHONE HYDROCHLORIDE 0.5 MG: 1 INJECTION, SOLUTION INTRAMUSCULAR; INTRAVENOUS; SUBCUTANEOUS at 15:20

## 2024-02-28 ASSESSMENT — ACTIVITIES OF DAILY LIVING (ADL)
ADLS_ACUITY_SCORE: 37
ADLS_ACUITY_SCORE: 40
ADLS_ACUITY_SCORE: 37
ADLS_ACUITY_SCORE: 37
ADLS_ACUITY_SCORE: 38
ADLS_ACUITY_SCORE: 37

## 2024-02-28 ASSESSMENT — COLUMBIA-SUICIDE SEVERITY RATING SCALE - C-SSRS
1. IN THE PAST MONTH, HAVE YOU WISHED YOU WERE DEAD OR WISHED YOU COULD GO TO SLEEP AND NOT WAKE UP?: NO
2. HAVE YOU ACTUALLY HAD ANY THOUGHTS OF KILLING YOURSELF IN THE PAST MONTH?: NO
6. HAVE YOU EVER DONE ANYTHING, STARTED TO DO ANYTHING, OR PREPARED TO DO ANYTHING TO END YOUR LIFE?: NO

## 2024-02-28 NOTE — ED TRIAGE NOTES
Pt BIBA [facility] pt tripped over a rug in his entry way left shoulder pain and large hematoma noted. CMS intact.  Pt is on eliquis.  Pt has bruises in multiple stages of healing and multiple small abrasions, Right elbow right and left leg.  EMS gave 50mcg fentanyl and 20g right AC      Triage Assessment (Adult)       Row Name 02/28/24 1220          Triage Assessment    Airway WDL WDL        Respiratory WDL    Respiratory WDL WDL        Skin Circulation/Temperature WDL    Skin Circulation/Temperature WDL X;all  bruises        Peripheral/Neurovascular WDL    Peripheral Neurovascular WDL WDL        Cognitive/Neuro/Behavioral WDL    Cognitive/Neuro/Behavioral WDL WDL

## 2024-02-28 NOTE — ED NOTES
Wife expressed concern for patient returning home after falls and interest in TCU for him. Told patient will notify MD. Pt will most likely had PT consult and [facility].

## 2024-02-28 NOTE — ED TRIAGE NOTES
Pt came in after fall. Hx of neuropathy, DVT in L calf. Pt fell going to [facility] to take him to MD appointment. Pt is very bruised and sore on L shoulder/wrist. Pt has 20 g in R AC     Triage Assessment (Adult)       Row Name 02/28/24 1221 02/28/24 1220       Triage Assessment    Airway WDL WDL WDL       Respiratory WDL    Respiratory WDL WDL WDL       Skin Circulation/Temperature WDL    Skin Circulation/Temperature WDL WDL X;all  bruises       Cardiac WDL    Cardiac WDL X;rhythm --    Pulse Rate & Regularity tachycardic --    Cardiac Rhythm ST --       Peripheral/Neurovascular WDL    Peripheral Neurovascular WDL X;neurovascular assessment upper WDL       Cognitive/Neuro/Behavioral WDL    Cognitive/Neuro/Behavioral WDL WDL WDL       LUE Neurovascular Assessment    Temperature LUE cool --    Color LUE no discoloration --    Sensation LUE numbness present  baseline --       RUE Neurovascular Assessment    Temperature RUE cool --    Color RUE no discoloration --    Sensation RUE numbness present  baseline --

## 2024-02-28 NOTE — H&P
[facility]  History and Physical  Hospitalist - Artie Jessica DO       Date of Admission:  2/28/2024    Chief Complaint   Recurrent falls    History is obtained from the patient, the patient's wife Tg via phone, the emergency department physician, as well as electronic medical record.    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  Much of the history is obtained from the patient's wife Tg via phone given the patient's anxiety in the emergency department.  The patient's wife notes that the patient has been falling nearly on a daily basis recently.  He went to [facility] to get some blood drawn on 2/26 but fell while getting out of the car in the parking lot.  He was taken to the emergency department at that time and discharged home after negative workup.  She reports he has fallen several times since then.  Yesterday he saw his pain doctor regarding his pain pump and it is working appropriately.  Today the patient needed to go to a doctor's appointment so his wife called an [employer] for him.  While getting into the car he fell and was brought to the emergency department for evaluation.  Currently, the patient is quite tachypneic and tachycardic and quite anxious.  He reports 1 bandlike tightness around his abdomen as well as left shoulder and left upper quadrant abdominal pain.  The patient has been compliant with his medications.  Currently, he lives at home with his wife.  His wife is noted his falling to be an issue and recently took his keys away and is not allowing him to drive, which the patient is okay with.  She also acknowledges that  he is too much care for her at home.  She has been pushing him to use a cane or a walker but he has not been compliant with this.  She does note that she has observed some memory issues recently.  She notes that the patient is quite intelligent, though even some of his friends who he plays cards with regularly have noticed some memory issues over the past 6 months.    In the emergency department, the patient was found to have a temperature of 98.3  F, heart rate 129, blood pressure 140/95, respiratory rate 20, SpO2 100% on room air.  Initial lab work showed chloride 94, , glucose 138, hemoglobin 10.2, INR 1.87.  Urinalysis was negative for signs of infection.  Several CTs and x-rays revealed a suspected acute L1 compression fracture, and left shoulder hematoma.  The patient was provided with pain control.  Orthopedic surgery and neurosurgery were consulted to see the patient.  Physical therapy and Occupational Therapy were consulted to see the patient.  It was discussed with the patient as well as his wife that given his recurrent falls he is a high risk for bleed and at this point the risk of being on a blood thinner is outweighing the benefit, especially in light of the recent lower extremity ultrasound showing improvement in his DVT.  He was recommended to discontinue the Eliquis, both the patient and his wife were agreeable.  We discussed he could be considered for an IVC filter in the future if these problems recur.  We also discussed the possibility of TCU or even moving to a higher level of care like an assisted living facility.  Patient's wife has been discussing this and is open to TCU.  We also discussed the concern for mild cognitive impairment versus early dementia.  It was recommended to follow-up with a neurologist in the outpatient setting for formal evaluation of this.  Patient's wife expressed understanding and was agreeable.  Neurology referral was placed.    ASSESSMENT/PLAN    Recurrent  Falls  L Shoulder Hematoma  L1 Compression Fracture  Multiple Ecchymoses on the Body  - Appreciate Neurosurgery recommendations  - Appreciate Orthopedic Surgery recommendations  - Pain control  - Appreciate Pharmacy assistance with resumption of pain pump  - PT/OT - anticipate pt will need TCU.  Pt's wife is agreeable to TCU if recommended by PT or OT    Recurrent DVTs  - Discussed with pt and wife that given his hematoma we are holding his eliquis.  Also discussed that given his recurrent falls the risk of bleeding and high fall risk outweighs the benefit of eliquis.  It was recommended that eliquis be discontinued going forward.  His most recent LE venous doppler on 2/9/2024 showed no evidence of DVT.  If DVT recurs could consider IVC filter, which was discussed with the patient and his wife.  Both were agreeable.    Polypharmacy  Memory Loss  Concern for Mild Cognitive Impairment vs Dementia  - Discussed with wife Tg via phone.  Pt's wife and his friends who he plays cards with for many years have noticed some memory issues over the last 6 months.  We discussed that the patient would benefit from a referral to a Neuropsychiatrist for formal evaluation for dementia.  Pt's wife was agreeable.  Referral placed.    Social Issues  - Pt currently lives at home with his wife.  They have some stairs in the home.  She reports with his recurrent falls he requires too much care and she is unable to provide it.  She has been encouraging him to use a cane or walker lately, but he has been noncompliant.  She expressed that she has been looking in to other living arrangements.  We discussed that he would benefit from ILF or detention given his high needs.  She expressed understanding    Chronic Pain with Opiate Dependence  Polypharmacy  - Would benefit from reduction of his pain meds and simplification of his medication regimen  ADDENDUM:  Med rec complete showing adderall.  Given his tachycardia and anxiety, will hold adderall in  AM.    Chronic Medical Problems:  COPD  HFpEF  Coronary Artery Disease  Hypertension  Hyperlipidemia  BPH    PLAN: Resume home medications as appropriate once confirmed by pharmacy.     DVT Prophylaxis: Pneumatic Compression Devices  Code Status: Full Code  Discharge Plan:    Expected Discharge Date: 03/01/2024                 Artie Jessica DO  Securely message with Vocera (more info)  Text page via [employer]     Primary Care Physician   Jakob Conner MD    -----------------------------------------------------------------------------------------------------------------------------------------------------------------------------------------------------    Past Medical History    I have reviewed this patient's medical history and updated it with pertinent information if needed.   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     See [facility]    ASD (atrial septal defect)     Asthma     BPH (benign prostatic hyperplasia)     CHF (congestive heart failure)     Cholelithiasis     Chronic neck pain     COPD (chronic obstructive pulmonary disease) (H)     Coronary artery disease     Previous coronary angioplasties    Deep vein thrombosis     Depressive disorder     Diverticulitis of colon     Esophageal reflux     Gout     Hyperlipidemia     Hypertension     Hypertrophy of prostate with urinary obstruction and other lower urinary tract symptoms (LUTS)     Kidney stone     Subdural hematoma after fall     SVT (supraventricular tachycardia)     Testicular hypofunction        Past Surgical History   I have reviewed this patient's surgical history and updated it with pertinent information if needed.  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 Taking?   B Complex CAPS  Self Yes No   Sig: Take 1 tablet by mouth daily   DULoxetine (CYMBALTA) 30 MG capsule  Self Yes No   Sig: Take 60 mg by mouth every morning   FLOMAX 0.4 MG OR CP24  Self No No   Si TABLET DAILY AFTER A MEAL   MULTIVITAMINS OR TABS  Self Yes No   Si tablet daily   NARCAN 4 MG/0.1ML nasal spray  Self Yes No   Sig: once as needed   Potassium Chloride MONICA  Self Yes No   Sig: Take 20 mEq by mouth daily   allopurinol (ZYLOPRIM) 300 MG tablet  Self Yes No   Sig: TAKE 1 TABLET(300 MG) BY MOUTH EVERY DAY Strength: 300 mg   apixaban ANTICOAGULANT (ELIQUIS) 5 MG tablet   No No   Sig: Take 1 tablet (5 mg) by mouth 2 times daily   atorvastatin (LIPITOR) 40 MG tablet  Self Yes No   Sig: Take 40 mg by mouth daily   buPROPion (WELLBUTRIN XL) 150 MG 24 hr tablet  Self Yes No   Sig: Take 150 mg by mouth every morning   busPIRone (BUSPAR) 7.5 MG tablet   Yes No   Sig: Take 7.5 mg by mouth 2 times daily   chlorhexidine (PERIDEX) 0.12 % solution  Self Yes No   Sig: Swish and spit 15 mLs in mouth 2 times daily as needed   dicyclomine (BENTYL) 20 MG tablet  Self Yes No   Sig: Take 20 mg by mouth 3 times daily as needed   finasteride (PROSCAR) 5 MG tablet  Self Yes No   Sig: Take 1 tablet by mouth daily   fluorometholone (FML LIQUIFILM) 0.1 % ophthalmic suspension  Self Yes No   Sig: Place 1 drop into both eyes as needed (dry eyes)   fluticasone (FLONASE) 50 MCG/ACT nasal spray  Self Yes No   Sig: SHAKE LIQUID WELL AND INHALE 2 SPRAYS INTO AFFECTED NOSTRILS TWICE DAILY AS NEEDED FOR RHINITIS   isosorbide mononitrate (IMDUR) 60 MG 24 hr tablet  Self Yes No   Sig: Take 60 mg by mouth daily   lidocaine (XYLOCAINE) 5 % external ointment  Self Yes No   Sig: APPLY  TOPICALLY TO THE AFFECTED AREA THREE TIMES DAILY AS NEEDED   losartan (COZAAR) 100 MG tablet   Yes No   Sig: Take 0.5 tablets (50 mg) by mouth daily   medication given by implanted intrathecal pump   Yes Yes   Sig: continuous Drug # 1: Bupivacaine (Marcaine)  - Conc:25 mg/mL - Total Dose / 24 hours: 17.52 mg    Drug # 2: Hydromorphone (Dilaudid)  - Conc:0.8 mg/mL - Total Dose / 24 hours: 0.56 mg    Drug # 3: Fentanyl (Sublimaze) - Conc: 1817.5 mcg/mL - Total Dose / 24 hours: 1273.9 mcg    Diluent: NS    May give boluses every 2 hours for up to 3 boluses per day of hydromorphone at 0.1982mg, fentanyl at 45 mcg and bupivacaine 0.619 mg in a 24 hour period   Pump Reservoir Volume: 40 mL  Outside Clinic & Provider: [facility]   Last Refill Date: 2/27/24  Next Refill Date: 4/16/2024  [place] Alarm Date: 1/20/2024  Pump : [facility]Room     Please consider consulting the pain team if the patient is admitted with an IT pump.   metoprolol succinate ER (TOPROL XL) 25 MG 24 hr tablet   No No   Sig: Take 1 tablet (25 mg) by mouth daily   nitroGLYcerin (NITROSTAT) 0.4 MG sublingual tablet  Self Yes No   Sig: DISSOLVE 1 TABLET UNDER THE TONGUE AS NEEDED FOR CHEST PAIN EVERY 5 MINUTES AS NEEDED AS DIRECTED   ondansetron (ZOFRAN ODT) 4 MG ODT tab  Self Yes No   Sig: DISSOLVE 1 TABLET(4 MG) ON THE TONGUE EVERY 8 HOURS AS NEEDED FOR NAUSEA OR VOMITING   pantoprazole (PROTONIX) 40 MG EC tablet  Self Yes No   Sig: Take 40 mg by mouth daily   torsemide (DEMADEX) 10 MG tablet  Self Yes No   Sig: Take 10 mg by mouth daily   traZODone (DESYREL) 50 MG tablet  Self Yes No   Sig: Take 75 mg by mouth at bedtime      Facility-Administered Medications: None     Allergies   Allergies   Allergen Reactions    Armodafinil Other (See Comments)       Social History   I have reviewed this patient's social history and updated it with pertinent information if needed. Eren James  reports that he has never smoked. He has never used  smokeless tobacco. He reports that he does not drink alcohol and does not use drugs.    Family History   I have reviewed this patient's family history and updated it with pertinent information if needed.   Family History   Problem Relation Age of Onset    Cardiovascular Mother         living. hx bypass.    Cardiovascular Father         .cardiomegaly.    Diabetes Sister                -----------------------------------------------------------------------------------------------------------------------------------------------------------------------------------------------------    Review of Systems   The 10 point Review of Systems is negative other than noted in the HPI or here.     Physical Exam   Temp: 98.3  F (36.8  C) Temp src: Oral BP: (!) 135/101 Pulse: (!) 129   Resp: 16 SpO2: 100 % O2 Device: None (Room air)    Vital Signs with Ranges  Temp:  [98.3  F (36.8  C)] 98.3  F (36.8  C)  Pulse:  [129] 129  Resp:  [16-20] 16  BP: (135-140)/() 135/101  SpO2:  [100 %] 100 %  173 lbs 11.2 oz    Constitutional: Awake, alert, cooperative, no apparent distress.  Eyes: ecchymosis around R eye.  HEENT: Moist mucous membranes, normal dentition.  Respiratory: Clear to auscultation bilaterally, no crackles or wheezing.  Cardiovascular: Regular rate and rhythm, normal S1 and S2, and no murmur noted.  GI: Soft, non-distended, non-tender, normal bowel sounds.  Lymph/Hematologic: No anterior cervical or supraclavicular adenopathy.  Skin: Multiple ecchymoses scattered around body, large L shoulder ecchymosis  Musculoskeletal: No joint swelling, erythema or tenderness.  Neurologic: Cranial nerves 2-12 intact, normal strength and sensation.  Psychiatric: Alert, oriented to person, place and time, no obvious anxiety or depression.     Data     Recent Labs   Lab 24  1214   WBC 9.0   HGB 10.2*   MCV 90      INR 1.87*      POTASSIUM 4.5   CHLORIDE 94*   CO2 25   BUN 21.6   CR 1.11   ANIONGAP 16*    SHAUN 9.0   *   ALBUMIN 3.6   PROTTOTAL 6.6   BILITOTAL 0.7   ALKPHOS 138   ALT 26   AST 42       Recent Results (from the past 24 hour(s))   CT Head w/o Contrast    Narrative    EXAM: CT HEAD W/O CONTRAST  2/28/2024 2:01 PM     HISTORY:  fall, blunt trauma, head injury       COMPARISON:  CT 2/26/2024    TECHNIQUE: Using multidetector thin collimation helical acquisition  technique, axial, coronal and sagittal CT images from the skull base  to the vertex were obtained without intravenous contrast.   (topogram) image(s) also obtained and reviewed. Dose reduction  techniques were performed.    FINDINGS:  No intracranial hemorrhage, mass effect, or midline shift. No acute  loss of gray-white matter differentiation in the cerebral hemispheres.  Ventricles are proportionate to the cerebral sulci. Clear basal  cisterns. Moderate diffuse cerebral volume loss. Mild periventricular  hypoattenuation, consistent with chronic small vessel ischemic  disease.    The bony calvaria and the bones of the skull base are normal. The  visualized portions of the paranasal sinuses and mastoid air cells are  clear. Grossly normal orbits. Age-indeterminate left nasal bone  fracture.      Impression    IMPRESSION:   1. No acute intracranial pathology.   2. Moderate diffuse cerebral volume loss and mild chronic small vessel  ischemic disease.  3. Age-indeterminate left nasal fracture.    BARBIE MG MD         SYSTEM ID:  [id number]   CT Thoracic Spine w/o Contrast    Narrative    THORACIC AND LUMBAR SPINE CT WITHOUT CONTRAST  2/28/2024 2:01 PM    HISTORY: Fall, trauma; evaluate for injury.    COMPARISON: CT chest, abdomen and pelvis 12/4/2023.    TECHNIQUE: Axial, coronal, and sagittal multiplanar reconstructions  obtained from acquisition of thoracic and lumbar spine CT scan.    FINDINGS:  Thoracic spine: There is no acute fracture or subluxation. There is no  prevertebral edema.     Mild stepwise anterolisthesis T1-T3.  Exaggeration of thoracic  kyphosis.    Multilevel degenerative changes with loss of disc height, osteophyte  formation and facet hypertrophy. Bony overgrowth on the right at  T12-L1 results in moderate neural foraminal stenosis. No high-grade  spinal canal or neural foraminal stenosis in the remainder of the  thoracic spine.    No soft tissue abnormality in the visualized paraspinous tissues  anteriorly.    Spinal stimulator enters the spinal canal from L1-L2 interspace with  the tip at mid T7 level.    Lumbar Spine: There are five type lumbar vertebrae, used for the  purposes of this dictation.     There is new compression fracture of L1 with mild anterior vertebral  body height loss.    Right convex scoliosis of the lumbar spine with the apex at L1. There  is complete anterior bony fusion at L4-L5 and partial anterior bony  fusion at L2-L3. Complete dorsolateral bony fusion on the left at  L2-L3.    Multilevel disc height narrowing, disc degeneration, osteophyte  formation and facet hypertrophy. On a level by level basis, the  findings are as follows:    L1-L2:  Left eccentric disc osteophyte complex and facet hypertrophy.  Mild right and moderate left neural foraminal stenosis. No spinal  canal stenosis.    L2-L3:  Posterior osteophyte formation and bilateral facet  hypertrophy. Mild bilateral neural foraminal stenosis. Mild spinal  canal stenosis.    L3-L4:  Disc bulge, osteophyte formation and bilateral facet  hypertrophy. Moderate to severe right and mild left neural foraminal  stenosis. No spinal canal stenosis.    L4-L5:  Posterior osteophyte formation and bilateral facet  hypertrophy. Mild to moderate right and mild left neural foraminal  stenosis. No spinal canal stenosis.    L5-S1:  Disc osteophyte complex and bilateral facet hypertrophy.  Severe bilateral neural foraminal stenosis. Mild spinal canal  stenosis.    The visualized paraspinous tissues anteriorly are unremarkable.      Impression     IMPRESSION:   1. Thoracic spine:   a. No acute fracture or subluxation.  b. Multilevel spondylosis without high-grade spinal canal stenosis.  Moderate to severe right neural foraminal stenosis at T12-L1 secondary  to bone overgrowth.      2. Lumbar Spine:   a. Compression fracture of L1 with mild anterior vertebral body height  loss, new compared to 12/4/2023 and likely acute. No retropulsion of  the fractured vertebral body.  b. Multilevel lumbar spondylosis as above.    BARBIE MG MD         SYSTEM ID:  [id number]   CT Lumbar Spine w/o Contrast    Narrative    THORACIC AND LUMBAR SPINE CT WITHOUT CONTRAST  2/28/2024 2:01 PM    HISTORY: Fall, trauma; evaluate for injury.    COMPARISON: CT chest, abdomen and pelvis 12/4/2023.    TECHNIQUE: Axial, coronal, and sagittal multiplanar reconstructions  obtained from acquisition of thoracic and lumbar spine CT scan.    FINDINGS:  Thoracic spine: There is no acute fracture or subluxation. There is no  prevertebral edema.     Mild stepwise anterolisthesis T1-T3. Exaggeration of thoracic  kyphosis.    Multilevel degenerative changes with loss of disc height, osteophyte  formation and facet hypertrophy. Bony overgrowth on the right at  T12-L1 results in moderate neural foraminal stenosis. No high-grade  spinal canal or neural foraminal stenosis in the remainder of the  thoracic spine.    No soft tissue abnormality in the visualized paraspinous tissues  anteriorly.    Spinal stimulator enters the spinal canal from L1-L2 interspace with  the tip at mid T7 level.    Lumbar Spine: There are five type lumbar vertebrae, used for the  purposes of this dictation.     There is new compression fracture of L1 with mild anterior vertebral  body height loss.    Right convex scoliosis of the lumbar spine with the apex at L1. There  is complete anterior bony fusion at L4-L5 and partial anterior bony  fusion at L2-L3. Complete dorsolateral bony fusion on the left at  L2-L3.    Multilevel  disc height narrowing, disc degeneration, osteophyte  formation and facet hypertrophy. On a level by level basis, the  findings are as follows:    L1-L2:  Left eccentric disc osteophyte complex and facet hypertrophy.  Mild right and moderate left neural foraminal stenosis. No spinal  canal stenosis.    L2-L3:  Posterior osteophyte formation and bilateral facet  hypertrophy. Mild bilateral neural foraminal stenosis. Mild spinal  canal stenosis.    L3-L4:  Disc bulge, osteophyte formation and bilateral facet  hypertrophy. Moderate to severe right and mild left neural foraminal  stenosis. No spinal canal stenosis.    L4-L5:  Posterior osteophyte formation and bilateral facet  hypertrophy. Mild to moderate right and mild left neural foraminal  stenosis. No spinal canal stenosis.    L5-S1:  Disc osteophyte complex and bilateral facet hypertrophy.  Severe bilateral neural foraminal stenosis. Mild spinal canal  stenosis.    The visualized paraspinous tissues anteriorly are unremarkable.      Impression    IMPRESSION:   1. Thoracic spine:   a. No acute fracture or subluxation.  b. Multilevel spondylosis without high-grade spinal canal stenosis.  Moderate to severe right neural foraminal stenosis at T12-L1 secondary  to bone overgrowth.      2. Lumbar Spine:   a. Compression fracture of L1 with mild anterior vertebral body height  loss, new compared to 12/4/2023 and likely acute. No retropulsion of  the fractured vertebral body.  b. Multilevel lumbar spondylosis as above.    BARBIE MG MD         SYSTEM ID:  [id number]   CT Chest/Abdomen/Pelvis w Contrast    Narrative    CT CHEST/ABDOMEN/PELVIS WITH CONTRAST 2/28/2024 2:12 PM    CLINICAL HISTORY: Fall, trauma, evaluate for injury.    TECHNIQUE: CT scan of the chest, abdomen, and pelvis was performed  following injection of IV contrast. Multiplanar reformats were  obtained. Dose reduction techniques were used.     CONTRAST: 86mL [id number]    COMPARISON: CT chest,  abdomen and pelvis 12/4/2023.    FINDINGS:   LUNGS AND PLEURA: Curvilinear atelectasis or scarring at the right  upper lobe appears stable. A few nodular opacities at right upper lobe  appears stable including a solid 8 mm nodule series 5 image 87. There  are other stable small nodules in this region. No new airspace  disease. Calcified granulomas. No effusion or pneumothorax.    MEDIASTINUM/AXILLAE: No acute mediastinal abnormality. No mediastinal  free fluid or adenopathy identified. Right chest pacemaker.    CORONARY ARTERY CALCIFICATION: Previous intervention (stents or CABG).    HEPATOBILIARY: Normal.    PANCREAS: Normal.    SPLEEN: Several calcified granulomas of the spleen. No acute splenic  abnormality.    ADRENAL GLANDS: Normal.    KIDNEYS/BLADDER: No significant mass, stones, or hydronephrosis. There  are simple or benign cysts. No follow up is needed. Bladder  unremarkable.    BOWEL: No bowel obstruction. No acute inflammatory change. Moderate  stool within the colon. A few colonic diverticula without  diverticulitis.    PELVIC ORGANS: No acute pelvic abnormality.    ADDITIONAL FINDINGS: Scattered vascular calcifications. No suspicious  lymph nodes.    MUSCULOSKELETAL: Sternotomy. Diffuse degenerative changes of the  spine. Posterior lumbar spine postoperative changes. Subacute healed  bilateral rib fractures with callus. No convincing acute displaced  fracture identified. However, there is the new finding of ill-defined  increased density fluid surrounding the left shoulder joint suggestive  of blood products, series 4 image 7-24. This is difficult to measure  but some of the hematoma appears to be within the shoulder joint or  within the muscles surrounding the left shoulder joint. Some  subcutaneous edema of the left lateral chest wall extending inferiorly  to the left upper abdomen.      Impression    IMPRESSION:  1.  Increased density material involving the left shoulder joint  worrisome for acute  hematoma. This is ill-defined but appears to at  least partially extend to the left shoulder joint region and may be  intramuscular involving the muscle surrounding the left shoulder.  2.  No visible acute displaced fracture identified. Please note that  the structures of the left shoulder are incompletely included for  imaging assessment on this exam.   3.  No other acute abnormality identified.  4.  Stable pulmonary nodular opacities on the right.  5.  Subcutaneous edema at the left lateral chest wall extending to the  left upper abdomen level.       Recommendations for an incidental lung nodule = or > 6mm to 8mm:    Low risk patients: Initial follow-up CT at 6-12 months, then  consider CT at 18-24 months if no change.    High risk patients: Initial follow-up CT at 6-12 months, then CT at  18-24 months if no change.    *Low Risk: Minimal or absent history of smoking or other known risk  factors.  *Nonsolid (ground-glass) or partly solid nodules may require longer  follow-up to exclude indolent adenocarcinoma.  *Recommendations based on Guidelines for the Management of Incidental  Pulmonary Nodules Detected at CT: From the Fleischner Society 2017,  Radiology 2017.    AUEDLIA STILES MD         SYSTEM ID:  [id number]   CT Facial Bones without Contrast    Narrative    CT FACIAL BONES WITHOUT CONTRAST 2/28/2024 2:15 PM    History:  fall, trauma, eval for injury    Comparison:  Head CT 2/26/2024      Technique: Using thin collimation multidetector helical acquisition  technique, axial and coronal thin section CT images were reconstructed  through the facial bones. Images were reviewed in bone and soft tissue  windows.    Findings:    Again demonstrated is displaced age indeterminate nasal fracture on  the left. No new fracture. The cribriform plate appears intact.  Alignment of the facial bones appears normal.     There is no hematoma, soft tissue mass or gas visualized within the  orbits. Scattered paranasal sinus mucosal  thickening.      Impression    Impression: Again demonstrated is displaced age indeterminate nasal  fracture on the left. No new fracture.     BARBIE MG MD         SYSTEM ID:  [id number]   CT Cervical Spine w/o Contrast    Narrative    EXAM: CT CERVICAL SPINE W/O CONTRAST  2/28/2024 2:15 PM     HISTORY: fall, head injury, eval for trauma       COMPARISON: No prior similar studies    TECHNIQUE: Using multidetector thin collimation helical acquisition  technique, axial, coronal and sagittal CT images through the cervical  spine were obtained without intravenous contrast. Dose reduction  techniques were used.    FINDINGS:  No acute fracture or traumatic subluxation. No prevertebral edema.    Mild anterolisthesis at C4-5 and C7-T1 and mild retrolisthesis at  C6-7.    Partial anterior bony fusion C2-C6. Near complete dorsolateral bony  fusion on the left C2-T1 and on the right C2-C5 and C7-T1.    The findings on a level by level basis are as follows:    C2-C3: Bilateral facet arthropathy. No significant spinal canal or  neural foraminal stenosis.     C3-C4: Right eccentric uncovertebral spurring and facet hypertrophy.  Mild to moderate right neural foraminal stenosis. No spinal canal or  left neural foraminal stenosis.    C4-C5: Left eccentric uncovertebral spurring and facet hypertrophy.  Moderate left neural foraminal stenosis. No spinal canal or right  neural frontal stenosis.    C5-C6: Left eccentric uncovertebral spurring and bilateral facet  hypertrophy. Mild left neural foraminal stenosis. No spinal canal or  right neural foraminal stenosis.    C6-C7: Right eccentric uncovertebral spurring and bilateral facet  hypertrophy. Moderate right and mild to moderate left neural foraminal  stenosis. No spinal canal stenosis.    C7-T1: No significant spinal canal or neural foraminal stenosis.      Impression    IMPRESSION:  1. No acute fracture or posttraumatic subluxation.  2. Multilevel cervical spondylosis as  detailed above.     BARBIE MG MD         SYSTEM ID:  [id number]   Humerus XR,  G/E 2 views, left    Narrative    HUMERUS LEFT TWO OR MORE VIEWS   2/28/2024 2:17 PM     HISTORY: Fall, pain.    COMPARISON: None.      Impression    IMPRESSION: The humerus appears normal. There is no evidence of  fracture. There is an os acromiale and there are hypertrophic changes  at the pseudoarthrosis between the os acromiale and the acromion  proper. Mild osteoarthrosis of the AC joint. Otherwise negative.    TANISHA BARRERA MD         SYSTEM ID:  [id number]   Radius/Ulna XR,  PA &LAT, left    Narrative    FOREARM LEFT 2 VIEWS   2/28/2024 2:17 PM     HISTORY: Fall, arm pain.    COMPARISON: None.      Impression    IMPRESSION: Arterial calcifications. The radius and ulna appear  normal. Positive ulnar variance. 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 There are also degenerative changes at the STT joint and  first CMC joint. There is also chondrocalcinosis in the wrist.    TANISHA BARRERA MD         SYSTEM ID:  [id number]

## 2024-02-28 NOTE — ED PROVIDER NOTES
History     Chief Complaint:  Fall     HPI   Eren James is a 78 year old male with history of CAD s/p coronary stents placed, CHF, type II diabetes mellitus, hyperlipidemia, HTN, and DVT anticoagulated on Eliquis who presents to the ED via EMS for evaluation after an unwitnessed fall today. EMS reports that the patient was home alone and was running late for a microvascular surgery appointment on the left arm. He then went out to his [employer] and the [employer]  said they were not going to take him. Upon returning back inside, he tripped over a rug in his entryway and fell to the ground. He attempted to stand up but in doing so he had another fall. He does not think he lost consciousness in either of these falls. 911 was called and upon EMS arrival he was hypertensive. His blood sugar was 213. He was placed in a precautionary C-collar, though denies neck pain upon arrival to the ED. He also received 50 mcg fentanyl en route. Eren currently endorses pain in his back, posterior right leg, left shoulder, and bilateral elbows. He says the pain in his upper left shoulder and left back is the most severe. The severity of his pain was initially a 7-8/10 and improved to a 5-6/10 after the fentanyl. No current chest pain, shortness of breath, or pain with deep breaths. No numbness, tingling, or weakness. Patient denies alcohol use. He says he has a history of a DVT in his left calf, though had an ultrasound a few weeks ago that showed the clot was gone. He took his Eliquis today. He lives at home with his wife. Patient adds that he had another fall recently and was evaluated at [facility] for this.     Independent Historian:   EMS - They report additional history as noted above.     Review of External Notes:  I reviewed the emergency department note from [facility] dated 2/26/2024: Patient was seen at that time had negative radiographs of the left shoulder and negative CT head in terms of any acute  traumatic findings.  At that time he was seen to have an age-indeterminate left nasal bone fracture.    Medications:    Zyloprim   Eliquis   Wellbutrin   Buspar   Peridex   Bentyl   Cymbalta   Proscar  Flomax   Flonase   Imdur   Cozaar   Toprol   Narcan   Zofran   Protonix   Demadex   Desyrel   Ventolin   Adderall   Norvasc   Colcrys   Flexeril   Neurontin  Amitiza   Zaroxolyn   Myrbetriq   Roxicodone  Klor-Con  Viagra   Bactrim  Ultram       Past Medical History:    Aneurysm of visceral artery   ASD  Asthma   BPH   CHF   Cholelithiasis   Chronic neck pain  COPD  CAD  DVT in left calf  Depressive disorder   Diverticulitis   Esophageal reflux   Gout   Hyperlipidemia   HTN  BPH with urinary obstruction and other lower urinary tract symptoms   Kidney stone   Subdural hematoma after fall   SVT  Testicular hypofunction   Esophageal reflux   Sciatica   Trigger finger, acquired  Insomnia   Peptic ulcer   Hypokalemia    Anemia   Acute renal failure   Bronchiectasis   Chronic rhinitis   Ankylosing spondylitis   Arteriovenous fistula of pulmonary vessels   Basal cell carcinoma   Biceps tendinitis of right shoulder   Cervical stenosis of spine   Spinal stenosis of lumbar region with neurogenic claudication   Continuous opioid dependence   Diabetic peripheral neuropathy   Esophageal stricture   IBS with constipation   ARLEN  OA  Oropharyngeal dysphagia   Urge incontinence   Plantar fasciitis, bilateral   Pulmonary fibrosis  Recurrent oral herpes simplex   Testosterone deficiency  Type II diabetes mellitus   Varicose veins of lower extremity  Vitamin D deficiency   NSTEMI  Hypertensive crisis   Acute encephalopathy   Delirium   Hypersomnia     Past Surgical History:    Abdominal artery aneurysm ligation   Colon surgery for diverticulitis   Colonoscopy, multiple  Coronary angiogram with angioplasty   Coronary angiogram with coronary stents placed x3  Cystoscopy, transurethral resection prostate, combined   Parathyroidectomy  "  Tonsillectomy & adenoidectomy    Polyp removal from colon   Exploratory heart surgery with bypass x2  Basal cell removal from nose   Trans anal excision of rectal polyp  Flexible sigmoidoscopy   EGD  Lasik, bilateral     Physical Exam   Patient Vitals for the past 24 hrs:   BP Temp Temp src Pulse Resp SpO2 Height Weight   02/28/24 1217 (!) 135/101 98.3  F (36.8  C) Oral (!) 129 16 100 % -- --   02/28/24 1209 (!) 140/95 98.3  F (36.8  C) Oral (!) 129 20 -- 1.575 m (5' 2\") 78.8 kg (173 lb 11.2 oz)        Physical Exam  General: Nontoxic. Resting comfortably  Head:  Scalp and head atraumatic, non tender to palpation  Eyes:  Pupils are equal, round, reactive to light EOMI, no nystagmus. Right periorbital ecchymosis and mild swelling. Periorbital bones non tender to palpation. Globes appear atraumatic.    Conjunctivae non-injected and sclerae white  ENT:    The external nose with laceration to the bridge of the nose with ecchymosis and mild soft tissue swelling.  The laceration is well-approximated.  No active bleeding.  No epistaxis.  Mild tenderness to palpation of the nose and nasal bones.  No significant deformity.  Remainder the facial bones are otherwise nontender.    Pinnae are normal  Neck:  Normal range of motion. Cervical spine nontender, no stepoffs    There is no rigidity noted    Trachea is in the midline  CV:  Tachycardic rate, regular rhythm     Normal S1/S2, no S3/S4    No murmur or rub. Radial pulses 2+ bilaterally.  Resp:  Lungs are clear and equal bilaterally  There is no tachypnea    No increased work of breathing    No rales, wheezing, or rhonchi  GI:  Abdomen is soft, no rigidity or guarding    No distension, or mass    No tenderness or rebound tenderness   MS:  Normal muscular tone. Severe pain with any movement of the left shoulder and elbow. Significant soft tissue swelling, ecchymosis and hematoma to the anterolateral left chest wall, shoulder and upper arm.  Superficial abrasions to the " posterior bilateral elbows.  CMS intact distally in the bilateral upper extremities. Radial pulses 2+ bilaterally. Compartments are soft.  Bilateral lower extremities atraumatic with full painless range of motion.  Superficial scattered abrasions in various stages of healing to the bilateral knees and shins.    Symmetric motor strength  Skin:  No rash or acute skin lesions noted, except as noted above.  Neuro:  A&Ox3, GCS 15    CN II - XII intact    Speech clear, fluent, and normal    Strength 5/5 and symmetric in bilateral upper and lower extremities, strength testing of the left shoulder and elbow limited due to acute traumatic injuries.    No leg drift. SILT throughout.    No ankle clonus    No tremor.     No meningismus   Psych: Normal affect. Appropriate interactions.     Emergency Department Course     ECG results from 02/28/24   EKG 12-lead, tracing only     Value    Systolic Blood Pressure     Diastolic Blood Pressure     Ventricular Rate 142    Atrial Rate 142    WA Interval 216    QRS Duration 90        QTc 513    P Axis 49    R AXIS 68    T Axis 35    Interpretation ECG      Sinus tachycardia with 1st degree A-V block  Possible Inferior infarct , age undetermined  Abnormal ECG  When compared with ECG of 22-DEC-2023 16:03,  Borderline criteria for Inferior infarct are now Present  Nonspecific T wave abnormality now evident in Inferior leads  Nonspecific T wave abnormality no longer evident in Anterior leads  Interpreted by Ayan Jung MD at 1228         Imaging:  Radius/Ulna XR,  PA &LAT, left   Final Result   IMPRESSION: Arterial calcifications. The radius and ulna appear   normal. Positive ulnar variance. 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 There are also degenerative changes at the STT joint and   first CMC joint. There is also chondrocalcinosis in the wrist.      TANISHA BARRERA MD            SYSTEM ID:   [id number]      Humerus XR,  G/E 2 views, left   Preliminary Result   IMPRESSION: The humerus appears normal. There is no evidence of   fracture. There is an os acromiale and there are hypertrophic changes   at the pseudoarthrosis between the os acromiale and the acromion   proper. Mild osteoarthrosis of the AC joint. Otherwise negative.      CT Cervical Spine w/o Contrast   Final Result   IMPRESSION:   1. No acute fracture or posttraumatic subluxation.   2. Multilevel cervical spondylosis as detailed above.       BARBIE MG MD            SYSTEM ID:  [id number]      CT Facial Bones without Contrast   Final Result   Impression: Again demonstrated is displaced age indeterminate nasal   fracture on the left. No new fracture.       BARBIE MG MD            SYSTEM ID:  [id number]      CT Chest/Abdomen/Pelvis w Contrast   Preliminary Result   IMPRESSION:   1.  Increased density material involving the left shoulder joint   worrisome for acute hematoma. This is ill-defined but appears to at   least partially extend to the left shoulder joint region and may be   intramuscular involving the muscle surrounding the left shoulder.   2.  No visible acute displaced fracture identified. Please note that   the structures of the left shoulder are incompletely included for   imaging assessment on this exam.    3.  No other acute abnormality identified.   4.  Stable pulmonary nodular opacities on the right.   5.  Subcutaneous edema at the left lateral chest wall extending to the   left upper abdomen level.          Recommendations for an incidental lung nodule = or > 6mm to 8mm:     Low risk patients: Initial follow-up CT at 6-12 months, then   consider CT at 18-24 months if no change.     High risk patients: Initial follow-up CT at 6-12 months, then CT at   18-24 months if no change.      *Low Risk: Minimal or absent history of smoking or other known risk   factors.   *Nonsolid (ground-glass) or partly solid nodules may  require longer   follow-up to exclude indolent adenocarcinoma.   *Recommendations based on Guidelines for the Management of Incidental   Pulmonary Nodules Detected at CT: From the Fleischner Society 2017,   Radiology 2017.      CT Lumbar Spine w/o Contrast   Final Result   IMPRESSION:    1. Thoracic spine:    a. No acute fracture or subluxation.   b. Multilevel spondylosis without high-grade spinal canal stenosis.   Moderate to severe right neural foraminal stenosis at T12-L1 secondary   to bone overgrowth.        2. Lumbar Spine:    a. Compression fracture of L1 with mild anterior vertebral body height   loss, new compared to 12/4/2023 and likely acute. No retropulsion of   the fractured vertebral body.   b. Multilevel lumbar spondylosis as above.      BARBIE MG MD            SYSTEM ID:  [id number]      CT Thoracic Spine w/o Contrast   Final Result   IMPRESSION:    1. Thoracic spine:    a. No acute fracture or subluxation.   b. Multilevel spondylosis without high-grade spinal canal stenosis.   Moderate to severe right neural foraminal stenosis at T12-L1 secondary   to bone overgrowth.        2. Lumbar Spine:    a. Compression fracture of L1 with mild anterior vertebral body height   loss, new compared to 12/4/2023 and likely acute. No retropulsion of   the fractured vertebral body.   b. Multilevel lumbar spondylosis as above.      BARBIE MG MD            SYSTEM ID:  [id number]      CT Head w/o Contrast   Final Result   IMPRESSION:    1. No acute intracranial pathology.    2. Moderate diffuse cerebral volume loss and mild chronic small vessel   ischemic disease.   3. Age-indeterminate left nasal fracture.      BARBIE MG MD            SYSTEM ID:  [id number]           Laboratory:  Labs Ordered and Resulted from Time of ED Arrival to Time of ED Departure   COMPREHENSIVE METABOLIC PANEL - Abnormal       Result Value    Sodium 135      Potassium 4.5      Carbon Dioxide (CO2) 25      Anion Gap 16 (*)      Urea Nitrogen 21.6      Creatinine 1.11      GFR Estimate 67      Calcium 9.0      Chloride 94 (*)     Glucose 138 (*)     Alkaline Phosphatase 138      AST 42      ALT 26      Protein Total 6.6      Albumin 3.6      Bilirubin Total 0.7     INR - Abnormal    INR 1.87 (*)    PARTIAL THROMBOPLASTIN TIME - Abnormal    aPTT 47 (*)    CK TOTAL - Abnormal     (*)    CBC WITH PLATELETS AND DIFFERENTIAL - Abnormal    WBC Count 9.0      RBC Count 3.31 (*)     Hemoglobin 10.2 (*)     Hematocrit 29.9 (*)     MCV 90      MCH 30.8      MCHC 34.1      RDW 12.5      Platelet Count 384      % Neutrophils 78      % Lymphocytes 10      % Monocytes 11      % Eosinophils 0      % Basophils 0      % Immature Granulocytes 1      NRBCs per 100 WBC 0      Absolute Neutrophils 7.1      Absolute Lymphocytes 0.9      Absolute Monocytes 1.0      Absolute Eosinophils 0.0      Absolute Basophils 0.0      Absolute Immature Granulocytes 0.1      Absolute NRBCs 0.0     ROUTINE UA WITH MICROSCOPIC REFLEX TO CULTURE - Abnormal    Color Urine Light Yellow      Appearance Urine Clear      Glucose Urine Negative      Bilirubin Urine Negative      Ketones Urine Negative      Specific Gravity Urine 1.014      Blood Urine Negative      pH Urine 7.5 (*)     Protein Albumin Urine Negative      Urobilinogen Urine Normal      Nitrite Urine Negative      Leukocyte Esterase Urine Trace (*)     Bacteria Urine Few (*)     Mucus Urine Present (*)     RBC Urine 1      WBC Urine 1      Squamous Epithelials Urine <1     ETHYL ALCOHOL LEVEL - Normal    Alcohol ethyl <0.01     GLUCOSE MONITOR NURSING POCT   TYPE AND SCREEN, ADULT    ABO/RH(D) A NEG      Antibody Screen Negative      SPECIMEN EXPIRATION DATE [id number]     ABO/RH TYPE AND SCREEN        Procedures     Emergency Department Course & Assessments:       Interventions:  Medications   sodium chloride (PF) 0.9% PF flush 3 mL (3 mLs Intracatheter $Given 2/28/24 1240)   sodium chloride (PF) 0.9% PF flush  3 mL (has no administration in time range)   ondansetron (ZOFRAN) injection 4 mg (has no administration in time range)   fentaNYL (PF) (SUBLIMAZE) injection 50 mcg (50 mcg Intravenous $Given 2/28/24 1432)   sodium chloride 0.9 % bag 100 mL for CT scan flush use (100 mLs As instructed $Given 2/28/24 1344)   acetaminophen (TYLENOL) tablet 1,000 mg (has no administration in time range)   sodium chloride 0.9% BOLUS 500 mL (0 mLs Intravenous Stopped 2/28/24 1330)   iopamidol (ISOVUE-370) solution 500 mL (86 mLs Intravenous $Given 2/28/24 1344)        Assessments, Independent Interpretation, Consults/Discussion of Management/Tests:  ED Course as of 02/28/24 1508   Wed Feb 28, 2024   1202 I obtained history and examined the patient as noted above.   1507 Case discussed with hospitalist, Dr. Toñito Jessica who accepts the patient for admission.       Social Determinants of Health affecting care:  None    Disposition:  The patient was admitted to the hospital under the care of Dr. Toñito Jessica.     Impression & Plan      Medical Decision Making:  Eren James is a 78 year old male who presents to the ED for evaluation of multiple recent falls.  On arrival patient is hemodynamically stable but tachycardic.  Tachycardia is felt to be due to pain.  ABCs are intact.  Cervical spine without tenderness.  No focal neurologic deficits.  He has a laceration of the bridge of the nose which is well-approximated and does not require sutures or repair.  Additional abrasions and skin tears to the bilateral elbows, knees and lower legs.  No other significant wounds.  Trauma workup in the emergency department is reassuring.  No evidence for intracranial hemorrhage, spinal trauma, rib fracture, pneumothorax, hemothorax, solid organ injury or any other acute significant traumatic injuries to the chest abdomen or pelvis.  CT does show a significant hematoma involving the left chest wall, left shoulder with involvement of the joint.  No fracture or  dislocation.  There is an L1 mild compression fracture.  No other spinal injury.  This is the likely etiology of his back pain.  CT of the facial bones without any acute fracture he has a known chronic nasal bone fracture which is unchanged.  Patient is anticoagulated on Eliquis for recent DVT.  CMP is largely reassuring.  CBC without leukocytosis.  Hemoglobin of 10.2 which is stable from baseline.  EKG without evidence of acute ischemia or dysrhythmia.  Given his multiple falls now with significant traumatic hematomas, elevated CK level concerning for developing traumatic rhabdomyolysis, generalized weakness patient will be admitted to the hospital for ongoing monitoring evaluation and treatment.  The case was discussed with the hospitalist and the patient was admitted in stable condition.      Diagnosis:    ICD-10-CM    1. Fall at home, initial encounter  W19.XXXA     Y92.009       2. Traumatic hematoma of shoulder, left, initial encounter  S40.012A       3. Chest wall hematoma, left, initial encounter  S20.212A       4. Elevated CK  R74.8       5. Closed compression fracture of L1 vertebra, initial encounter (H)  S32.010A              Scribe Disclosure:  I, Nely Thompson, am serving as a scribe at 12:00 PM on 2/28/2024 to document services personally performed by Ayan Jung MD based on my observations and the provider's statements to me.     2/28/2024   Ayan Jung MD Daro, Ryan Clay, MD  02/28/24 0011

## 2024-02-28 NOTE — ED NOTES
[facility]  ED Nurse Handoff Report    ED Chief complaint: Fall  . ED Diagnosis:   Final diagnoses:   Fall at home, initial encounter   Traumatic hematoma of shoulder, left, initial encounter   Chest wall hematoma, left, initial encounter   Elevated CK   Closed compression fracture of L1 vertebra, initial encounter (H)       Allergies:   Allergies   Allergen Reactions    Armodafinil Other (See Comments)       Code Status: Full Code    Activity level - Baseline/Home:  independent.  Activity Level - Current:   assist of 2 and lift.   Lift room needed: Yes.   Bariatric: No   Needed: No   Isolation: No.   Infection: Not Applicable.     Respiratory status: Room air    Vital Signs (within 30 minutes):   Vitals:    02/28/24 1217 02/28/24 1500 02/28/24 1600 02/28/24 1624   BP: (!) 135/101 (!) 149/94 136/85    Pulse: (!) 129 (!) 141 (!) 142 107   Resp: 16 12 13    Temp: 98.3  F (36.8  C)      TempSrc: Oral      SpO2: 100% 92%     Weight:       Height:           Cardiac Rhythm:  ,   Cardiac  Cardiac Rhythm: Sinus tachycardia  Pain level:    Patient confused:  Forgetful .   Patient Falls Risk: nonskid shoes/slippers when out of bed, arm band in place, patient and family education, assistive device/personal items within reach, activity supervised, and mobility aid in reach.   Elimination Status:  INC of bladder      Patient Report - Initial Complaint: Pt BIBA [facility] pt tripped over a rug in his entry way left shoulder pain and large hematoma noted. CMS intact.  Pt is on eliquis.  Pt has bruises in multiple stages of healing and multiple small abrasions, Right elbow right and left leg.  EMS gave 50mcg fentanyl.  Focused Assessment: Pt has severe bruising and wounds on body. A&O but forgetful. LS clear. HR NSR/ST. INC of bladder. PT is lift and AX2 at moment. Has not been out of bed. Pt has chronic pain, usually uses Fentanyl pump, high tolerance.      Abnormal Results:   Labs Ordered and  Resulted from Time of ED Arrival to Time of ED Departure   COMPREHENSIVE METABOLIC PANEL - Abnormal       Result Value    Sodium 135      Potassium 4.5      Carbon Dioxide (CO2) 25      Anion Gap 16 (*)     Urea Nitrogen 21.6      Creatinine 1.11      GFR Estimate 67      Calcium 9.0      Chloride 94 (*)     Glucose 138 (*)     Alkaline Phosphatase 138      AST 42      ALT 26      Protein Total 6.6      Albumin 3.6      Bilirubin Total 0.7     INR - Abnormal    INR 1.87 (*)    PARTIAL THROMBOPLASTIN TIME - Abnormal    aPTT 47 (*)    CK TOTAL - Abnormal     (*)    CBC WITH PLATELETS AND DIFFERENTIAL - Abnormal    WBC Count 9.0      RBC Count 3.31 (*)     Hemoglobin 10.2 (*)     Hematocrit 29.9 (*)     MCV 90      MCH 30.8      MCHC 34.1      RDW 12.5      Platelet Count 384      % Neutrophils 78      % Lymphocytes 10      % Monocytes 11      % Eosinophils 0      % Basophils 0      % Immature Granulocytes 1      NRBCs per 100 WBC 0      Absolute Neutrophils 7.1      Absolute Lymphocytes 0.9      Absolute Monocytes 1.0      Absolute Eosinophils 0.0      Absolute Basophils 0.0      Absolute Immature Granulocytes 0.1      Absolute NRBCs 0.0     ROUTINE UA WITH MICROSCOPIC REFLEX TO CULTURE - Abnormal    Color Urine Light Yellow      Appearance Urine Clear      Glucose Urine Negative      Bilirubin Urine Negative      Ketones Urine Negative      Specific Gravity Urine 1.014      Blood Urine Negative      pH Urine 7.5 (*)     Protein Albumin Urine Negative      Urobilinogen Urine Normal      Nitrite Urine Negative      Leukocyte Esterase Urine Trace (*)     Bacteria Urine Few (*)     Mucus Urine Present (*)     RBC Urine 1      WBC Urine 1      Squamous Epithelials Urine <1     ETHYL ALCOHOL LEVEL - Normal    Alcohol ethyl <0.01     GLUCOSE MONITOR NURSING POCT   TYPE AND SCREEN, ADULT    ABO/RH(D) A NEG      Antibody Screen Negative      SPECIMEN EXPIRATION DATE [id number]     ABO/RH TYPE AND SCREEN         Radius/Ulna XR,  PA &LAT, left   Final Result   IMPRESSION: Arterial calcifications. The radius and ulna appear   normal. Positive ulnar variance. 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 There are also degenerative changes at the STT joint and   first CMC joint. There is also chondrocalcinosis in the wrist.      TANISHA BARRERA MD            SYSTEM ID:  [id number]      Humerus XR,  G/E 2 views, left   Final Result   IMPRESSION: The humerus appears normal. There is no evidence of   fracture. There is an os acromiale and there are hypertrophic changes   at the pseudoarthrosis between the os acromiale and the acromion   proper. Mild osteoarthrosis of the AC joint. Otherwise negative.      TANISHA BARRERA MD            SYSTEM ID:  [id number]      CT Cervical Spine w/o Contrast   Final Result   IMPRESSION:   1. No acute fracture or posttraumatic subluxation.   2. Multilevel cervical spondylosis as detailed above.       BARBIE MG MD            SYSTEM ID:  [id number]      CT Facial Bones without Contrast   Final Result   Impression: Again demonstrated is displaced age indeterminate nasal   fracture on the left. No new fracture.       BARBIE MG MD            SYSTEM ID:  [id number]      CT Chest/Abdomen/Pelvis w Contrast   Final Result   IMPRESSION:   1.  Increased density material involving the left shoulder joint   worrisome for acute hematoma. This is ill-defined but appears to at   least partially extend to the left shoulder joint region and may be   intramuscular involving the muscle surrounding the left shoulder.   2.  No visible acute displaced fracture identified. Please note that   the structures of the left shoulder are incompletely included for   imaging assessment on this exam.    3.  No other acute abnormality identified.   4.  Stable pulmonary nodular opacities on the right.   5.  Subcutaneous edema at the left  lateral chest wall extending to the   left upper abdomen level.          Recommendations for an incidental lung nodule = or > 6mm to 8mm:     Low risk patients: Initial follow-up CT at 6-12 months, then   consider CT at 18-24 months if no change.     High risk patients: Initial follow-up CT at 6-12 months, then CT at   18-24 months if no change.      *Low Risk: Minimal or absent history of smoking or other known risk   factors.   *Nonsolid (ground-glass) or partly solid nodules may require longer   follow-up to exclude indolent adenocarcinoma.   *Recommendations based on Guidelines for the Management of Incidental   Pulmonary Nodules Detected at CT: From the Fleischner Society 2017,   Radiology 2017.      AUDELIA STILES MD            SYSTEM ID:  [id number]      CT Lumbar Spine w/o Contrast   Final Result   IMPRESSION:    1. Thoracic spine:    a. No acute fracture or subluxation.   b. Multilevel spondylosis without high-grade spinal canal stenosis.   Moderate to severe right neural foraminal stenosis at T12-L1 secondary   to bone overgrowth.        2. Lumbar Spine:    a. Compression fracture of L1 with mild anterior vertebral body height   loss, new compared to 12/4/2023 and likely acute. No retropulsion of   the fractured vertebral body.   b. Multilevel lumbar spondylosis as above.      BARBIE MG MD            SYSTEM ID:  [id number]      CT Thoracic Spine w/o Contrast   Final Result   IMPRESSION:    1. Thoracic spine:    a. No acute fracture or subluxation.   b. Multilevel spondylosis without high-grade spinal canal stenosis.   Moderate to severe right neural foraminal stenosis at T12-L1 secondary   to bone overgrowth.        2. Lumbar Spine:    a. Compression fracture of L1 with mild anterior vertebral body height   loss, new compared to 12/4/2023 and likely acute. No retropulsion of   the fractured vertebral body.   b. Multilevel lumbar spondylosis as above.      BARBIE MG MD            SYSTEM ID:   [id number]      CT Head w/o Contrast   Final Result   IMPRESSION:    1. No acute intracranial pathology.    2. Moderate diffuse cerebral volume loss and mild chronic small vessel   ischemic disease.   3. Age-indeterminate left nasal fracture.      BARBIE MG MD            SYSTEM ID:  [id number]          Treatments provided: IVF, IV dilaudid and IV fentanyl  Family Comments: Wife notified   OBS brochure/video discussed/provided to patient:  N/A  ED Medications:   Medications   sodium chloride (PF) 0.9% PF flush 3 mL (3 mLs Intracatheter $Given 2/28/24 1240)   sodium chloride (PF) 0.9% PF flush 3 mL (has no administration in time range)   ondansetron (ZOFRAN) injection 4 mg (has no administration in time range)   sodium chloride 0.9 % bag 100 mL for CT scan flush use (100 mLs As instructed $Given 2/28/24 1344)   HYDROmorphone (PF) (DILAUDID) injection 0.5 mg (0.5 mg Intravenous $Given 2/28/24 1624)   sodium chloride 0.9% BOLUS 500 mL (0 mLs Intravenous Stopped 2/28/24 1330)   iopamidol (ISOVUE-370) solution 500 mL (86 mLs Intravenous $Given 2/28/24 1344)   acetaminophen (TYLENOL) tablet 1,000 mg (1,000 mg Oral $Given 2/28/24 1521)   HYDROmorphone (PF) (DILAUDID) injection 0.5 mg (0.5 mg Intravenous $Given 2/28/24 1520)       Drips infusing:  No  For the majority of the shift this patient was Green.   Interventions performed were NA.    Sepsis treatment initiated: No    Cares/treatment/interventions/medications to be completed following ED care: None    ED Nurse Name: Magdy Lambert RN  4:39 PM

## 2024-02-29 ENCOUNTER — APPOINTMENT (OUTPATIENT)
Dept: OCCUPATIONAL THERAPY | Facility: CLINIC | Age: 79
DRG: 543 | End: 2024-02-29
Attending: INTERNAL MEDICINE
Payer: MEDICARE

## 2024-02-29 ENCOUNTER — APPOINTMENT (OUTPATIENT)
Dept: PHYSICAL THERAPY | Facility: CLINIC | Age: 79
DRG: 543 | End: 2024-02-29
Attending: INTERNAL MEDICINE
Payer: MEDICARE

## 2024-02-29 LAB
ANION GAP SERPL CALCULATED.3IONS-SCNC: 11 MMOL/L (ref 7–15)
BUN SERPL-MCNC: 17.2 MG/DL (ref 8–23)
CALCIUM SERPL-MCNC: 8.1 MG/DL (ref 8.8–10.2)
CHLORIDE SERPL-SCNC: 97 MMOL/L (ref 98–107)
CREAT SERPL-MCNC: 0.83 MG/DL (ref 0.67–1.17)
DEPRECATED HCO3 PLAS-SCNC: 24 MMOL/L (ref 22–29)
EGFRCR SERPLBLD CKD-EPI 2021: 88 ML/MIN/1.73M2
ERYTHROCYTE [DISTWIDTH] IN BLOOD BY AUTOMATED COUNT: 12.7 % (ref 10–15)
GLUCOSE SERPL-MCNC: 85 MG/DL (ref 70–99)
HCT VFR BLD AUTO: 26.1 % (ref 40–53)
HGB BLD-MCNC: 8.3 G/DL (ref 13.3–17.7)
HGB BLD-MCNC: 8.5 G/DL (ref 13.3–17.7)
MCH RBC QN AUTO: 30.5 PG (ref 26.5–33)
MCHC RBC AUTO-ENTMCNC: 32.6 G/DL (ref 31.5–36.5)
MCV RBC AUTO: 94 FL (ref 78–100)
PLATELET # BLD AUTO: 337 10E3/UL (ref 150–450)
POTASSIUM SERPL-SCNC: 3.9 MMOL/L (ref 3.4–5.3)
RBC # BLD AUTO: 2.79 10E6/UL (ref 4.4–5.9)
SODIUM SERPL-SCNC: 132 MMOL/L (ref 135–145)
WBC # BLD AUTO: 7.4 10E3/UL (ref 4–11)

## 2024-02-29 PROCEDURE — 36415 COLL VENOUS BLD VENIPUNCTURE: CPT | Performed by: INTERNAL MEDICINE

## 2024-02-29 PROCEDURE — 258N000003 HC RX IP 258 OP 636: Performed by: INTERNAL MEDICINE

## 2024-02-29 PROCEDURE — 120N000001 HC R&B MED SURG/OB

## 2024-02-29 PROCEDURE — 82607 VITAMIN B-12: CPT | Performed by: INTERNAL MEDICINE

## 2024-02-29 PROCEDURE — 99221 1ST HOSP IP/OBS SF/LOW 40: CPT | Performed by: NURSE PRACTITIONER

## 2024-02-29 PROCEDURE — 250N000013 HC RX MED GY IP 250 OP 250 PS 637: Performed by: INTERNAL MEDICINE

## 2024-02-29 PROCEDURE — 82746 ASSAY OF FOLIC ACID SERUM: CPT | Performed by: INTERNAL MEDICINE

## 2024-02-29 PROCEDURE — 80048 BASIC METABOLIC PNL TOTAL CA: CPT | Performed by: INTERNAL MEDICINE

## 2024-02-29 PROCEDURE — 250N000011 HC RX IP 250 OP 636: Performed by: INTERNAL MEDICINE

## 2024-02-29 PROCEDURE — 97530 THERAPEUTIC ACTIVITIES: CPT | Mod: GP

## 2024-02-29 PROCEDURE — G0463 HOSPITAL OUTPT CLINIC VISIT: HCPCS

## 2024-02-29 PROCEDURE — 97110 THERAPEUTIC EXERCISES: CPT | Mod: GO

## 2024-02-29 PROCEDURE — 97162 PT EVAL MOD COMPLEX 30 MIN: CPT | Mod: GP

## 2024-02-29 PROCEDURE — 99232 SBSQ HOSP IP/OBS MODERATE 35: CPT | Performed by: INTERNAL MEDICINE

## 2024-02-29 PROCEDURE — 97530 THERAPEUTIC ACTIVITIES: CPT | Mod: GO

## 2024-02-29 PROCEDURE — 97165 OT EVAL LOW COMPLEX 30 MIN: CPT | Mod: GO

## 2024-02-29 PROCEDURE — 97116 GAIT TRAINING THERAPY: CPT | Mod: GP

## 2024-02-29 PROCEDURE — 85014 HEMATOCRIT: CPT | Performed by: INTERNAL MEDICINE

## 2024-02-29 PROCEDURE — 85018 HEMOGLOBIN: CPT | Performed by: INTERNAL MEDICINE

## 2024-02-29 RX ADMIN — POTASSIUM CHLORIDE 20 MEQ: 750 TABLET, FILM COATED, EXTENDED RELEASE ORAL at 08:27

## 2024-02-29 RX ADMIN — METOPROLOL SUCCINATE 25 MG: 25 TABLET, EXTENDED RELEASE ORAL at 08:28

## 2024-02-29 RX ADMIN — TRAZODONE HYDROCHLORIDE 75 MG: 50 TABLET ORAL at 00:57

## 2024-02-29 RX ADMIN — METHOCARBAMOL 750 MG: 750 TABLET ORAL at 06:16

## 2024-02-29 RX ADMIN — TORSEMIDE 20 MG: 20 TABLET ORAL at 08:27

## 2024-02-29 RX ADMIN — ALLOPURINOL 300 MG: 300 TABLET ORAL at 08:27

## 2024-02-29 RX ADMIN — TAMSULOSIN HYDROCHLORIDE 0.4 MG: 0.4 CAPSULE ORAL at 08:27

## 2024-02-29 RX ADMIN — LOSARTAN POTASSIUM 50 MG: 50 TABLET, FILM COATED ORAL at 08:27

## 2024-02-29 RX ADMIN — ACETAMINOPHEN 975 MG: 325 TABLET, FILM COATED ORAL at 14:42

## 2024-02-29 RX ADMIN — LIDOCAINE 1 PATCH: 4 PATCH TOPICAL at 20:31

## 2024-02-29 RX ADMIN — HYDROMORPHONE HYDROCHLORIDE 0.5 MG: 1 INJECTION, SOLUTION INTRAMUSCULAR; INTRAVENOUS; SUBCUTANEOUS at 00:38

## 2024-02-29 RX ADMIN — FINASTERIDE 5 MG: 5 TABLET, FILM COATED ORAL at 09:43

## 2024-02-29 RX ADMIN — HYDROMORPHONE HYDROCHLORIDE 0.5 MG: 1 INJECTION, SOLUTION INTRAMUSCULAR; INTRAVENOUS; SUBCUTANEOUS at 20:28

## 2024-02-29 RX ADMIN — METHOCARBAMOL 750 MG: 750 TABLET ORAL at 14:42

## 2024-02-29 RX ADMIN — BUPROPION 300 MG: 150 TABLET, EXTENDED RELEASE ORAL at 08:27

## 2024-02-29 RX ADMIN — BUSPIRONE HYDROCHLORIDE 10 MG: 10 TABLET ORAL at 08:27

## 2024-02-29 RX ADMIN — TRAZODONE HYDROCHLORIDE 75 MG: 50 TABLET ORAL at 21:52

## 2024-02-29 RX ADMIN — ATORVASTATIN CALCIUM 40 MG: 40 TABLET, FILM COATED ORAL at 21:53

## 2024-02-29 RX ADMIN — BUSPIRONE HYDROCHLORIDE 10 MG: 10 TABLET ORAL at 00:57

## 2024-02-29 RX ADMIN — DICLOFENAC SODIUM 2 G: 10 GEL TOPICAL at 20:37

## 2024-02-29 RX ADMIN — ISOSORBIDE MONONITRATE 90 MG: 30 TABLET, EXTENDED RELEASE ORAL at 08:27

## 2024-02-29 RX ADMIN — HYDROMORPHONE HYDROCHLORIDE 0.5 MG: 1 INJECTION, SOLUTION INTRAMUSCULAR; INTRAVENOUS; SUBCUTANEOUS at 12:46

## 2024-02-29 RX ADMIN — HYDROMORPHONE HYDROCHLORIDE 0.5 MG: 1 INJECTION, SOLUTION INTRAMUSCULAR; INTRAVENOUS; SUBCUTANEOUS at 03:50

## 2024-02-29 RX ADMIN — ACETAMINOPHEN 975 MG: 325 TABLET, FILM COATED ORAL at 08:27

## 2024-02-29 RX ADMIN — HYDROMORPHONE HYDROCHLORIDE 0.5 MG: 1 INJECTION, SOLUTION INTRAMUSCULAR; INTRAVENOUS; SUBCUTANEOUS at 09:47

## 2024-02-29 RX ADMIN — SODIUM CHLORIDE 500 ML: 9 INJECTION, SOLUTION INTRAVENOUS at 17:17

## 2024-02-29 RX ADMIN — ACETAMINOPHEN 975 MG: 325 TABLET, FILM COATED ORAL at 20:28

## 2024-02-29 RX ADMIN — ATORVASTATIN CALCIUM 40 MG: 40 TABLET, FILM COATED ORAL at 00:58

## 2024-02-29 RX ADMIN — POTASSIUM CHLORIDE 20 MEQ: 750 TABLET, FILM COATED, EXTENDED RELEASE ORAL at 00:57

## 2024-02-29 RX ADMIN — DULOXETINE HYDROCHLORIDE 60 MG: 60 CAPSULE, DELAYED RELEASE ORAL at 08:27

## 2024-02-29 RX ADMIN — BUSPIRONE HYDROCHLORIDE 10 MG: 10 TABLET ORAL at 20:28

## 2024-02-29 RX ADMIN — ACETAMINOPHEN 975 MG: 325 TABLET, FILM COATED ORAL at 00:56

## 2024-02-29 RX ADMIN — POTASSIUM CHLORIDE 20 MEQ: 750 TABLET, FILM COATED, EXTENDED RELEASE ORAL at 20:28

## 2024-02-29 RX ADMIN — BUSPIRONE HYDROCHLORIDE 10 MG: 10 TABLET ORAL at 14:42

## 2024-02-29 ASSESSMENT — ACTIVITIES OF DAILY LIVING (ADL)
DEPENDENT_IADLS:: TRANSPORTATION
ADLS_ACUITY_SCORE: 41
ADLS_ACUITY_SCORE: 28
ADLS_ACUITY_SCORE: 41
ADLS_ACUITY_SCORE: 28
ADLS_ACUITY_SCORE: 41
ADLS_ACUITY_SCORE: 34
ADLS_ACUITY_SCORE: 28
ADLS_ACUITY_SCORE: 28
ADLS_ACUITY_SCORE: 41
ADLS_ACUITY_SCORE: 28
ADLS_ACUITY_SCORE: 34
ADLS_ACUITY_SCORE: 28
ADLS_ACUITY_SCORE: 41
ADLS_ACUITY_SCORE: 41
ADLS_ACUITY_SCORE: 34
ADLS_ACUITY_SCORE: 41
ADLS_ACUITY_SCORE: 28
ADLS_ACUITY_SCORE: 41
ADLS_ACUITY_SCORE: 28
ADLS_ACUITY_SCORE: 28
ADLS_ACUITY_SCORE: 41
ADLS_ACUITY_SCORE: 28
ADLS_ACUITY_SCORE: 28

## 2024-02-29 NOTE — PROGRESS NOTES
Arrived to room (602) from ER at (1750) via cart, alert and oriented x 4, oriented to room and call system, IV patent and SL, reviewed welcome folder and call light system.     Transferred via parker  Rapid speech, able to follow commands once attention is fully focused   Orientation questions answered without difficulty however unsure how orientated pt actually is. Pt is difficult to get focused. Example: request to roll to side slowly and pt FLIPPED from one side to the other

## 2024-02-29 NOTE — PLAN OF CARE
Goal Outcome Evaluation:      Plan of Care Reviewed With: patient    Overall Patient Progress: no change     Pt alert and oriented with forgetfulness noted. Did have to be reoriented to the day this morning at end of shift. Neuros were intact with the exception of pupil size. This was addressed last evening and a CT Head was performed with no new changes from previous CT. L shoulder and chest wall are ecchymotic along with large hematoma noted anteriorly. Ice applied and arm elevated. +2 edema in L lower arm with +3 edema in L shoulder. Ecchymosis throughout upper and lower extremities and lower back. Facial abrasion noted to bridge of nose along with ecchymosis to L eye. Up with 1 assist, walker, and gait belt to and from bathroom. Pt c/o of pain of 7-8/10 and received IV Dilaudid X 2 with Robaxin towards morning and end of this writer's shift. Pt was very restless and frustrated with his pain management. Pt's intrathecal pain pump was unable to give boluses d/t controller not being charged. Pt became calm, cooperative, and pleasant after explaining plan of care and the options he had for pain management. Ortho/Neurosurgery/Pain Team and PT/OT consulted and discussed this with pt. Pt slept well in between cares this shift. Call light within reach and bed alarm on.

## 2024-02-29 NOTE — PLAN OF CARE
Goal Outcome Evaluation:      Plan of Care Reviewed With: patient    Overall Patient Progress: improvingOverall Patient Progress: improving     Pt A/O x3, very forgetful at times. Pt repeating request and question to staff members that were answered. Neuros intact with exception of pupil size but this size has not changed since being in the hospital. VSS; except this evening had a BP of 86/42, MD made aware, 500cc bolus given and hgb checked. Pain managed with scheduled Tylenol and PRN Robaxin and IV dilaudid. Pain team not able to meet with pt today regarding pain plan and pain pump, planning on coming tomorrow 3/1. Assist x1 with walker and gait belt, pt can be very impulsive at time and unsteady. Voiding well, 1 episode of bladder incontinence. Tolerating a regular diet well. CMS intact. L shoulder and chest wall are ecchymotic along with large hematoma noted anteriorly. Painful and stiff for pt to use LUE. ROM provided. Ecchymosis noted throughout upper and lower extremities and lower back. Facial abrasion noted to bridge of nose along with ecchymosis to L eye. WOC consulted for abrasions to LLE and LUE. Dressing changed for both this evening due to the falling off pt. Plan is conservative at this time and possible discharge to TCU when ready.. Ortho, Neurosurgery saw today. SW, PT/OT following.    Recheck of BP after bolus was 93/55

## 2024-02-29 NOTE — PLAN OF CARE
"Care continued: 1900 - 2330  Goal Outcome Evaluation:      Plan of Care Reviewed With: patient    Outcome Evaluation: Verbalized understanding of education    Critical shift events: Pt now confused, pupil change @ 22:24- MD paged and at bedside and pt sent for stat hed CT    A+O self and situation with intermittent confusion  Pupils uneven- L 3mm R 2mm PERRLA present  No pronator drift noted,  equal   Lung sounds clear   BS present, passing flatus  Voids spontaneously with some incontinence    Low BP's noted and MD aware  BP (!) 155/83 (BP Location: Right arm)   Pulse 96   Temp 98.6  F (37  C) (Temporal)   Resp 18   Ht 1.575 m (5' 2\")   Wt 78.8 kg (173 lb 11.2 oz)   SpO2 99%   BMI 31.77 kg/m      RN will continue to maintain care and the treatment plan this shift, handing off care to oncoming RN at 2300 02/28/2024     Maegan Slade, RN 02/28/2024 11:53 PM     "

## 2024-02-29 NOTE — CONSULTS
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  Much of the history is obtained from the patient's wife Tg via phone given the patient's anxiety in the emergency department.  The patient's wife notes that the patient has been falling nearly on a daily basis recently.  He went to [facility] to get some blood drawn on 2/26 but fell while getting out of the car in the parking lot.  He was taken to the emergency department at that time and discharged home after negative workup.  She reports he has fallen several times since then.  Yesterday he saw his pain doctor regarding his pain pump and it is working appropriately.  Today the patient needed to go to a doctor's appointment so his wife called an [employer] for him.  While getting into the car he fell and was brought to the emergency department for evaluation.     Assessment:      Areas visualized during today's visit: Face and neck, Lower extremities , and Upper extremities     Wound location: Face  Last photo: 2/29/24    Wound due to: Trauma  Wound history/plan of care: Multiple abrasions from falling.   Wound base: 100 % dry drainage     Palpation of the wound bed: normal      Drainage: none     Description of drainage: none     Measurements (length x width x depth, in cm): Approximately 15 areas on nose and forehead ranging from pinpoint to 0.5x2cm       Tunneling: N/A     Undermining: N/A  Periwound skin: Intact      Color: normal and consistent with surrounding tissue      Temperature: normal    Odor: none  Pain: denies   Pain interventions prior to dressing change: N/A  Treatment goal: Heal   STATUS: initial assessment  Supplies ordered: discussed with RN     Wound location: Left arm  Last photo: 2/29/24    Wound due to: Trauma  Wound history/plan of care: Multiple skin tears from falling.  Also with hematoma on upper arm.    Wound base: Mix of epidermis, dermis and dry drainage      Palpation of the wound bed: normal      Drainage: moderate     Description of drainage: serous     Measurements (length x width x depth, in cm): 4 areas ranging from 1x1cm to 2x2cm      Tunneling: N/A     Undermining: N/A  Periwound skin: Intact, Ecchymosis, and Edematous      Color: pink and purple      Temperature: normal   Odor: none  Pain: mild  Pain interventions prior to dressing change: slow and gentle cares   Treatment goal: Heal  and Protection  STATUS: initial assessment  Supplies ordered: supplies stored on unit    Wound location: Bilateral legs  Last photo: 2/29/24    Wound due to: Trauma  Wound history/plan of care: Multiple abrasions and skin tears from multiple falls.  Reports these first occurred over a month ago but then several reopened with recent falls.   Wound base: Mix of dermis and dry drainage     Palpation of the wound bed: normal      Drainage: moderate-only left shin draining     Description of drainage: serosanguinous     Measurements (length x width x depth, in cm): Approximately 15 areas ranging from 0.2x0.2cm to 1.5x2.5cm      Tunneling: N/A     Undermining: N/A  Periwound skin: Ecchymosis      Color: purple      Temperature: normal   Odor: none  Pain: mild  Pain interventions prior to dressing change: N/A  Treatment goal: Heal   STATUS: initial assessment  Supplies ordered: supplies stored on unit    Treatment Plan:     Left arm and left shin wound(s): Daily and prn if saturated  Cleanse with wound cleanser and dry  Apply Sween cream to wounds  Apply adaptic   Cover with dry gauze and wrap  with kerlix    May need compression to left arm in future    Orders: Written    RECOMMEND PRIMARY TEAM ORDER: None, at this time  Education provided: plan of care  Discussed plan of care with: Patient, Family, and Nurse  WO nurse follow-up plan: weekly  Notify WOC if wound(s) deteriorate.  Nursing to notify the Provider(s) and re-consult the WOC Nurse if new skin concern.    DATA:     Current support surface: Standard  Standard gel/foam mattress (IsoFlex, Atmos air, etc)  BMI: Body mass index is 31.77 kg/m .   Active diet order: Orders Placed This Encounter      Combination Diet Regular Diet Adult     Output: I/O last 3 completed shifts:  In: -   Out: 450 [Urine:450]     Labs:   Recent Labs   Lab 02/29/24  0614 02/28/24  1214   ALBUMIN  --  3.6   HGB 8.5* 10.2*   INR  --  1.87*   WBC 7.4 9.0     Pressure injury risk assessment:   Sensory Perception: 3-->slightly limited  Moisture: 4-->rarely moist  Activity: 3-->walks occasionally  Mobility: 3-->slightly limited  Nutrition: 3-->adequate  Friction and Shear: 3-->no apparent problem  Ben Score: 19    KIANA Mcnamara [facility] [employer]  Dept. Office Number: [phone]

## 2024-02-29 NOTE — PROGRESS NOTES
"   02/29/24 0953   Appointment Info   Signing Clinician's Name / Credentials (OT) Virgil Gentile, OTR/L   Rehab Comments (OT) (S)  Per ortho: Encourage movement of the left shoulder to prevent adhesive capsulitis. Continue with elbow, wrist, and digital ROM. Avoid use of sling. Per neurosurg: No need for bracing. Limit bending lifting twisting of lumbar spine   Living Environment   People in Home spouse   Current Living Arrangements house   Home Accessibility stairs to enter home   Number of Stairs, Main Entrance 2   Stair Railings, Main Entrance none   Transportation Anticipated family or friend will provide   Self-Care   Usual Activity Tolerance good   Current Activity Tolerance moderate   Equipment Currently Used at Home cane, straight   Fall history within last six months yes   Number of times patient has fallen within last six months   (multiple. About 6 in the last week per spouse)   Activity/Exercise/Self-Care Comment Patient reports independence with ADL and functional mobility at baseline. Uses a cane  outside of the home and a WW in the home most of the time. Patient has recently given up driving. Manages medications and finances.   General Information   Onset of Illness/Injury or Date of Surgery 02/28/24   Patient/Family Therapy Goal Statement (OT) \"To be independent and get more strength\"   Existing Precautions/Restrictions fall   Limitations/Impairments safety/cognitive   General Observations and Info [facility]   Cognitive Status Examination   Orientation Status orientation to person, place and time   Follows Commands follows one-step commands;75-90% accuracy;physical/tactile prompts required;repetition of directions required;verbal cues/prompting required   Cognitive Status Comments Requires repition of instructions. Reduced awareness regarding deficits / safety concerns.   Cognitive Screens/Assessments   Cognitive Assessments Completed Blessed Orientation-Memory-Concentration   Blessed " Orientation-Memory-Concentration Test:  Total Weighted Score out of 28 0   [facility] Orientation-Memory-Concentration Test Norms 0-8 equals normal to mild impairment   Sensory   Sensory Comments Neuropathy in LEs. Some tingling in hands.   Pain Assessment   Patient Currently in Pain Yes, see Vital Sign flowsheet   Range of Motion Comprehensive   Comment, General Range of Motion L UE impaired 2/2 pain   Strength Comprehensive (MMT)   Comment, General Manual Muscle Testing (MMT) Assessment Generalized weakness noted w/ ADL   Transfers   Transfers sit-stand transfer;toilet transfer;bed-chair transfer   Transfer Skill: Bed to Chair/Chair to Bed   Bed-Chair Aladdin (Transfers) minimum assist (75% patient effort)   Sit-Stand Transfer   Sit-Stand Aladdin (Transfers) contact guard   Toilet Transfer   Aladdin Level (Toilet Transfer) minimum assist (75% patient effort)   Balance   Balance Comments Impaired balance. Leaning L and posteriorly at EOB. Requiring Ax1 with ambulating and transfers.   Activities of Daily Living   BADL Assessment/Intervention toileting;grooming;upper body dressing   Upper Body Dressing Assessment/Training   Aladdin Level (Upper Body Dressing) maximum assist (25% patient effort)   Grooming Assessment/Training   Aladdin Level (Grooming) minimum assist (75% patient effort)   Toileting   Aladdin Level (Toileting) minimum assist (75% patient effort)   Clinical Impression   Criteria for Skilled Therapeutic Interventions Met (OT) Yes, treatment indicated   OT Diagnosis Decline in function   OT Problem List-Impairments impacting ADL problems related to;activity tolerance impaired;balance;cognition;mobility;range of motion (ROM);sensation;pain;strength   Assessment of Occupational Performance 5 or more Performance Deficits   Identified Performance Deficits Dressing, bathing, toileting, functional mobility, home mgmt, med mgmt, and other IADL   Planned Therapy Interventions (OT) ADL  retraining;cognition;strengthening;ROM;transfer training;progressive activity/exercise;risk factor education   Clinical Decision Making Complexity (OT) detailed assessment/moderate complexity   Risk & Benefits of therapy have been explained evaluation/treatment results reviewed;care plan/treatment goals reviewed;risks/benefits reviewed;current/potential barriers reviewed;participants voiced agreement with care plan;participants included;patient   OT Total Evaluation Time   OT Eval, Low Complexity Minutes (59536) 10   OT Goals   Therapy Frequency (OT) 5 times/week   OT Predicted Duration/Target Date for Goal Attainment 03/07/24   OT Goals Hygiene/Grooming;Upper Body Dressing;Lower Body Dressing;Toilet Transfer/Toileting;Cognition   OT: Hygiene/Grooming supervision/stand-by assist;using adaptive equipment;within precautions   OT: Upper Body Dressing Supervision/stand-by assist;using adaptive equipment;within precautions   OT: Lower Body Dressing Supervision/stand-by assist;using adaptive equipment;within precautions   OT: Toilet Transfer/Toileting Supervision/stand-by assist;toilet transfer;cleaning and garment management;using adaptive equipment;within precautions   OT: Cognitive Patient/caregiver will verbalize understanding of cognitive assessment results/recommendations as needed for safe discharge planning   Therapeutic Procedures/Exercise   Therapeutic Procedure: strength, endurance, ROM, flexibillity minutes (01323) 10   Symptoms Noted During/After Treatment fatigue;increased pain   Treatment Detail/Skilled Intervention L UE ROM completed to preserve and promote functoin for future ADL/IADL. Patient very guarded at L UE, requires max VCs and TCs to loosen up at L UE and allow for ROM. Attempting AROM w/ elbow flex/ext, but pt tensing up arm in pain. PROM completed elbow flex/extension working into full range. Pt reporting improved pain w/ ROM. PROM provided for shoulder ROM, minimal flexion, extension, and  abduction completed. Requires max cues throughout to relax L UE muscles. Reports improved symptoms after L UE ROM. pt w/ L lean and posterior lean at EOB during exercises, requires cues througout to sit upright.   Therapeutic Activities   Therapeutic Activity Minutes (09037) 25   Symptoms noted during/after treatment fatigue;increased pain   Treatment Detail/Skilled Intervention Pt is supine at encounter and agreeable to OT. Providing education on orthopedic and neurosurg recommendation to pt and spouse as it relates to mobility and ADL. Edu on avoiding bend/lift/twist at lower back. Edu on encouraged ROM and avoidance of sling for L UE. Edu on log roll tech and pt transfering supine to sitting w/ CGA. CGA at EOB, progressing to SBA w/ VCs to scoot forward and sit upright. STS w/ direct verbal cueing for hand placement on EOB vs FWW, does not follow command and placing both hands on FWW w/ stand, CGA. Min A ambulating w/ FWW to bathroom. Instructing pt x3 on body position and FWW placement as pt ambulating in room and in bathroom. Cues to keep FWW close and centered. Pivoting to toilet w/ FWW, has LOB and requiring min A from therapist to correct. Pt does not seem aware of LOB. Tactile and VC for hand placement on grab bar and to align w/ toilet prior to sit. on/off toilet min A. Cued again alignment w/ chair when sitting in recliner. Provided education on TCU recommendation given deficits. Pt and spouse agreeable.   OT Discharge Planning   OT Plan SLUMS, toilet transfer and toileting, L UE ROM, ADL w/ in spinal precautions   OT Discharge Recommendation (DC Rec) Transitional Care Facility   OT Rationale for DC Rec Patient is presenting below baseline with impaired balance, weakness, activity intolerance, pain. Requiring assistance of 1 for all mobility and ADL at this time. Recommend skilled OT at TCU for progression of ADL/IADL independence.   OT Brief overview of current status Ax1   Total Session Time   Timed  Code Treatment Minutes 35   Total Session Time (sum of timed and untimed services) 45

## 2024-02-29 NOTE — PHARMACY-ADMISSION MEDICATION HISTORY
Pharmacist Admission Medication History    Admission medication history is complete. The information provided in this note is only as accurate as the sources available at the time of the update.    Information Source(s): Patient and Patient's pharmacy via phone and [facility] @ [phone]    Pertinent Information: spoke with patient and [facility] for dual info, intrathecal Pump has been updated by tatinana Keller @ [facility]    Changes made to PTA medication list:  Added: adderall  Deleted: eliquis, fml, liquifilm, flonase, protonix  Changed: wellbutrin XL, buspar, Imdur, KCL, torsemide    Allergies reviewed with patient and updates made in EHR: yes    Medication History Completed By: [facility] 2/28/2024 7:01 PM    Prior to Admission medications    Medication Sig Last Dose Taking? Auth Provider Long Term End Date   allopurinol (ZYLOPRIM) 300 MG tablet TAKE 1 TABLET(300 MG) BY MOUTH EVERY DAY Strength: 300 mg 2/27/2024 Yes Reported, Patient     amphetamine-dextroamphetamine (ADDERALL) 20 MG tablet Take 20 mg by mouth daily 2/28/2024 at am Yes Unknown, Entered By History     atorvastatin (LIPITOR) 40 MG tablet Take 40 mg by mouth daily Past Week at hs Yes Reported, Patient     B Complex CAPS Take 1 tablet by mouth daily Past Week Yes Reported, Patient     buPROPion (WELLBUTRIN XL) 150 MG 24 hr tablet Take 300 mg by mouth every morning 2/28/2024 Yes Reported, Patient Yes    busPIRone (BUSPAR) 7.5 MG tablet Take 10 mg by mouth 3 times daily 2/28/2024 at x1 Yes Unknown, Entered By History Yes    chlorhexidine (PERIDEX) 0.12 % solution Swish and spit 15 mLs in mouth 2 times daily as needed prn Yes Reported, Patient     dicyclomine (BENTYL) 20 MG tablet Take 20 mg by mouth 3 times daily as needed prn Yes Unknown, Entered By History     DULoxetine (CYMBALTA) 30 MG capsule Take 60 mg by mouth every morning 2/28/2024 at am Yes Reported, Patient Yes    finasteride (PROSCAR) 5 MG tablet Take 1  tablet by mouth daily 2/27/2024 at hs Yes Reported, Patient     FLOMAX 0.4 MG OR CP24 1 TABLET DAILY AFTER A MEAL 2/27/2024 at hs Yes Jakob Conner MD     isosorbide mononitrate (IMDUR) 60 MG 24 hr tablet Take 90 mg by mouth daily 2/28/2024 Yes Unknown, Entered By History     lidocaine (XYLOCAINE) 5 % external ointment APPLY TOPICALLY TO THE AFFECTED AREA THREE TIMES DAILY AS NEEDED  Yes Reported, Patient     losartan (COZAAR) 100 MG tablet Take 0.5 tablets (50 mg) by mouth daily 2/28/2024 Yes Adan Rincon MD Yes    medication given by implanted intrathecal pump continuous Drug # 1: Bupivacaine (Marcaine)  - Conc:25 mg/mL - Total Dose / 24 hours: 17.52 mg    Drug # 2: Hydromorphone (Dilaudid)  - Conc:0.8 mg/mL - Total Dose / 24 hours: 0.56 mg    Drug # 3: Fentanyl (Sublimaze) - Conc: 1817.5 mcg/mL - Total Dose / 24 hours: 1273.9 mcg    Diluent: NS    May give boluses every 2 hours for up to 3 boluses per day of hydromorphone at 0.1982mg, fentanyl at 45 mcg and bupivacaine 0.619 mg in a 24 hour period   Pump Reservoir Volume: 40 mL  Outside Clinic & Provider: [facility]   Last Refill Date: 2/27/24  Next Refill Date: 4/16/2024  Low Nadine Alarm Date: 1/20/2024  Pump : [place]     Please consider consulting the pain team if the patient is admitted with an IT pump.  Yes Unknown, Entered By History     metoprolol succinate ER (TOPROL XL) 25 MG 24 hr tablet Take 1 tablet (25 mg) by mouth daily 2/28/2024 Yes Adan Rincon MD Yes    MULTIVITAMINS OR TABS 1 tablet daily  Yes Jakob Conner MD     NARCAN 4 MG/0.1ML nasal spray once as needed prn Yes Reported, Patient Yes    nitroGLYcerin (NITROSTAT) 0.4 MG sublingual tablet DISSOLVE 1 TABLET UNDER THE TONGUE AS NEEDED FOR CHEST PAIN EVERY 5 MINUTES AS NEEDED AS DIRECTED prn Yes Reported, Patient Yes    ondansetron (ZOFRAN ODT) 4 MG ODT tab DISSOLVE 1 TABLET(4 MG) ON THE TONGUE EVERY 8 HOURS AS NEEDED FOR NAUSEA OR  VOMITING  Yes Reported, Patient     Potassium Chloride MONICA Take 20 mEq by mouth 2 times daily 2/28/2024 at x1 Yes Reported, Patient     torsemide (DEMADEX) 10 MG tablet Take 20 mg by mouth daily 2/28/2024 Yes Reported, Patient Yes    traZODone (DESYREL) 50 MG tablet Take 75 mg by mouth at bedtime 2/27/2024 at hs Yes Reported, Patient

## 2024-02-29 NOTE — CONSULTS
Care Management Initial Consult    General Information  Assessment completed with: Spouse or significant other,    Type of CM/SW Visit: Initial Assessment    Primary Care Provider verified and updated as needed: Yes   Readmission within the last 30 days: no previous admission in last 30 days      Reason for Consult: care coordination/care conference, discharge planning       Communication Assessment  Patient's communication style: spoken language (English or Bilingual)    Hearing Difficulty or Deaf: yes   Wear Glasses or Blind: no    Cognitive  Cognitive/Neuro/Behavioral: .WDL except  Level of Consciousness: alert, intermittent confusion  Arousal Level: opens eyes spontaneously  Orientation: disoriented to, time, situation  Mood/Behavior: calm, cooperative  Best Language: 0 - No aphasia  Speech: clear, spontaneous    Living Environment:   People in home: spouse     Current living Arrangements: house           Family/Social Support:  Care provided by: spouse/significant other, self  Provides care for: no one         Current Resources:   Patient receiving home care services: No     Community Resources: None  Equipment currently used at home: cane, straight  Supplies currently used at home: None    Employment/Financial:  Does the patient's insurance plan have a 3 day qualifying hospital stay waiver?  No    Lifestyle & Psychosocial Needs:  Social Determinants of Health     Food Insecurity: Not on file   Depression: Not on file   Housing Stability: Not on file   Tobacco Use: Low Risk  (10/27/2023)    Patient History     Smoking Tobacco Use: Never     Smokeless Tobacco Use: Never     Passive Exposure: Not on file   Financial Resource Strain: Not on file   Alcohol Use: Not on file   Transportation Needs: Not on file   Physical Activity: Not on file   Interpersonal Safety: Not on file   Stress: Not on file   Social Connections: Not on file       Functional Status:  Prior to admission patient needed assistance:   Dependent  "ADLs:: Ambulation-walker  Dependent IADLs:: Transportation                    Additional Information:  CM consulted for care coordination/discharge planning. OT recs TCU at discharge. CM met with patient at the bedside and he shared that he lives with his wife in a single family home. He was agreeable to CM speaking with his wife. CM placed call to wife d/t patient being \"alert and oriented with forgetfulness,\" LVM, awaiting return call.     Addendum 1531: CM spoke with patient's wife Arabella via phone and shared PT/OT recs for TCU at discharge. She is in agreement with this recommendation and would like to send referrals to 1) [facility] 2) [facility], CM sent referrals. Her third choice is [facility] if the first two decline.     Patient's wife shared that they live together in a single family home and stated he is not currently open to any home care services. She stated he does not use oxygen at home but has a CPAP machine he has not been using recently. She stated that he uses a walker to ambulate at baseline and is independent with his ADLs and manages his own medications at baseline. She stated she could provide transport at discharge.       Rosi Cabrera, RN, BSN  Inpatient Care Coordination  [facility]  [phone]        "

## 2024-02-29 NOTE — CONSULTS
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 Ayan Mendes. Patient has a history of chronic pain with indwelling pain pump, recurrent falls, and was on Eliquis PTA for recurrent DVTs. He estimates that his most recent falls was within the past 1-2 weeks. Radiographs of the left shoulder (2/27/24), left humerus (2/28/24), and CT CAP (2/28/24) were reviewed and do not demonstrate any obvious acute fractures or dislocation of the left shoulder. There is an os acromiale and degenerative changes of the left AC joint. CT CAP demonstrates a left shoulder hematoma near the glenohumeral joint, which could also be a component of hemarthrosis. Hgb 8.5 (down from 10.2 on admission), platelets 337, INR 1.87, . Type and screen completed. On exam, the patient has difficulty actively ranging the left shoulder, however, passively I am able to FF and abduct to approximately 100 degrees in both planes with minimal reproduced pain. Repetitive ER and IR of the left shoulder are not painful. Diffuse, extensive ecchymosis to the left shoulder, humeral region, proximal forearm, left anterior chest wall, and axilla with associated hematoma/hemarthrosis to the left anterior shoulder. Areas remain soft and  compressible and without tenderness on this morning's exam. NVI. No evidence of compartment syndrome. Discussed above findings with the patient and while I do feel a majority of his left shoulder limitation is a result of the large hematoma with possible hemarthrosis, I cannot reliably rule out a left shoulder rotator cuff tear on exam alone. Patient has a pain pump and with his pain and confusion, I do not know that he would tolerate an MRI of the left shoulder. Furthermore, this would not need to be done in the acute setting and would not change the treatment course at this time, especially as he is not an ideal surgical candidate and is not interested in surgery. Thus, we will defer any further imaging. Recommend nonoperative, symptomatic management as below. Anticipate gradual improvement of hematoma over the next several weeks to months.     -Continue to hold Eliquis with consideration of discontinuing indefinitely due to recurrent falls. Will defer to medicine team on long-term anticoagulation use or placement of IVC filter.  -Monitor Hgb and transfuse per protocol. Type and screen completed on 2/28/24.  -Analgesics per medicine team. May consider a pain team consult given patient's history of chronic pain and use of a pain pump.  -Strongly encourage frequent use cold compresses to the left shoulder and left chest wall. Will hold off on compression wrap given concern that this would not be tolerated by the patient and could cause skin breakdown.  -WOC consult recommended for left forearm skin tear. Patient also noted to have wounds to the left lower leg on media imaging from 2/16/24.   -Left upper extremity NV checks Q4H.  -Encourage movement of the left shoulder to prevent adhesive capsulitis. Continue with elbow, wrist, and digital ROM. Avoid use of sling.  -Okay for activity as tolerated of the left upper extremity. Okay to use a walker or cane if tolerated.   -PT and OT consults.  -SW for discharge  planning. Will need TCU.  -Follow up with Dr. Ayan Mendes at [facility] in 2-3 weeks for a recheck of the left shoulder, or PRN if no concerns. Please call [phone] to schedule. Ortho trauma to sign off this hospitalization.    Please contact orthopedic trauma team if any questions or concerns arise.           Chief Complaint:   Left shoulder/arm bruising and swelling         History of Present Illness:   Medical history obtained via chart review and discussion with the patient. Patient is intermittently confused during encounter. Eren James is an 78 year old right-hand dominant male with past medical history of recurrent falls, chronic pain with opiate dependence and indwelling pain pump, polypharmacy, recurrent DVT on Eliquis, HTN, HLD, COPD, CAD, BPH, and HFpEF who was admitted on 2/28/24 with recurrent falls and diffuse pain. Patient reports that his last fall was approximately 1-2 weeks ago, however, per chart review his wife notes that he falls almost daily. This includes a fall in the parking lot on 2/26/24 as well as several falls since then. Patient then fell again on 2/28/24 while getting out of a car. Patient was taken to [facility] ED on 2/28/24 at which time he underwent extensive imaging including head CT x 2, CT cervical, thoracic, and lumbar spine (L1 compression fracture), CT CAP (left shoulder hematoma and/or hemarthrosis), CT facial bones, and radiographs of the left humerus and left forearm. Radiographs were negative for fractures. Left shoulder radiographs from 2/27/24 at [facility] were also reviewed. Patient was admitted for monitoring and TCU placement. Currently, the patient reports that he has no pain to his left shoulder and arm at rest. He states that he has difficulty moving the left shoulder, but does demonstrate active elbow, wrist, and digital motion. Denies numbness and tingling to the LUE. Denies SOB or difficulty breathing. No known antecedent left shoulder pain or prior  injuries or surgeries to the left shoulder. Patient reportedly lives with his wife in a single-family home. Patient has a cane and walker at home, but does not use them. Patient no longer drives or routinely ambulates in the community. PTA Eliquis (on hold since admission). No known recent illnesses. Tolerating PO intake and awaiting breakfast.          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     See PSH    ASD (atrial septal defect)     Asthma     BPH (benign prostatic hyperplasia)     CHF (congestive heart failure)     Cholelithiasis     Chronic neck pain     COPD (chronic obstructive pulmonary disease) (H)     Coronary artery disease     Previous coronary angioplasties    Deep vein thrombosis     Depressive disorder     Diverticulitis of colon     Esophageal reflux     Gout     Hyperlipidemia     Hypertension     Hypertrophy of prostate with urinary obstruction and other lower urinary tract symptoms (LUTS)     Kidney stone     Subdural hematoma after fall     SVT (supraventricular tachycardia)     Testicular hypofunction              Past Surgical History:     Past Surgical History:   Procedure Laterality Date    Abdominal artery aneurysm ligation (not aortic aneurysm)      Colon surgery for diverticulitis      COLONOSCOPY      Coronary angiogram with angioplasty      Coronary angiogram with coronary stents placed      CYSTOSCOPY, TRANSURETHRAL RESECTION (TUR) PROSTATE, COMBINED      PARATHYROIDECTOMY      TONSILLECTOMY, ADENOIDECTOMY, COMBINED               Social History:     Social History     Tobacco Use    Smoking status: Never    Smokeless tobacco: Never   Substance Use Topics    Alcohol use: No             Family History:     Family History   Problem Relation Age of Onset    Cardiovascular Mother         living. hx bypass.    Cardiovascular Father         .cardiomegaly.    Diabetes Sister                       Immunizations:     VACCINE/DOSE   Diptheria   DPT   DTAP   HBIG   Hepatitis A   Hepatitis B   HIB   Influenza   Measles   Meningococcal   MMR   Mumps   Pneumococcal   Polio   Rubella   Small Pox   TDAP   Varicella   Zoster             Allergies:     Allergies   Allergen Reactions    Armodafinil Other (See Comments)             Medications:     Current Facility-Administered Medications   Medication    acetaminophen (TYLENOL) tablet 975 mg    allopurinol (ZYLOPRIM) tablet 300 mg    [Held by provider] amphetamine-dextroamphetamine (ADDERALL) per tablet 20 mg    atorvastatin (LIPITOR) tablet 40 mg    buPROPion (WELLBUTRIN XL) 24 hr tablet 300 mg    busPIRone (BUSPAR) tablet 10 mg    calcium carbonate (TUMS) chewable tablet 1,000 mg    diclofenac (VOLTAREN) 1 % topical gel 2 g    DULoxetine (CYMBALTA) DR capsule 60 mg    finasteride (PROSCAR) tablet 5 mg    HYDROmorphone (PF) (DILAUDID) injection 0.5 mg    isosorbide mononitrate (IMDUR) 24 hr tablet 90 mg    Lidocaine (LIDOCARE) 4 % Patch 1 patch    lidocaine (LMX4) cream    lidocaine 1 % 0.1-1 mL    losartan (COZAAR) tablet 50 mg    medication given by implanted intrathecal pump    methocarbamol (ROBAXIN) tablet 750 mg    metoprolol succinate ER (TOPROL XL) 24 hr tablet 25 mg    naloxone (NARCAN) injection 0.2 mg    Or    naloxone (NARCAN) injection 0.4 mg    Or    naloxone (NARCAN) injection 0.2 mg    Or    naloxone (NARCAN) injection 0.4 mg    ondansetron (ZOFRAN ODT) ODT tab 4 mg    Or    ondansetron (ZOFRAN) injection 4 mg    oxyCODONE (ROXICODONE) tablet 5 mg    potassium chloride ER (K-TAB/KLOR-CON) CR tablet 20 mEq    senna-docusate (SENOKOT-S/PERICOLACE) 8.6-50 MG per tablet 1 tablet    Or    senna-docusate (SENOKOT-S/PERICOLACE) 8.6-50 MG per tablet 2 tablet    sodium chloride (PF) 0.9% PF flush 3 mL    sodium chloride (PF) 0.9% PF flush 3 mL    tamsulosin (FLOMAX) capsule 0.4 mg    torsemide (DEMADEX) tablet 20 mg    traZODone (DESYREL) half-tab 75 mg           "   Review of Systems:   CV: NEGATIVE for chest pain, palpitations or peripheral edema  C: NEGATIVE for fever, chills, change in weight  E/M: NEGATIVE for ear, mouth and throat problems  R: NEGATIVE for significant cough or SOB          Physical Exam:   All vitals have been reviewed  Patient Vitals for the past 24 hrs:   BP Temp Temp src Pulse Resp SpO2 Height Weight   02/29/24 0753 (!) 146/63 98.1  F (36.7  C) Temporal 86 18 98 % -- --   02/29/24 0334 118/57 98.2  F (36.8  C) -- 88 18 98 % -- --   02/29/24 0027 -- -- -- -- 18 -- -- --   02/28/24 2330 (!) 155/83 98.6  F (37  C) Temporal 96 18 99 % -- --   02/28/24 2219 103/52 99.6  F (37.6  C) Temporal 93 18 99 % -- --   02/28/24 2009 124/63 99.9  F (37.7  C) Temporal 94 20 97 % -- --   02/28/24 1757 119/75 99.3  F (37.4  C) Temporal 84 18 98 % -- --   02/28/24 1624 -- -- -- 107 -- -- -- --   02/28/24 1600 136/85 -- -- (!) 142 13 -- -- --   02/28/24 1500 (!) 149/94 -- -- (!) 141 12 92 % -- --   02/28/24 1217 (!) 135/101 98.3  F (36.8  C) Oral (!) 129 16 100 % -- --   02/28/24 1209 (!) 140/95 98.3  F (36.8  C) Oral (!) 129 20 -- 1.575 m (5' 2\") 78.8 kg (173 lb 11.2 oz)       Intake/Output Summary (Last 24 hours) at 2/29/2024 0756  Last data filed at 2/28/2024 2358  Gross per 24 hour   Intake --   Output 450 ml   Net -450 ml       Constitutional: Pleasantly confused, alert. Needs significant prompting to follow commands.   HEENT: Head normocephalic. Healing abrasions to the nasal bridge and forehead. Pupils equal round and reactive.  Respiratory: Unlabored breathing no audible wheeze. No retractions.   Cardiovascular: Regular rate and rhythm per pulses.  GI: Abdomen is non-distended.  Lymph/Hematologic: No lymphadenopathy in areas examined.  Genitourinary: No suarez  Skin: No rashes, no cyanosis, no edema.  Musculoskeletal: Left upper extremity: Dressing intact to the left proximal forearm. Extensive ecchymosis to the left shoulder, upper arm extending to the proximal " humeral region, left anterior chest wall, and axilla (spares the armpit). No erythema. Moderate to large hematoma vs hemarthrosis to the left anterior shoulder with overlying ecchymosis. Entirety of hematoma/hemarthrosis, chest wall, shoulder, and humeral region is soft, compressible, and without tenderness. Mildly tender just medial to the left shoulder hematoma. Patient unable/unwilling to actively range the left shoulder. Passively, the patient forward flexes and abducts the left shoulder to approximately 100 degrees with minimal pain reproduced to the lateral distal half of the humeral region. No pain with repetitive ER and IR of the left shoulder with the elbow flexed to 90 degrees. No clunking of the left shoulder with ROM. Patient actively able to flex and extend the elbow and wrist without pain. Able to actively extend, flex, abduct, and adduct the digits. Able to make a complete fist. Left middle finger chronic mallet deformity. AIN and PIN intact. Able to fire the posterior head of the deltoid. Radial pulse 2+. Capillary refill 2 seconds. SILt A/M/R/U nerve distribution.  Neurologic: GCS 15, A&OX2-3 but intermittently confused and given history deviates from that provided by wife in H&P          Data:   All laboratory data reviewed  Results for orders placed or performed during the hospital encounter of 02/28/24   CT Head w/o Contrast     Status: None    Narrative    EXAM: CT HEAD W/O CONTRAST  2/28/2024 2:01 PM     HISTORY:  fall, blunt trauma, head injury       COMPARISON:  CT 2/26/2024    TECHNIQUE: Using multidetector thin collimation helical acquisition  technique, axial, coronal and sagittal CT images from the skull base  to the vertex were obtained without intravenous contrast.   (topogram) image(s) also obtained and reviewed. Dose reduction  techniques were performed.    FINDINGS:  No intracranial hemorrhage, mass effect, or midline shift. No acute  loss of gray-white matter differentiation in  the cerebral hemispheres.  Ventricles are proportionate to the cerebral sulci. Clear basal  cisterns. Moderate diffuse cerebral volume loss. Mild periventricular  hypoattenuation, consistent with chronic small vessel ischemic  disease.    The bony calvaria and the bones of the skull base are normal. The  visualized portions of the paranasal sinuses and mastoid air cells are  clear. Grossly normal orbits. Age-indeterminate left nasal bone  fracture.      Impression    IMPRESSION:   1. No acute intracranial pathology.   2. Moderate diffuse cerebral volume loss and mild chronic small vessel  ischemic disease.  3. Age-indeterminate left nasal fracture.    BARBIE MG MD         SYSTEM ID:  [id number]   CT Cervical Spine w/o Contrast     Status: None    Narrative    EXAM: CT CERVICAL SPINE W/O CONTRAST  2/28/2024 2:15 PM     HISTORY: fall, head injury, eval for trauma       COMPARISON: No prior similar studies    TECHNIQUE: Using multidetector thin collimation helical acquisition  technique, axial, coronal and sagittal CT images through the cervical  spine were obtained without intravenous contrast. Dose reduction  techniques were used.    FINDINGS:  No acute fracture or traumatic subluxation. No prevertebral edema.    Mild anterolisthesis at C4-5 and C7-T1 and mild retrolisthesis at  C6-7.    Partial anterior bony fusion C2-C6. Near complete dorsolateral bony  fusion on the left C2-T1 and on the right C2-C5 and C7-T1.    The findings on a level by level basis are as follows:    C2-C3: Bilateral facet arthropathy. No significant spinal canal or  neural foraminal stenosis.     C3-C4: Right eccentric uncovertebral spurring and facet hypertrophy.  Mild to moderate right neural foraminal stenosis. No spinal canal or  left neural foraminal stenosis.    C4-C5: Left eccentric uncovertebral spurring and facet hypertrophy.  Moderate left neural foraminal stenosis. No spinal canal or right  neural frontal stenosis.    C5-C6:  Left eccentric uncovertebral spurring and bilateral facet  hypertrophy. Mild left neural foraminal stenosis. No spinal canal or  right neural foraminal stenosis.    C6-C7: Right eccentric uncovertebral spurring and bilateral facet  hypertrophy. Moderate right and mild to moderate left neural foraminal  stenosis. No spinal canal stenosis.    C7-T1: No significant spinal canal or neural foraminal stenosis.      Impression    IMPRESSION:  1. No acute fracture or posttraumatic subluxation.  2. Multilevel cervical spondylosis as detailed above.     BARBIE MG MD         SYSTEM ID:  [id number]   CT Thoracic Spine w/o Contrast     Status: None    Narrative    THORACIC AND LUMBAR SPINE CT WITHOUT CONTRAST  2/28/2024 2:01 PM    HISTORY: Fall, trauma; evaluate for injury.    COMPARISON: CT chest, abdomen and pelvis 12/4/2023.    TECHNIQUE: Axial, coronal, and sagittal multiplanar reconstructions  obtained from acquisition of thoracic and lumbar spine CT scan.    FINDINGS:  Thoracic spine: There is no acute fracture or subluxation. There is no  prevertebral edema.     Mild stepwise anterolisthesis T1-T3. Exaggeration of thoracic  kyphosis.    Multilevel degenerative changes with loss of disc height, osteophyte  formation and facet hypertrophy. Bony overgrowth on the right at  T12-L1 results in moderate neural foraminal stenosis. No high-grade  spinal canal or neural foraminal stenosis in the remainder of the  thoracic spine.    No soft tissue abnormality in the visualized paraspinous tissues  anteriorly.    Spinal stimulator enters the spinal canal from L1-L2 interspace with  the tip at mid T7 level.    Lumbar Spine: There are five type lumbar vertebrae, used for the  purposes of this dictation.     There is new compression fracture of L1 with mild anterior vertebral  body height loss.    Right convex scoliosis of the lumbar spine with the apex at L1. There  is complete anterior bony fusion at L4-L5 and partial anterior  bony  fusion at L2-L3. Complete dorsolateral bony fusion on the left at  L2-L3.    Multilevel disc height narrowing, disc degeneration, osteophyte  formation and facet hypertrophy. On a level by level basis, the  findings are as follows:    L1-L2:  Left eccentric disc osteophyte complex and facet hypertrophy.  Mild right and moderate left neural foraminal stenosis. No spinal  canal stenosis.    L2-L3:  Posterior osteophyte formation and bilateral facet  hypertrophy. Mild bilateral neural foraminal stenosis. Mild spinal  canal stenosis.    L3-L4:  Disc bulge, osteophyte formation and bilateral facet  hypertrophy. Moderate to severe right and mild left neural foraminal  stenosis. No spinal canal stenosis.    L4-L5:  Posterior osteophyte formation and bilateral facet  hypertrophy. Mild to moderate right and mild left neural foraminal  stenosis. No spinal canal stenosis.    L5-S1:  Disc osteophyte complex and bilateral facet hypertrophy.  Severe bilateral neural foraminal stenosis. Mild spinal canal  stenosis.    The visualized paraspinous tissues anteriorly are unremarkable.      Impression    IMPRESSION:   1. Thoracic spine:   a. No acute fracture or subluxation.  b. Multilevel spondylosis without high-grade spinal canal stenosis.  Moderate to severe right neural foraminal stenosis at T12-L1 secondary  to bone overgrowth.      2. Lumbar Spine:   a. Compression fracture of L1 with mild anterior vertebral body height  loss, new compared to 12/4/2023 and likely acute. No retropulsion of  the fractured vertebral body.  b. Multilevel lumbar spondylosis as above.    BARBIE MG MD         SYSTEM ID:  [id number]   CT Lumbar Spine w/o Contrast     Status: None    Narrative    THORACIC AND LUMBAR SPINE CT WITHOUT CONTRAST  2/28/2024 2:01 PM    HISTORY: Fall, trauma; evaluate for injury.    COMPARISON: CT chest, abdomen and pelvis 12/4/2023.    TECHNIQUE: Axial, coronal, and sagittal multiplanar reconstructions  obtained  from acquisition of thoracic and lumbar spine CT scan.    FINDINGS:  Thoracic spine: There is no acute fracture or subluxation. There is no  prevertebral edema.     Mild stepwise anterolisthesis T1-T3. Exaggeration of thoracic  kyphosis.    Multilevel degenerative changes with loss of disc height, osteophyte  formation and facet hypertrophy. Bony overgrowth on the right at  T12-L1 results in moderate neural foraminal stenosis. No high-grade  spinal canal or neural foraminal stenosis in the remainder of the  thoracic spine.    No soft tissue abnormality in the visualized paraspinous tissues  anteriorly.    Spinal stimulator enters the spinal canal from L1-L2 interspace with  the tip at mid T7 level.    Lumbar Spine: There are five type lumbar vertebrae, used for the  purposes of this dictation.     There is new compression fracture of L1 with mild anterior vertebral  body height loss.    Right convex scoliosis of the lumbar spine with the apex at L1. There  is complete anterior bony fusion at L4-L5 and partial anterior bony  fusion at L2-L3. Complete dorsolateral bony fusion on the left at  L2-L3.    Multilevel disc height narrowing, disc degeneration, osteophyte  formation and facet hypertrophy. On a level by level basis, the  findings are as follows:    L1-L2:  Left eccentric disc osteophyte complex and facet hypertrophy.  Mild right and moderate left neural foraminal stenosis. No spinal  canal stenosis.    L2-L3:  Posterior osteophyte formation and bilateral facet  hypertrophy. Mild bilateral neural foraminal stenosis. Mild spinal  canal stenosis.    L3-L4:  Disc bulge, osteophyte formation and bilateral facet  hypertrophy. Moderate to severe right and mild left neural foraminal  stenosis. No spinal canal stenosis.    L4-L5:  Posterior osteophyte formation and bilateral facet  hypertrophy. Mild to moderate right and mild left neural foraminal  stenosis. No spinal canal stenosis.    L5-S1:  Disc osteophyte complex  and bilateral facet hypertrophy.  Severe bilateral neural foraminal stenosis. Mild spinal canal  stenosis.    The visualized paraspinous tissues anteriorly are unremarkable.      Impression    IMPRESSION:   1. Thoracic spine:   a. No acute fracture or subluxation.  b. Multilevel spondylosis without high-grade spinal canal stenosis.  Moderate to severe right neural foraminal stenosis at T12-L1 secondary  to bone overgrowth.      2. Lumbar Spine:   a. Compression fracture of L1 with mild anterior vertebral body height  loss, new compared to 12/4/2023 and likely acute. No retropulsion of  the fractured vertebral body.  b. Multilevel lumbar spondylosis as above.    BARBIE MG MD         SYSTEM ID:  [id number]   CT Chest/Abdomen/Pelvis w Contrast     Status: None    Narrative    CT CHEST/ABDOMEN/PELVIS WITH CONTRAST 2/28/2024 2:12 PM    CLINICAL HISTORY: Fall, trauma, evaluate for injury.    TECHNIQUE: CT scan of the chest, abdomen, and pelvis was performed  following injection of IV contrast. Multiplanar reformats were  obtained. Dose reduction techniques were used.     CONTRAST: 86mL [id number]    COMPARISON: CT chest, abdomen and pelvis 12/4/2023.    FINDINGS:   LUNGS AND PLEURA: Curvilinear atelectasis or scarring at the right  upper lobe appears stable. A few nodular opacities at right upper lobe  appears stable including a solid 8 mm nodule series 5 image 87. There  are other stable small nodules in this region. No new airspace  disease. Calcified granulomas. No effusion or pneumothorax.    MEDIASTINUM/AXILLAE: No acute mediastinal abnormality. No mediastinal  free fluid or adenopathy identified. Right chest pacemaker.    CORONARY ARTERY CALCIFICATION: Previous intervention (stents or CABG).    HEPATOBILIARY: Normal.    PANCREAS: Normal.    SPLEEN: Several calcified granulomas of the spleen. No acute splenic  abnormality.    ADRENAL GLANDS: Normal.    KIDNEYS/BLADDER: No significant mass, stones, or  hydronephrosis. There  are simple or benign cysts. No follow up is needed. Bladder  unremarkable.    BOWEL: No bowel obstruction. No acute inflammatory change. Moderate  stool within the colon. A few colonic diverticula without  diverticulitis.    PELVIC ORGANS: No acute pelvic abnormality.    ADDITIONAL FINDINGS: Scattered vascular calcifications. No suspicious  lymph nodes.    MUSCULOSKELETAL: Sternotomy. Diffuse degenerative changes of the  spine. Posterior lumbar spine postoperative changes. Subacute healed  bilateral rib fractures with callus. No convincing acute displaced  fracture identified. However, there is the new finding of ill-defined  increased density fluid surrounding the left shoulder joint suggestive  of blood products, series 4 image 7-24. This is difficult to measure  but some of the hematoma appears to be within the shoulder joint or  within the muscles surrounding the left shoulder joint. Some  subcutaneous edema of the left lateral chest wall extending inferiorly  to the left upper abdomen.      Impression    IMPRESSION:  1.  Increased density material involving the left shoulder joint  worrisome for acute hematoma. This is ill-defined but appears to at  least partially extend to the left shoulder joint region and may be  intramuscular involving the muscle surrounding the left shoulder.  2.  No visible acute displaced fracture identified. Please note that  the structures of the left shoulder are incompletely included for  imaging assessment on this exam.   3.  No other acute abnormality identified.  4.  Stable pulmonary nodular opacities on the right.  5.  Subcutaneous edema at the left lateral chest wall extending to the  left upper abdomen level.       Recommendations for an incidental lung nodule = or > 6mm to 8mm:    Low risk patients: Initial follow-up CT at 6-12 months, then  consider CT at 18-24 months if no change.    High risk patients: Initial follow-up CT at 6-12 months, then CT  at  18-24 months if no change.    *Low Risk: Minimal or absent history of smoking or other known risk  factors.  *Nonsolid (ground-glass) or partly solid nodules may require longer  follow-up to exclude indolent adenocarcinoma.  *Recommendations based on Guidelines for the Management of Incidental  Pulmonary Nodules Detected at CT: From the Fleischner Society 2017,  Radiology 2017.    AUDELIA STILES MD         SYSTEM ID:  [id number]   CT Facial Bones without Contrast     Status: None    Narrative    CT FACIAL BONES WITHOUT CONTRAST 2/28/2024 2:15 PM    History:  fall, trauma, eval for injury    Comparison:  Head CT 2/26/2024      Technique: Using thin collimation multidetector helical acquisition  technique, axial and coronal thin section CT images were reconstructed  through the facial bones. Images were reviewed in bone and soft tissue  windows.    Findings:    Again demonstrated is displaced age indeterminate nasal fracture on  the left. No new fracture. The cribriform plate appears intact.  Alignment of the facial bones appears normal.     There is no hematoma, soft tissue mass or gas visualized within the  orbits. Scattered paranasal sinus mucosal thickening.      Impression    Impression: Again demonstrated is displaced age indeterminate nasal  fracture on the left. No new fracture.     BARBIE MG MD         SYSTEM ID:  [id number]   Humerus XR,  G/E 2 views, left     Status: None    Narrative    HUMERUS LEFT TWO OR MORE VIEWS   2/28/2024 2:17 PM     HISTORY: Fall, pain.    COMPARISON: None.      Impression    IMPRESSION: The humerus appears normal. There is no evidence of  fracture. There is an os acromiale and there are hypertrophic changes  at the pseudoarthrosis between the os acromiale and the acromion  proper. Mild osteoarthrosis of the AC joint. Otherwise negative.    TANISHA BARRERA MD         SYSTEM ID:  [id number]   Radius/Ulna XR,  PA &LAT, left     Status: None    Narrative    FOREARM LEFT 2 VIEWS    2/28/2024 2:17 PM     HISTORY: Fall, arm pain.    COMPARISON: None.      Impression    IMPRESSION: Arterial calcifications. The radius and ulna appear  normal. Positive ulnar variance. 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 There are also degenerative changes at the STT joint and  first CMC joint. There is also chondrocalcinosis in the wrist.    TANISHA BARRERA MD         SYSTEM ID:  [id number]   CT Head w/o Contrast     Status: None    Narrative    EXAM: CT HEAD W/O CONTRAST  LOCATION: [facility]  DATE: 2/28/2024    INDICATION: Anisocoria (R>L), worsening confusion.  COMPARISON: CT head 02/26/2024  TECHNIQUE: Routine CT Head without IV contrast. Multiplanar reformats. Dose reduction techniques were used.    FINDINGS:  INTRACRANIAL CONTENTS: No intracranial hemorrhage, extraaxial collection, or mass effect.  No CT evidence of acute infarct. Mild presumed chronic small vessel ischemic changes. Mild to moderate generalized volume loss. No hydrocephalus.     VISUALIZED ORBITS/SINUSES/MASTOIDS: No intraorbital abnormality. No paranasal sinus mucosal disease. No middle ear or mastoid effusion.    BONES/SOFT TISSUES: Age-indeterminate left nasal bone fracture.      Impression    IMPRESSION:  1.  No CT evidence for acute intracranial process.  2.  Mild chronic microvascular ischemic changes similar to prior.   Comprehensive metabolic panel     Status: Abnormal   Result Value Ref Range    Sodium 135 135 - 145 mmol/L    Potassium 4.5 3.4 - 5.3 mmol/L    Carbon Dioxide (CO2) 25 22 - 29 mmol/L    Anion Gap 16 (H) 7 - 15 mmol/L    Urea Nitrogen 21.6 8.0 - 23.0 mg/dL    Creatinine 1.11 0.67 - 1.17 mg/dL    GFR Estimate 67 >60 mL/min/1.73m2    Calcium 9.0 8.8 - 10.2 mg/dL    Chloride 94 (L) 98 - 107 mmol/L    Glucose 138 (H) 70 - 99 mg/dL    Alkaline Phosphatase 138 40 - 150 U/L    AST 42 0 - 45 U/L    ALT 26 0 - 70 U/L     Protein Total 6.6 6.4 - 8.3 g/dL    Albumin 3.6 3.5 - 5.2 g/dL    Bilirubin Total 0.7 <=1.2 mg/dL   INR     Status: Abnormal   Result Value Ref Range    INR 1.87 (H) 0.85 - 1.15   Partial thromboplastin time     Status: Abnormal   Result Value Ref Range    aPTT 47 (H) 22 - 38 Seconds   Alcohol ethyl     Status: Normal   Result Value Ref Range    Alcohol ethyl <0.01 <=0.01 g/dL   CK Total     Status: Abnormal   Result Value Ref Range     (H) 39 - 308 U/L   CBC with platelets and differential     Status: Abnormal   Result Value Ref Range    WBC Count 9.0 4.0 - 11.0 10e3/uL    RBC Count 3.31 (L) 4.40 - 5.90 10e6/uL    Hemoglobin 10.2 (L) 13.3 - 17.7 g/dL    Hematocrit 29.9 (L) 40.0 - 53.0 %    MCV 90 78 - 100 fL    MCH 30.8 26.5 - 33.0 pg    MCHC 34.1 31.5 - 36.5 g/dL    RDW 12.5 10.0 - 15.0 %    Platelet Count 384 150 - 450 10e3/uL    % Neutrophils 78 %    % Lymphocytes 10 %    % Monocytes 11 %    % Eosinophils 0 %    % Basophils 0 %    % Immature Granulocytes 1 %    NRBCs per 100 WBC 0 <1 /100    Absolute Neutrophils 7.1 1.6 - 8.3 10e3/uL    Absolute Lymphocytes 0.9 0.8 - 5.3 10e3/uL    Absolute Monocytes 1.0 0.0 - 1.3 10e3/uL    Absolute Eosinophils 0.0 0.0 - 0.7 10e3/uL    Absolute Basophils 0.0 0.0 - 0.2 10e3/uL    Absolute Immature Granulocytes 0.1 <=0.4 10e3/uL    Absolute NRBCs 0.0 10e3/uL   UA with Microscopic reflex to Culture     Status: Abnormal    Specimen: Urine, Clean Catch   Result Value Ref Range    Color Urine Light Yellow Colorless, Straw, Light Yellow, Yellow    Appearance Urine Clear Clear    Glucose Urine Negative Negative mg/dL    Bilirubin Urine Negative Negative    Ketones Urine Negative Negative mg/dL    Specific Gravity Urine 1.014 1.003 - 1.035    Blood Urine Negative Negative    pH Urine 7.5 (H) 5.0 - 7.0    Protein Albumin Urine Negative Negative mg/dL    Urobilinogen Urine Normal Normal, 2.0 mg/dL    Nitrite Urine Negative Negative    Leukocyte Esterase Urine Trace (A) Negative     Bacteria Urine Few (A) None Seen /HPF    Mucus Urine Present (A) None Seen /LPF    RBC Urine 1 <=2 /HPF    WBC Urine 1 <=5 /HPF    Squamous Epithelials Urine <1 <=1 /HPF    Narrative    Urine Culture not indicated   Basic metabolic panel     Status: Abnormal   Result Value Ref Range    Sodium 132 (L) 135 - 145 mmol/L    Potassium 3.9 3.4 - 5.3 mmol/L    Chloride 97 (L) 98 - 107 mmol/L    Carbon Dioxide (CO2) 24 22 - 29 mmol/L    Anion Gap 11 7 - 15 mmol/L    Urea Nitrogen 17.2 8.0 - 23.0 mg/dL    Creatinine 0.83 0.67 - 1.17 mg/dL    GFR Estimate 88 >60 mL/min/1.73m2    Calcium 8.1 (L) 8.8 - 10.2 mg/dL    Glucose 85 70 - 99 mg/dL   CBC with platelets     Status: Abnormal   Result Value Ref Range    WBC Count 7.4 4.0 - 11.0 10e3/uL    RBC Count 2.79 (L) 4.40 - 5.90 10e6/uL    Hemoglobin 8.5 (L) 13.3 - 17.7 g/dL    Hematocrit 26.1 (L) 40.0 - 53.0 %    MCV 94 78 - 100 fL    MCH 30.5 26.5 - 33.0 pg    MCHC 32.6 31.5 - 36.5 g/dL    RDW 12.7 10.0 - 15.0 %    Platelet Count 337 150 - 450 10e3/uL   EKG 12-lead, tracing only     Status: None   Result Value Ref Range    Systolic Blood Pressure  mmHg    Diastolic Blood Pressure  mmHg    Ventricular Rate 142 BPM    Atrial Rate 142 BPM    MN Interval 216 ms    QRS Duration 90 ms     ms    QTc 513 ms    P Axis 49 degrees    R AXIS 68 degrees    T Axis 35 degrees    Interpretation ECG       Sinus tachycardia with 1st degree A-V block  Possible Inferior infarct , age undetermined  Abnormal ECG  When compared with ECG of 22-DEC-2023 16:03,  Borderline criteria for Inferior infarct are now Present  Nonspecific T wave abnormality now evident in Inferior leads  Nonspecific T wave abnormality no longer evident in Anterior leads  Unconfirmed report - interpretation of this ECG is computer generated - see medical record for final interpretation  Confirmed by - EMERGENCY ROOM, PHYSICIAN ([id number]),  Nathan Root ([id number]) on 2/28/2024 2:39:55 PM     Adult Type and Screen     Status:  None   Result Value Ref Range    ABO/RH(D) A NEG     Antibody Screen Negative Negative    SPECIMEN EXPIRATION DATE [id number]    CBC with platelets differential     Status: Abnormal    Narrative    The following orders were created for panel order CBC with platelets differential.  Procedure                               Abnormality         Status                     ---------                               -----------         ------                     CBC with platelets and d...[[id number]]  Abnormal            Final result                 Please view results for these tests on the individual orders.   ABO/Rh type and screen     Status: None    Narrative    The following orders were created for panel order ABO/Rh type and screen.  Procedure                               Abnormality         Status                     ---------                               -----------         ------                     Adult Type and Screen[[id number]]                            Edited Result - FINAL        Please view results for these tests on the individual orders.          Attestation:  I have reviewed today's vital signs, notes, medications, labs and imaging with Dr. Ayan Mendes.  Amount of time performed on this consult: 50 minutes.    Joann Tan PA-C  [facility]

## 2024-02-29 NOTE — PROGRESS NOTES
CROSS COVER    Nursing notified of anisocoria (R = 3, L = 2).  Pt seen and examined.  Both are reactive.  Pt has had worsening confusion since arrival.  Was seen by nursing naked and urinating in the yin.  Will check CT head without contrast.  Suspect some degree of sundowning vs withdrawal given his polypharmacy.  Pt asking for fentanyl.  It was explained that we do not give that medication routinely or on the floor, but pt persists insisting that he got it yesterday (though pt was not here yesterday).

## 2024-02-29 NOTE — UTILIZATION REVIEW
Admission Status; Secondary Review Determination       Under the authority of the Utilization Management Committee, the utilization review process indicated a secondary review on the above patient. The review outcome is based on review of the medical records, discussions with staff, and applying clinical experience noted on the date of the review.     (x) Inpatient Status Appropriate - This patient's medical care is consistent with medical management for inpatient care and reasonable inpatient medical practice.     RATIONALE FOR DETERMINATION   78-year-old male presents with recurrent falls, a suspected acute L1 compression fracture, left shoulder hematoma, and multiple ecchymoses. Initial assessment reveals tachypnea, tachycardia, and elevated INR. 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 The patient's wife acknowledges his declining functional status and memory issues, expressing openness to transitional care unit (TCU) placement. Referral to neurology is advised for evaluation of mild cognitive impairment or early dementia. Orthopedic surgery and neurosurgery consultations were obtained for management of the compression fracture and hematoma, with physical therapy and occupational therapy assessments anticipated to guide further care, possibly including TCU placement.  The expected length of stay at the time of admission was more than 2 nights because of the severity of illness, intensity of service provided, and risk for adverse outcome. Inpatient admission is appropriate.         This document was produced using voice recognition software       The information on this document is developed by the utilization review team in order for the business office to ensure compliance. This only denotes  the appropriateness of proper admission status and does not reflect the quality of care rendered.   The definitions of Inpatient Status and Observation Status used in making the determination above are those provided in the CMS Coverage Manual, Chapter 1 and Chapter 6, section 70.4.   Sincerely,   KOBI FAUST MD   System Medical Director   Utilization Management   [facility].

## 2024-02-29 NOTE — CONSULTS
"Neurosurgery Consultation      Date of Service:  02/29/2024  Date of Admission:  2/28/2024  Hospital Day: 2    Assessment/Plan  Eren James is a 78 year old male with a past medical history of chronic pain, COPD, hypertension, hyperlipidemia, DVT on Eliquis, heart failure, and recurrent falls admitted on 2/28/2024 for following in the parking lot of the hospital.  Neurosurgery consulted for L1 fracture seen on CT.    Neuro  # Acute L1 compression fracture  -No role for surgical intervention at this time  -Pain control  -No need for bracing  -PT/OT  -Limit bending lifting twisting of lumbar spine  -Baseline upright lumbar x-rays prior to discharge  -Will have patient follow-up in 3 weeks with clinic wit XR prior  -Neurosurgery to sign off-discharge recs placed, our office will call to coordinate      Clinically Significant Risk Factors Present on Admission          # Hypocalcemia: Lowest Ca = 8.1 mg/dL in last 2 days, will monitor and replace as appropriate      # Coagulation Defect: INR = 1.87 (Ref range: 0.85 - 1.15) and/or PTT = 47 Seconds (Ref range: 22 - 38 Seconds), will monitor for bleeding    # Hypertension: Noted on problem list      # Obesity: Estimated body mass index is 31.77 kg/m  as calculated from the following:    Height as of this encounter: 1.575 m (5' 2\").    Weight as of this encounter: 78.8 kg (173 lb 11.2 oz).               TIME SPENT ON THIS ENCOUNTER   I spent 35 minutes of time on the unit/floor managing the care of Eren James excluding time performing procedures. I have personally reviewed the following data/imaging over the past 24 hours.     The patient was discussed with Dr. Sparks.    Erik Savage, [address] [address]    Tel [phone]    History of Present Illness:  Eren James is a 78 year old male with a past medical history of chronic pain, COPD, hypertension, hyperlipidemia, DVT on Eliquis, heart " "failure, and recurrent falls admitted on 2/28/2024 for following in the parking lot of the hospital.  Neurosurgery consulted for L1 fracture seen on CT.    Patient states that he was in the hospital parking lot getting blood work done when he fell getting in and out of his vehicle.  He states that he fell backwards landing on his bottom and did not hit his head.  However patient has multiple contusions and lacerations on his face indicative of trauma.  When I asked how he got those injuries patient was unable to tell me.  Patient reports that a group of nursing students came to his aid and brought him to the ED.  Patient states that he has a history of falls for some time and his legs \"do what ever they want sometimes\".  He also states that he has long existing neuropathy and posterior leg pain that he sees a pain specialist as an outpatient for, and current management includes pain pump.    Allergies   Allergen Reactions    Armodafinil Other (See Comments)       Current Medications:   acetaminophen  975 mg Oral TID    allopurinol  300 mg Oral Daily    [Held by provider] amphetamine-dextroamphetamine  20 mg Oral Daily    atorvastatin  40 mg Oral Daily    buPROPion  300 mg Oral QAM    busPIRone  10 mg Oral TID    diclofenac  2 g Topical 4x Daily    DULoxetine  60 mg Oral QAM    finasteride  5 mg Oral Daily    isosorbide mononitrate  90 mg Oral Daily    lidocaine  1 patch Transdermal Q24h    losartan  50 mg Oral Daily    metoprolol succinate ER  25 mg Oral Daily    potassium chloride ER  20 mEq Oral BID    sodium chloride (PF)  3 mL Intracatheter Q8H    tamsulosin  0.4 mg Oral Daily    torsemide  20 mg Oral Daily    traZODone  75 mg Oral At Bedtime       PRN Medications:  calcium carbonate, HYDROmorphone, lidocaine 4%, lidocaine (buffered or not buffered), methocarbamol, naloxone **OR** naloxone **OR** naloxone **OR** naloxone, ondansetron **OR** ondansetron, oxyCODONE, senna-docusate **OR** senna-docusate, sodium " "chloride (PF)    Infusions:   medication given by implanted intrathecal pump         Physical Examination:  Vitals: BP (!) 146/63 (BP Location: Right arm)   Pulse 86   Temp 98.1  F (36.7  C) (Temporal)   Resp 18   Ht 1.575 m (5' 2\")   Wt 78.8 kg (173 lb 11.2 oz)   SpO2 98%   BMI 31.77 kg/m    General: Adult male patient, lying in bed, critically-ill  HEENT: Normocephalic, atraumatic, no icterus, oral cavity/oropharynx pink and moist  Cardiac: Adequately perfused  Pulm: Unlabored, expansion symmetric, no retractions or use of accessory muscles  Abdomen: Soft, non-distended  Extremities: Warm, no edema, distal pulses +2, well perfused  Skin: No rash or lesion  Psych: Calm and cooperative    Lumbar spine is not tender to palpation.  But patient endorses an aching in his lower back that worsens when repositioning himself in bed or getting in and out of bed.  Baseline lower extremity neuropathy.  No acute findings  Bilateral patellar 2+/4 and achilles reflex 2+/4. Negative for pain with straight leg raise.     LE muscle strength  Right  Left    Iliopsoas (hip flexion)  5/5  5/5    Quad (knee extension)  5/5  5/5    Hamstring (knee flexion)  5/5  5/5    Gastrocnemius (PF)  5/5  5/5    Tibialis Ant. (DF)  5/5  5/5    EHL  5/5  5/5      Negative Babinski bilaterally. Negative for clonus.   Calves are soft and non-tender bilaterally.  Patient ambulating with walker around the room.    Labs and Imaging:    Results for orders placed or performed during the hospital encounter of 02/28/24 (from the past 24 hour(s))   CBC with platelets differential    Narrative    The following orders were created for panel order CBC with platelets differential.  Procedure                               Abnormality         Status                     ---------                               -----------         ------                     CBC with platelets and d...[[id number]]  Abnormal            Final result                 Please view " results for these tests on the individual orders.   Comprehensive metabolic panel   Result Value Ref Range    Sodium 135 135 - 145 mmol/L    Potassium 4.5 3.4 - 5.3 mmol/L    Carbon Dioxide (CO2) 25 22 - 29 mmol/L    Anion Gap 16 (H) 7 - 15 mmol/L    Urea Nitrogen 21.6 8.0 - 23.0 mg/dL    Creatinine 1.11 0.67 - 1.17 mg/dL    GFR Estimate 67 >60 mL/min/1.73m2    Calcium 9.0 8.8 - 10.2 mg/dL    Chloride 94 (L) 98 - 107 mmol/L    Glucose 138 (H) 70 - 99 mg/dL    Alkaline Phosphatase 138 40 - 150 U/L    AST 42 0 - 45 U/L    ALT 26 0 - 70 U/L    Protein Total 6.6 6.4 - 8.3 g/dL    Albumin 3.6 3.5 - 5.2 g/dL    Bilirubin Total 0.7 <=1.2 mg/dL   INR   Result Value Ref Range    INR 1.87 (H) 0.85 - 1.15   Partial thromboplastin time   Result Value Ref Range    aPTT 47 (H) 22 - 38 Seconds   Alcohol ethyl   Result Value Ref Range    Alcohol ethyl <0.01 <=0.01 g/dL   CK Total   Result Value Ref Range     (H) 39 - 308 U/L   CBC with platelets and differential   Result Value Ref Range    WBC Count 9.0 4.0 - 11.0 10e3/uL    RBC Count 3.31 (L) 4.40 - 5.90 10e6/uL    Hemoglobin 10.2 (L) 13.3 - 17.7 g/dL    Hematocrit 29.9 (L) 40.0 - 53.0 %    MCV 90 78 - 100 fL    MCH 30.8 26.5 - 33.0 pg    MCHC 34.1 31.5 - 36.5 g/dL    RDW 12.5 10.0 - 15.0 %    Platelet Count 384 150 - 450 10e3/uL    % Neutrophils 78 %    % Lymphocytes 10 %    % Monocytes 11 %    % Eosinophils 0 %    % Basophils 0 %    % Immature Granulocytes 1 %    NRBCs per 100 WBC 0 <1 /100    Absolute Neutrophils 7.1 1.6 - 8.3 10e3/uL    Absolute Lymphocytes 0.9 0.8 - 5.3 10e3/uL    Absolute Monocytes 1.0 0.0 - 1.3 10e3/uL    Absolute Eosinophils 0.0 0.0 - 0.7 10e3/uL    Absolute Basophils 0.0 0.0 - 0.2 10e3/uL    Absolute Immature Granulocytes 0.1 <=0.4 10e3/uL    Absolute NRBCs 0.0 10e3/uL   ABO/Rh type and screen    Narrative    The following orders were created for panel order ABO/Rh type and screen.  Procedure                               Abnormality         Status                      ---------                               -----------         ------                     Adult Type and Screen[[id number]]                            Edited Result - FINAL        Please view results for these tests on the individual orders.   Adult Type and Screen   Result Value Ref Range    ABO/RH(D) A NEG     Antibody Screen Negative Negative    SPECIMEN EXPIRATION DATE [id number]    EKG 12-lead, tracing only   Result Value Ref Range    Systolic Blood Pressure  mmHg    Diastolic Blood Pressure  mmHg    Ventricular Rate 142 BPM    Atrial Rate 142 BPM    MN Interval 216 ms    QRS Duration 90 ms     ms    QTc 513 ms    P Axis 49 degrees    R AXIS 68 degrees    T Axis 35 degrees    Interpretation ECG       Sinus tachycardia with 1st degree A-V block  Possible Inferior infarct , age undetermined  Abnormal ECG  When compared with ECG of 22-DEC-2023 16:03,  Borderline criteria for Inferior infarct are now Present  Nonspecific T wave abnormality now evident in Inferior leads  Nonspecific T wave abnormality no longer evident in Anterior leads  Unconfirmed report - interpretation of this ECG is computer generated - see medical record for final interpretation  Confirmed by - EMERGENCY ROOM, PHYSICIAN ([id number]),  Nathan Root ([id number]) on 2/28/2024 2:39:55 PM     CT Head w/o Contrast    Narrative    EXAM: CT HEAD W/O CONTRAST  2/28/2024 2:01 PM     HISTORY:  fall, blunt trauma, head injury       COMPARISON:  CT 2/26/2024    TECHNIQUE: Using multidetector thin collimation helical acquisition  technique, axial, coronal and sagittal CT images from the skull base  to the vertex were obtained without intravenous contrast.   (topogram) image(s) also obtained and reviewed. Dose reduction  techniques were performed.    FINDINGS:  No intracranial hemorrhage, mass effect, or midline shift. No acute  loss of gray-white matter differentiation in the cerebral hemispheres.  Ventricles are proportionate to  the cerebral sulci. Clear basal  cisterns. Moderate diffuse cerebral volume loss. Mild periventricular  hypoattenuation, consistent with chronic small vessel ischemic  disease.    The bony calvaria and the bones of the skull base are normal. The  visualized portions of the paranasal sinuses and mastoid air cells are  clear. Grossly normal orbits. Age-indeterminate left nasal bone  fracture.      Impression    IMPRESSION:   1. No acute intracranial pathology.   2. Moderate diffuse cerebral volume loss and mild chronic small vessel  ischemic disease.  3. Age-indeterminate left nasal fracture.    BARBIE MG MD         SYSTEM ID:  [id number]   CT Thoracic Spine w/o Contrast    Narrative    THORACIC AND LUMBAR SPINE CT WITHOUT CONTRAST  2/28/2024 2:01 PM    HISTORY: Fall, trauma; evaluate for injury.    COMPARISON: CT chest, abdomen and pelvis 12/4/2023.    TECHNIQUE: Axial, coronal, and sagittal multiplanar reconstructions  obtained from acquisition of thoracic and lumbar spine CT scan.    FINDINGS:  Thoracic spine: There is no acute fracture or subluxation. There is no  prevertebral edema.     Mild stepwise anterolisthesis T1-T3. Exaggeration of thoracic  kyphosis.    Multilevel degenerative changes with loss of disc height, osteophyte  formation and facet hypertrophy. Bony overgrowth on the right at  T12-L1 results in moderate neural foraminal stenosis. No high-grade  spinal canal or neural foraminal stenosis in the remainder of the  thoracic spine.    No soft tissue abnormality in the visualized paraspinous tissues  anteriorly.    Spinal stimulator enters the spinal canal from L1-L2 interspace with  the tip at mid T7 level.    Lumbar Spine: There are five type lumbar vertebrae, used for the  purposes of this dictation.     There is new compression fracture of L1 with mild anterior vertebral  body height loss.    Right convex scoliosis of the lumbar spine with the apex at L1. There  is complete anterior bony  fusion at L4-L5 and partial anterior bony  fusion at L2-L3. Complete dorsolateral bony fusion on the left at  L2-L3.    Multilevel disc height narrowing, disc degeneration, osteophyte  formation and facet hypertrophy. On a level by level basis, the  findings are as follows:    L1-L2:  Left eccentric disc osteophyte complex and facet hypertrophy.  Mild right and moderate left neural foraminal stenosis. No spinal  canal stenosis.    L2-L3:  Posterior osteophyte formation and bilateral facet  hypertrophy. Mild bilateral neural foraminal stenosis. Mild spinal  canal stenosis.    L3-L4:  Disc bulge, osteophyte formation and bilateral facet  hypertrophy. Moderate to severe right and mild left neural foraminal  stenosis. No spinal canal stenosis.    L4-L5:  Posterior osteophyte formation and bilateral facet  hypertrophy. Mild to moderate right and mild left neural foraminal  stenosis. No spinal canal stenosis.    L5-S1:  Disc osteophyte complex and bilateral facet hypertrophy.  Severe bilateral neural foraminal stenosis. Mild spinal canal  stenosis.    The visualized paraspinous tissues anteriorly are unremarkable.      Impression    IMPRESSION:   1. Thoracic spine:   a. No acute fracture or subluxation.  b. Multilevel spondylosis without high-grade spinal canal stenosis.  Moderate to severe right neural foraminal stenosis at T12-L1 secondary  to bone overgrowth.      2. Lumbar Spine:   a. Compression fracture of L1 with mild anterior vertebral body height  loss, new compared to 12/4/2023 and likely acute. No retropulsion of  the fractured vertebral body.  b. Multilevel lumbar spondylosis as above.    BARBIE MG MD         SYSTEM ID:  [id number]   CT Lumbar Spine w/o Contrast    Narrative    THORACIC AND LUMBAR SPINE CT WITHOUT CONTRAST  2/28/2024 2:01 PM    HISTORY: Fall, trauma; evaluate for injury.    COMPARISON: CT chest, abdomen and pelvis 12/4/2023.    TECHNIQUE: Axial, coronal, and sagittal multiplanar  reconstructions  obtained from acquisition of thoracic and lumbar spine CT scan.    FINDINGS:  Thoracic spine: There is no acute fracture or subluxation. There is no  prevertebral edema.     Mild stepwise anterolisthesis T1-T3. Exaggeration of thoracic  kyphosis.    Multilevel degenerative changes with loss of disc height, osteophyte  formation and facet hypertrophy. Bony overgrowth on the right at  T12-L1 results in moderate neural foraminal stenosis. No high-grade  spinal canal or neural foraminal stenosis in the remainder of the  thoracic spine.    No soft tissue abnormality in the visualized paraspinous tissues  anteriorly.    Spinal stimulator enters the spinal canal from L1-L2 interspace with  the tip at mid T7 level.    Lumbar Spine: There are five type lumbar vertebrae, used for the  purposes of this dictation.     There is new compression fracture of L1 with mild anterior vertebral  body height loss.    Right convex scoliosis of the lumbar spine with the apex at L1. There  is complete anterior bony fusion at L4-L5 and partial anterior bony  fusion at L2-L3. Complete dorsolateral bony fusion on the left at  L2-L3.    Multilevel disc height narrowing, disc degeneration, osteophyte  formation and facet hypertrophy. On a level by level basis, the  findings are as follows:    L1-L2:  Left eccentric disc osteophyte complex and facet hypertrophy.  Mild right and moderate left neural foraminal stenosis. No spinal  canal stenosis.    L2-L3:  Posterior osteophyte formation and bilateral facet  hypertrophy. Mild bilateral neural foraminal stenosis. Mild spinal  canal stenosis.    L3-L4:  Disc bulge, osteophyte formation and bilateral facet  hypertrophy. Moderate to severe right and mild left neural foraminal  stenosis. No spinal canal stenosis.    L4-L5:  Posterior osteophyte formation and bilateral facet  hypertrophy. Mild to moderate right and mild left neural foraminal  stenosis. No spinal canal stenosis.    L5-S1:   Disc osteophyte complex and bilateral facet hypertrophy.  Severe bilateral neural foraminal stenosis. Mild spinal canal  stenosis.    The visualized paraspinous tissues anteriorly are unremarkable.      Impression    IMPRESSION:   1. Thoracic spine:   a. No acute fracture or subluxation.  b. Multilevel spondylosis without high-grade spinal canal stenosis.  Moderate to severe right neural foraminal stenosis at T12-L1 secondary  to bone overgrowth.      2. Lumbar Spine:   a. Compression fracture of L1 with mild anterior vertebral body height  loss, new compared to 12/4/2023 and likely acute. No retropulsion of  the fractured vertebral body.  b. Multilevel lumbar spondylosis as above.    BARBIE MG MD         SYSTEM ID:  [id number]   CT Chest/Abdomen/Pelvis w Contrast    Narrative    CT CHEST/ABDOMEN/PELVIS WITH CONTRAST 2/28/2024 2:12 PM    CLINICAL HISTORY: Fall, trauma, evaluate for injury.    TECHNIQUE: CT scan of the chest, abdomen, and pelvis was performed  following injection of IV contrast. Multiplanar reformats were  obtained. Dose reduction techniques were used.     CONTRAST: 86mL [id number]    COMPARISON: CT chest, abdomen and pelvis 12/4/2023.    FINDINGS:   LUNGS AND PLEURA: Curvilinear atelectasis or scarring at the right  upper lobe appears stable. A few nodular opacities at right upper lobe  appears stable including a solid 8 mm nodule series 5 image 87. There  are other stable small nodules in this region. No new airspace  disease. Calcified granulomas. No effusion or pneumothorax.    MEDIASTINUM/AXILLAE: No acute mediastinal abnormality. No mediastinal  free fluid or adenopathy identified. Right chest pacemaker.    CORONARY ARTERY CALCIFICATION: Previous intervention (stents or CABG).    HEPATOBILIARY: Normal.    PANCREAS: Normal.    SPLEEN: Several calcified granulomas of the spleen. No acute splenic  abnormality.    ADRENAL GLANDS: Normal.    KIDNEYS/BLADDER: No significant mass, stones, or  hydronephrosis. There  are simple or benign cysts. No follow up is needed. Bladder  unremarkable.    BOWEL: No bowel obstruction. No acute inflammatory change. Moderate  stool within the colon. A few colonic diverticula without  diverticulitis.    PELVIC ORGANS: No acute pelvic abnormality.    ADDITIONAL FINDINGS: Scattered vascular calcifications. No suspicious  lymph nodes.    MUSCULOSKELETAL: Sternotomy. Diffuse degenerative changes of the  spine. Posterior lumbar spine postoperative changes. Subacute healed  bilateral rib fractures with callus. No convincing acute displaced  fracture identified. However, there is the new finding of ill-defined  increased density fluid surrounding the left shoulder joint suggestive  of blood products, series 4 image 7-24. This is difficult to measure  but some of the hematoma appears to be within the shoulder joint or  within the muscles surrounding the left shoulder joint. Some  subcutaneous edema of the left lateral chest wall extending inferiorly  to the left upper abdomen.      Impression    IMPRESSION:  1.  Increased density material involving the left shoulder joint  worrisome for acute hematoma. This is ill-defined but appears to at  least partially extend to the left shoulder joint region and may be  intramuscular involving the muscle surrounding the left shoulder.  2.  No visible acute displaced fracture identified. Please note that  the structures of the left shoulder are incompletely included for  imaging assessment on this exam.   3.  No other acute abnormality identified.  4.  Stable pulmonary nodular opacities on the right.  5.  Subcutaneous edema at the left lateral chest wall extending to the  left upper abdomen level.       Recommendations for an incidental lung nodule = or > 6mm to 8mm:    Low risk patients: Initial follow-up CT at 6-12 months, then  consider CT at 18-24 months if no change.    High risk patients: Initial follow-up CT at 6-12 months, then CT  at  18-24 months if no change.    *Low Risk: Minimal or absent history of smoking or other known risk  factors.  *Nonsolid (ground-glass) or partly solid nodules may require longer  follow-up to exclude indolent adenocarcinoma.  *Recommendations based on Guidelines for the Management of Incidental  Pulmonary Nodules Detected at CT: From the Fleischner Society 2017,  Radiology 2017.    AUDELIA STILES MD         SYSTEM ID:  [id number]   CT Facial Bones without Contrast    Narrative    CT FACIAL BONES WITHOUT CONTRAST 2/28/2024 2:15 PM    History:  fall, trauma, eval for injury    Comparison:  Head CT 2/26/2024      Technique: Using thin collimation multidetector helical acquisition  technique, axial and coronal thin section CT images were reconstructed  through the facial bones. Images were reviewed in bone and soft tissue  windows.    Findings:    Again demonstrated is displaced age indeterminate nasal fracture on  the left. No new fracture. The cribriform plate appears intact.  Alignment of the facial bones appears normal.     There is no hematoma, soft tissue mass or gas visualized within the  orbits. Scattered paranasal sinus mucosal thickening.      Impression    Impression: Again demonstrated is displaced age indeterminate nasal  fracture on the left. No new fracture.     BARBIE MG MD         SYSTEM ID:  [id number]   CT Cervical Spine w/o Contrast    Narrative    EXAM: CT CERVICAL SPINE W/O CONTRAST  2/28/2024 2:15 PM     HISTORY: fall, head injury, eval for trauma       COMPARISON: No prior similar studies    TECHNIQUE: Using multidetector thin collimation helical acquisition  technique, axial, coronal and sagittal CT images through the cervical  spine were obtained without intravenous contrast. Dose reduction  techniques were used.    FINDINGS:  No acute fracture or traumatic subluxation. No prevertebral edema.    Mild anterolisthesis at C4-5 and C7-T1 and mild retrolisthesis at  C6-7.    Partial anterior  bony fusion C2-C6. Near complete dorsolateral bony  fusion on the left C2-T1 and on the right C2-C5 and C7-T1.    The findings on a level by level basis are as follows:    C2-C3: Bilateral facet arthropathy. No significant spinal canal or  neural foraminal stenosis.     C3-C4: Right eccentric uncovertebral spurring and facet hypertrophy.  Mild to moderate right neural foraminal stenosis. No spinal canal or  left neural foraminal stenosis.    C4-C5: Left eccentric uncovertebral spurring and facet hypertrophy.  Moderate left neural foraminal stenosis. No spinal canal or right  neural frontal stenosis.    C5-C6: Left eccentric uncovertebral spurring and bilateral facet  hypertrophy. Mild left neural foraminal stenosis. No spinal canal or  right neural foraminal stenosis.    C6-C7: Right eccentric uncovertebral spurring and bilateral facet  hypertrophy. Moderate right and mild to moderate left neural foraminal  stenosis. No spinal canal stenosis.    C7-T1: No significant spinal canal or neural foraminal stenosis.      Impression    IMPRESSION:  1. No acute fracture or posttraumatic subluxation.  2. Multilevel cervical spondylosis as detailed above.     BARBIE MG MD         SYSTEM ID:  [id number]   Humerus XR,  G/E 2 views, left    Narrative    HUMERUS LEFT TWO OR MORE VIEWS   2/28/2024 2:17 PM     HISTORY: Fall, pain.    COMPARISON: None.      Impression    IMPRESSION: The humerus appears normal. There is no evidence of  fracture. There is an os acromiale and there are hypertrophic changes  at the pseudoarthrosis between the os acromiale and the acromion  proper. Mild osteoarthrosis of the AC joint. Otherwise negative.    TANISHA BARRERA MD         SYSTEM ID:  [id number]   Radius/Ulna XR,  PA &LAT, left    Narrative    FOREARM LEFT 2 VIEWS   2/28/2024 2:17 PM     HISTORY: Fall, arm pain.    COMPARISON: None.      Impression    IMPRESSION: Arterial calcifications. The radius and ulna appear  normal. Positive  ulnar variance. 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 There are also degenerative changes at the STT joint and  first CMC joint. There is also chondrocalcinosis in the wrist.    TANISHA BARRERA MD         SYSTEM ID:  [id number]   UA with Microscopic reflex to Culture    Specimen: Urine, Clean Catch   Result Value Ref Range    Color Urine Light Yellow Colorless, Straw, Light Yellow, Yellow    Appearance Urine Clear Clear    Glucose Urine Negative Negative mg/dL    Bilirubin Urine Negative Negative    Ketones Urine Negative Negative mg/dL    Specific Gravity Urine 1.014 1.003 - 1.035    Blood Urine Negative Negative    pH Urine 7.5 (H) 5.0 - 7.0    Protein Albumin Urine Negative Negative mg/dL    Urobilinogen Urine Normal Normal, 2.0 mg/dL    Nitrite Urine Negative Negative    Leukocyte Esterase Urine Trace (A) Negative    Bacteria Urine Few (A) None Seen /HPF    Mucus Urine Present (A) None Seen /LPF    RBC Urine 1 <=2 /HPF    WBC Urine 1 <=5 /HPF    Squamous Epithelials Urine <1 <=1 /HPF    Narrative    Urine Culture not indicated   CT Head w/o Contrast    Narrative    EXAM: CT HEAD W/O CONTRAST  LOCATION: [facility]  DATE: 2/28/2024    INDICATION: Anisocoria (R>L), worsening confusion.  COMPARISON: CT head 02/26/2024  TECHNIQUE: Routine CT Head without IV contrast. Multiplanar reformats. Dose reduction techniques were used.    FINDINGS:  INTRACRANIAL CONTENTS: No intracranial hemorrhage, extraaxial collection, or mass effect.  No CT evidence of acute infarct. Mild presumed chronic small vessel ischemic changes. Mild to moderate generalized volume loss. No hydrocephalus.     VISUALIZED ORBITS/SINUSES/MASTOIDS: No intraorbital abnormality. No paranasal sinus mucosal disease. No middle ear or mastoid effusion.    BONES/SOFT TISSUES: Age-indeterminate left nasal bone fracture.      Impression    IMPRESSION:  1.  No CT  evidence for acute intracranial process.  2.  Mild chronic microvascular ischemic changes similar to prior.   Basic metabolic panel   Result Value Ref Range    Sodium 132 (L) 135 - 145 mmol/L    Potassium 3.9 3.4 - 5.3 mmol/L    Chloride 97 (L) 98 - 107 mmol/L    Carbon Dioxide (CO2) 24 22 - 29 mmol/L    Anion Gap 11 7 - 15 mmol/L    Urea Nitrogen 17.2 8.0 - 23.0 mg/dL    Creatinine 0.83 0.67 - 1.17 mg/dL    GFR Estimate 88 >60 mL/min/1.73m2    Calcium 8.1 (L) 8.8 - 10.2 mg/dL    Glucose 85 70 - 99 mg/dL   CBC with platelets   Result Value Ref Range    WBC Count 7.4 4.0 - 11.0 10e3/uL    RBC Count 2.79 (L) 4.40 - 5.90 10e6/uL    Hemoglobin 8.5 (L) 13.3 - 17.7 g/dL    Hematocrit 26.1 (L) 40.0 - 53.0 %    MCV 94 78 - 100 fL    MCH 30.5 26.5 - 33.0 pg    MCHC 32.6 31.5 - 36.5 g/dL    RDW 12.7 10.0 - 15.0 %    Platelet Count 337 150 - 450 10e3/uL       All relevant imaging and laboratory values personally reviewed.

## 2024-02-29 NOTE — PROGRESS NOTES
02/29/24 1400   Appointment Info   Signing Clinician's Name / Credentials (PT) Carol Mathur, PT, DPT   Rehab Comments (PT) Spinal precautions   Living Environment   People in Home spouse   Current Living Arrangements house   Home Accessibility stairs to enter home   Number of Stairs, Main Entrance 2   Stair Railings, Main Entrance none   Transportation Anticipated family or friend will provide   Living Environment Comments Patient reports he lives with his wife in a house.   Self-Care   Usual Activity Tolerance good   Current Activity Tolerance moderate   Regular Exercise No   Equipment Currently Used at Home cane, straight   Fall history within last six months yes   Number of times patient has fallen within last six months   (Many falls, about 6 in the past week)   Activity/Exercise/Self-Care Comment Patient reports baseline independence with ADLs.  He states he uses a cane when ambulating outside the home.  Patient has recently given up driving.   General Information   Onset of Illness/Injury or Date of Surgery 02/28/24   Referring Physician Artie Jessica, DO   Patient/Family Therapy Goals Statement (PT) Patient would like to return home at discharge.   Pertinent History of Current Problem (include personal factors and/or comorbidities that impact the POC) 78-year-old male presents with recurrent falls, a suspected acute L1 compression fracture, left shoulder hematoma, and multiple ecchymoses. Initial assessment reveals tachypnea, tachycardia, and elevated INR. 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 The patient's wife acknowledges his declining functional status and memory issues, expressing openness to transitional care unit (TCU) placement. Referral to neurology is advised for evaluation  "of mild cognitive impairment or early dementia. Orthopedic surgery and neurosurgery consultations were obtained for management of the compression fracture and hematoma, with physical therapy and occupational therapy assessments anticipated to guide further care, possibly including TCU placement.   Existing Precautions/Restrictions fall;spinal   Cognition   Affect/Mental Status (Cognition) confused   Orientation Status (Cognition) oriented to;person   Follows Commands (Cognition) follows one-step commands;over 90% accuracy;delayed response/completion;increased processing time needed;repetition of directions required   Pain Assessment   Patient Currently in Pain Yes, see Vital Sign flowsheet  (L shoulder pain)   Integumentary/Edema   Integumentary/Edema Comments Age-related skin changes, large left shoulder hematoma with extensive left anterior chest wall and upper extremity ecchymosis, skin tears to left forearm as well as BLEs   Posture    Posture Forward head position;Protracted shoulders   Range of Motion (ROM)   Range of Motion ROM is WFL   Strength (Manual Muscle Testing)   Strength (Manual Muscle Testing) Able to perform R SLR;Able to perform L SLR;Deficits observed during functional mobility   Bed Mobility   Comment, (Bed Mobility) SBA supine > sit, RUE support on bed rail.   Transfers   Comment, (Transfers) SBA sit <> stand, stands with forward flexed posture.   Gait/Stairs (Locomotion)   Comment, (Gait/Stairs) Patient ambulates 15' with FWW and CGA-minAx1.  Unsteady gait, high falls risk.   Balance   Balance Comments Impaired dynamic balance, frequent falls   Sensory Examination   Sensory Perception Comments Baseline neuropathy from \"knees to feet\", also reports some tingling in B hands   Clinical Impression   Criteria for Skilled Therapeutic Intervention Yes, treatment indicated   PT Diagnosis (PT) Impaired functional mobility   Influenced by the following impairments Impaired balance with frequent falls, " generalized weakness, deconditioning, decreased activity tolerance, confusion/impaired cognition, multiple skin tears as well as large left shoulder hematome with extensive chest ecchymosis   Functional limitations due to impairments Impaired independence with bed mobility, transfers, gait, and stairs   Clinical Presentation (PT Evaluation Complexity) evolving   Clinical Presentation Rationale Clinical judgement, PMH, social support   Clinical Decision Making (Complexity) moderate complexity   Planned Therapy Interventions (PT) balance training;bed mobility training;cryotherapy;gait training;home exercise program;neuromuscular re-education;patient/family education;postural re-education;stair training;strengthening;transfer training;progressive activity/exercise   Risk & Benefits of therapy have been explained evaluation/treatment results reviewed;care plan/treatment goals reviewed;risks/benefits reviewed;participants voiced agreement with care plan;participants included;patient   PT Total Evaluation Time   PT Eval, Moderate Complexity Minutes (35834) 10   Physical Therapy Goals   PT Frequency 5x/week   PT Predicted Duration/Target Date for Goal Attainment 03/06/24   PT Goals Bed Mobility;Transfers;Gait;Stairs   PT: Bed Mobility Independent;Supine to/from sit;Rolling   PT: Transfers Supervision/stand-by assist;Sit to/from stand;Bed to/from chair;Assistive device   PT: Gait Supervision/stand-by assist;150 feet;Rolling walker   PT: Stairs Supervision/stand-by assist;2 stairs   Interventions   Interventions Quick Adds Gait Training;Therapeutic Activity   Therapeutic Activity   Therapeutic Activities: dynamic activities to improve functional performance Minutes (58538) 10   Symptoms Noted During/After Treatment None   Treatment Detail/Skilled Intervention Patient greeted supine in bed, asleep on arrival but wakes to name.  Patient agreeable to PT session.  Patient very talkative, rambling at times.  Intermittent cues  needed to redirect conversation.  Patient cued for ankle pumps and SLRs for circulation, reports recent DVT in left calf.  Patient cued for logroll during supine > sit transfer, utilizing RUE on bed rail to come to sitting.  Sitting balance with SBA, initially leaning posteriorly but improved when cued to scoot forward to position feet on floor.  Patient completes sit <> stand from bed x2 with SBA.  Shoes donned while patient seated at EOB, patient able to slide foot into shoe but assist to tie shoe.  Patient seated in recliner chair at end of session, call light in reach and chair alarm activated.  Speaking with care coordinator at end of visit.   Gait Training   Gait Training Minutes (85363) 17   Symptoms Noted During/After Treatment (Gait Training) fatigue   Treatment Detail/Skilled Intervention Patient ambulates 400 ft with FWW and CGA-minAx1 and negotiates x4 steps with R hand on rail (unable to use L hand on rail due to increased pain with L shoulder elevation).  CGA for step-to pattern on stairs but needing minAx1 when attempting to ascend step with step-over-step pattern.  Patient able to teach back that OT instructed to keep walker in closer proximity to body this morning.  Cues for upright posture and keep feet inside walker with turns/pivots.  Patient able to walk and talk throughout session, does require intermittent cues to attend to obstacles in environment.  No overt loss of balance but patient does appear to be at high risk of falls.   Distance in Feet 15' eval + 400' treatment   Assistive Device (Gait Training) rolling walker   PT Discharge Planning   PT Plan Progress gait with walker (review safety cues for walker management), bed mob via logroll, repeated sit <> stands, stairs per home set-up   PT Discharge Recommendation (DC Rec) Transitional Care Facility   PT Rationale for DC Rec Patient presents significantly below his IND/mod I baseline, currently needing Ax1 for all functional mobility tasks  given impaired balance with frequently falls.  Patient presenting with significant trauma to face as well as L shoulder.  Skin tears and bruising noted on all extremities.  Patient also with acute L1 compression fracture with plan for conservative management.  Patient lives with his wife but at this time appears to require 24 hour Ax1, which wife is unable to provide.  Recommend discharge to TCU to promote safety and independence with mobility.  Ultimately, patient would benefit from more suppportive environment which minimizes need to complete stairs or negotiate uneven surfaces.  Patient is a high risk of falls, should continue to use FWW and walk with Ax1.   PT Brief overview of current status Ax1 FWW, unsteady, don shoes   PT Equipment Needed at Discharge walker, rolling   Total Session Time   Timed Code Treatment Minutes 27   Total Session Time (sum of timed and untimed services) 37

## 2024-02-29 NOTE — PROVIDER NOTIFICATION
[id number] paged Dr. Rincon    Pt BP 86/42 with pulse of 80. Pt up in chair chatting. Orders?      ml bolus order and infusing.

## 2024-03-01 ENCOUNTER — APPOINTMENT (OUTPATIENT)
Dept: PHYSICAL THERAPY | Facility: CLINIC | Age: 79
DRG: 543 | End: 2024-03-01
Payer: MEDICARE

## 2024-03-01 ENCOUNTER — APPOINTMENT (OUTPATIENT)
Dept: OCCUPATIONAL THERAPY | Facility: CLINIC | Age: 79
DRG: 543 | End: 2024-03-01
Payer: MEDICARE

## 2024-03-01 ENCOUNTER — APPOINTMENT (OUTPATIENT)
Dept: GENERAL RADIOLOGY | Facility: CLINIC | Age: 79
DRG: 543 | End: 2024-03-01
Attending: NURSE PRACTITIONER
Payer: MEDICARE

## 2024-03-01 LAB
FOLATE SERPL-MCNC: 7.4 NG/ML (ref 4.6–34.8)
VIT B12 SERPL-MCNC: 652 PG/ML (ref 232–1245)

## 2024-03-01 PROCEDURE — 250N000011 HC RX IP 250 OP 636: Performed by: NURSE PRACTITIONER

## 2024-03-01 PROCEDURE — 99232 SBSQ HOSP IP/OBS MODERATE 35: CPT | Performed by: INTERNAL MEDICINE

## 2024-03-01 PROCEDURE — 250N000013 HC RX MED GY IP 250 OP 250 PS 637: Performed by: NURSE PRACTITIONER

## 2024-03-01 PROCEDURE — 250N000011 HC RX IP 250 OP 636: Performed by: INTERNAL MEDICINE

## 2024-03-01 PROCEDURE — 72100 X-RAY EXAM L-S SPINE 2/3 VWS: CPT

## 2024-03-01 PROCEDURE — 120N000001 HC R&B MED SURG/OB

## 2024-03-01 PROCEDURE — 97530 THERAPEUTIC ACTIVITIES: CPT | Mod: GP

## 2024-03-01 PROCEDURE — 99223 1ST HOSP IP/OBS HIGH 75: CPT | Mod: AI | Performed by: NURSE PRACTITIONER

## 2024-03-01 PROCEDURE — 97535 SELF CARE MNGMENT TRAINING: CPT | Mod: GO

## 2024-03-01 PROCEDURE — 62367 ANALYZE SPINE INFUS PUMP: CPT | Performed by: NURSE PRACTITIONER

## 2024-03-01 PROCEDURE — 250N000013 HC RX MED GY IP 250 OP 250 PS 637: Performed by: INTERNAL MEDICINE

## 2024-03-01 RX ORDER — HYDROMORPHONE HYDROCHLORIDE 1 MG/ML
0.5 INJECTION, SOLUTION INTRAMUSCULAR; INTRAVENOUS; SUBCUTANEOUS EVERY 6 HOURS PRN
Status: DISCONTINUED | OUTPATIENT
Start: 2024-03-01 | End: 2024-03-03 | Stop reason: HOSPADM

## 2024-03-01 RX ORDER — HYDROMORPHONE HYDROCHLORIDE 2 MG/1
2 TABLET ORAL EVERY 4 HOURS PRN
Status: DISCONTINUED | OUTPATIENT
Start: 2024-03-01 | End: 2024-03-03 | Stop reason: HOSPADM

## 2024-03-01 RX ADMIN — DULOXETINE HYDROCHLORIDE 60 MG: 60 CAPSULE, DELAYED RELEASE ORAL at 08:52

## 2024-03-01 RX ADMIN — ISOSORBIDE MONONITRATE 90 MG: 30 TABLET, EXTENDED RELEASE ORAL at 08:58

## 2024-03-01 RX ADMIN — HYDROMORPHONE HYDROCHLORIDE 0.5 MG: 1 INJECTION, SOLUTION INTRAMUSCULAR; INTRAVENOUS; SUBCUTANEOUS at 02:52

## 2024-03-01 RX ADMIN — HYDROMORPHONE HYDROCHLORIDE 0.5 MG: 1 INJECTION, SOLUTION INTRAMUSCULAR; INTRAVENOUS; SUBCUTANEOUS at 09:08

## 2024-03-01 RX ADMIN — ATORVASTATIN CALCIUM 40 MG: 40 TABLET, FILM COATED ORAL at 22:42

## 2024-03-01 RX ADMIN — LIDOCAINE 1 PATCH: 4 PATCH TOPICAL at 19:38

## 2024-03-01 RX ADMIN — BUSPIRONE HYDROCHLORIDE 10 MG: 10 TABLET ORAL at 13:38

## 2024-03-01 RX ADMIN — Medication 4 G: at 19:43

## 2024-03-01 RX ADMIN — Medication 4 G: at 17:36

## 2024-03-01 RX ADMIN — HYDROMORPHONE HYDROCHLORIDE 0.5 MG: 1 INJECTION, SOLUTION INTRAMUSCULAR; INTRAVENOUS; SUBCUTANEOUS at 23:23

## 2024-03-01 RX ADMIN — FINASTERIDE 5 MG: 5 TABLET, FILM COATED ORAL at 08:52

## 2024-03-01 RX ADMIN — Medication 4 G: at 12:16

## 2024-03-01 RX ADMIN — ACETAMINOPHEN 975 MG: 325 TABLET, FILM COATED ORAL at 19:35

## 2024-03-01 RX ADMIN — POTASSIUM CHLORIDE 20 MEQ: 750 TABLET, FILM COATED, EXTENDED RELEASE ORAL at 08:52

## 2024-03-01 RX ADMIN — TAMSULOSIN HYDROCHLORIDE 0.4 MG: 0.4 CAPSULE ORAL at 08:52

## 2024-03-01 RX ADMIN — METOPROLOL SUCCINATE 25 MG: 25 TABLET, EXTENDED RELEASE ORAL at 09:19

## 2024-03-01 RX ADMIN — ACETAMINOPHEN 975 MG: 325 TABLET, FILM COATED ORAL at 13:38

## 2024-03-01 RX ADMIN — METHOCARBAMOL 750 MG: 750 TABLET ORAL at 19:36

## 2024-03-01 RX ADMIN — HYDROMORPHONE HYDROCHLORIDE 2 MG: 2 TABLET ORAL at 12:15

## 2024-03-01 RX ADMIN — BUSPIRONE HYDROCHLORIDE 10 MG: 10 TABLET ORAL at 08:52

## 2024-03-01 RX ADMIN — ALLOPURINOL 300 MG: 300 TABLET ORAL at 08:52

## 2024-03-01 RX ADMIN — TRAZODONE HYDROCHLORIDE 75 MG: 50 TABLET ORAL at 23:23

## 2024-03-01 RX ADMIN — ACETAMINOPHEN 975 MG: 325 TABLET, FILM COATED ORAL at 08:52

## 2024-03-01 RX ADMIN — DICLOFENAC SODIUM 2 G: 10 GEL TOPICAL at 09:08

## 2024-03-01 RX ADMIN — BUPROPION 300 MG: 150 TABLET, EXTENDED RELEASE ORAL at 08:52

## 2024-03-01 RX ADMIN — POTASSIUM CHLORIDE 20 MEQ: 750 TABLET, FILM COATED, EXTENDED RELEASE ORAL at 19:37

## 2024-03-01 RX ADMIN — BUSPIRONE HYDROCHLORIDE 10 MG: 10 TABLET ORAL at 19:37

## 2024-03-01 ASSESSMENT — ACTIVITIES OF DAILY LIVING (ADL)
ADLS_ACUITY_SCORE: 34

## 2024-03-01 NOTE — CONSULTS
[facility] ACUTE PAIN SERVICE CONSULTATION   [facility]   Vilma Luong    Date of Admission:  2/28/2024  Date of Consult (When I saw the patient): 03/01/24  Physician requesting consult: Artie Jessica DO   Service:hospitalist  Reason for consult: acute on chronic pain in patient with IT pump      Assessment/Plan:     Eren James is a 78 year old male who was admitted on 2/28/2024  following several fall with associated generalized weakness, functional decline, and mild cognitive impairment.  I was asked to see the patient for acute on chronic pain in patient with IT pump  with pain type musculoskeletal and neuropathic.  As result of the falls, he sustained an  L1 vertebrae with pre-existing multilevel spondylolisthesis high-grade stenosis T12 and L1 as well as left shoulder hematoma multiple ecchymosis over the face trunk hips.  He has a complex medical history of coronary artery disease, congestive heart failure, SVT, atrial septal defect, COPD, depression, anxiety, kidney stone, depressive disorder.  Currently, he is having.  Of hypotension tachycardia. Patient reports reports that his chronic pain other than increased shoulder pain is fairly well-controlled.  He does not have access to using the bolus doses for his intrathecal pump and states the oxycodone gives him loopy feeling.  Will try transitioning to Dilaudid at a low dose for pain complaints to see if this is better tolerated.  The patient was not confused today but is having trouble with managing and understanding his complex medical issues and what the next steps are.  He also reports at home he is has trouble managing the device that gives him the bolus doses therefore, recommending he has only controlled continuous release in the IT pump settings.  He recognizes his functional decline and challenges with managing at this point but I do not think understands the impact his cognitive decline has had in this  "area.  He describes pain as sharp, traveling, numbness tingling in lower extremities with location noted above as with numeric rating of 5/10. The patient denies dizziness lightheadedness, chest pain or tightness, shortness of breath, localized leg pain, redness, constipation or dysuria. The patient does not smoke and has no chemical dependency history, has an established high tolerance with history of opioid dependence.    Opioid Induced Respiratory Depression Risk Assessment: High in setting of age, complex medical history, COPD  CrCl is 64.6 mg/dl\"?   [place] pulled from system on 03/01/24. Last refill on 10/30/2023 #15 tabs hydrocodone he is also utilized tramadol in the past 12 months.  Refills of intrathecal pump are not noted on the . Does supports analgesic of opioid use.   Chronic opioid use = intrathecal pump noted below mg MME,       IT pump setting: Fentanyl 1273.9 mcg/day (53.1 mcg/hr), Bupivacaine 17.552 mg/day 9 0.730 mg/hr), Dilaudid, 0.5670 mg/day (0.2336 mg/hr)   Bolus max 3 /day, over 2 minutes lock out 2 hours (not using), 45.0 mcg, Bupivacaine 0.619 mg, Dilaudid 0.99326 mg.last bolus dose 2/27 15:08 PM  Non critical Alarm  4/16/24  Rand pump 40 ml, amount in reservoir 38.1 mL    Opioid used this past 24 hours in the form of oral 0,  IV hydromorphone 0.5 mg (5) doses.   Adjuvants this past 24 hours buspirone 10 mg (3), acetaminophen 975 mg (3), methocarbamol 750 mg (1).      PLAN:   1) Pain is consistent with musculoskeletal neuropathic, secondary to chronic pain, in the setting of falls. Treatment plan includes multimodal pain approach, medications as listed below, reviewed.  Discharge medications, advised keeping track of doses, educated on tapering off as pain improves, watch for constipation and stool softeners, no driving or alcohol with opioids, store medications in a safe place, advised to take no more than the prescribed dose as increased doses may cause respiratory depression or " "death. Patient is understanding of the plan. All questions and concerns addressed to patient's satisfaction.     2)Multimodal Medication Therapy  Topical:Lidocaine patch 4% and Diclofenac gel Apply to upper back neck shoulders, lidocaine patches to low back.   Adjuvants:Tylenol scheduled\", Hydroxyzine not indicated or recommended given age Muscle relaxer's methocarbamol as as needed. \"   Antidepressants/anxiolytics: Continue chronic buspirone, duloxetine, bupropion.  Noted Adderall currently being held due to tachycardia episodes    Opioids:Oxycodone stop and Hydromorphone(Dilaudid) 2 mg every 6 hours as needed as trial  IV Pain medication:  \"Dilaudid 0.5 mg every 3 hours as needed change from every 2 hours as needed\",, \"Try to minimize & give po first and wean off in preparation for discharge to TCU    3) Non-medication interventions- Ice, Heat, physical therapy, distraction aroma therapy  4) Interdisciplinary team care appreciate input from rehabilitative services  5)Constipation Prophylaxis- senna and miralax . Continue to monitor.   6) Follow up   -acute pain team will continue to follow  -Opioid prescriber has been [facility] pain clinic. PCP is Jakob Conner. b    -Discharge Recommendations - We recommend prescribing the following at the time of discharge:    Continue chronic pain plan and IT pump at current settings  Dilaudid 2 mg every 4 hours as needed limit 3 doses per day  Would avoid use of bolus settings on IT pump.  Disposition: TCU    Prescribing at discharge: Hospitalist       History of Present Illness (HPI):       Eren James is a 78 year old  male with a past medical history of noted above and presents with recurrent falls, functional and cognitive decline with c/o bilateral shoulder and upper back pain. He has chronic pain with lumbar radiculopathy and has an IT pump. Upon presentation to the ED imaging noted new L1 compression fracture. Has multiple ecchymotic are over upper body and " "hips, no fracture of shoulders or cervical . CT o fhead noted age determinate left nasal fracture no intracranial pathology and moderate diffuse volume loss with mild chronic small vessel disease. The patient reports pain that is located acutely in the neck and  shoulder and chronically in low back and legs.  Alleviating factors include medications  and exacerbates include movement.  Current pain is rated at 5/10 and goal is 4/10.  Per [place]  review noted above. The patient has a high opioid tolerance. Opioid induced side effects including sedation , respiratory suppression, nausea, and constipation. The patient does not history of ARLEN or a history of substance use disorder.     Discussed multimodal interventions as well as other than systemic pharmacologic treatments for acute and chronic pain .    Review of medical record/Summary of labs and care everywhere as part of comprehensive review.      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     Allergies     Allergies   Allergen Reactions    Armodafinil Other (See Comments)        Current Home Medications   Prior to Admission medications    Medication Sig Start Date End Date Taking? Authorizing Provider   allopurinol (ZYLOPRIM) 300 MG tablet TAKE 1 TABLET(300 MG) BY MOUTH EVERY DAY Strength: 300 mg 2/19/23  Yes Reported, Patient   amphetamine-dextroamphetamine (ADDERALL) 20 MG tablet Take 20 mg by mouth daily   Yes Unknown, Entered By History   atorvastatin (LIPITOR) 40 MG tablet Take 40 mg by mouth daily 6/13/23  Yes Reported, Patient   B Complex CAPS Take 1 tablet by mouth daily   Yes Reported, Patient   buPROPion (WELLBUTRIN XL) 150 MG 24 hr tablet Take 300 mg by mouth every morning   Yes Reported, Patient   busPIRone (BUSPAR) 7.5 MG tablet Take 10 mg by mouth 3 times daily   Yes Unknown, Entered By History   chlorhexidine (PERIDEX) 0.12 % solution Swish and spit 15 mLs in mouth 2 times daily as needed 9/29/23  Yes Reported, Patient   dicyclomine (BENTYL) 20 MG tablet Take 20 mg by mouth 3 times daily as needed   Yes Unknown, Entered By History   DULoxetine (CYMBALTA) 30 MG capsule Take 60 mg by mouth every morning   Yes Reported, Patient   finasteride (PROSCAR) 5 MG tablet Take 1 tablet by mouth daily 10/6/23  Yes Reported, Patient   FLOMAX 0.4 MG OR CP24 1 TABLET DAILY AFTER A MEAL 6/2/06  Yes Jakob Conner MD   isosorbide mononitrate (IMDUR) 60 MG 24 hr tablet Take 90 mg by mouth daily   Yes Unknown, Entered By History   lidocaine (XYLOCAINE) 5 % external ointment APPLY TOPICALLY TO THE AFFECTED AREA THREE TIMES DAILY AS NEEDED 3/24/23  Yes Reported, Patient   losartan (COZAAR) 100 MG tablet Take 0.5 tablets (50 mg) by mouth daily 12/7/23  Yes Adan Rincon MD   medication given by  implanted intrathecal pump continuous Drug # 1: Bupivacaine (Marcaine)  - Conc:25 mg/mL - Total Dose / 24 hours: 17.52 mg    Drug # 2: Hydromorphone (Dilaudid)  - Conc:0.8 mg/mL - Total Dose / 24 hours: 0.56 mg    Drug # 3: Fentanyl (Sublimaze) - Conc: 1817.5 mcg/mL - Total Dose / 24 hours: 1273.9 mcg    Diluent: NS    May give boluses every 2 hours for up to 3 boluses per day of hydromorphone at 0.1982mg, fentanyl at 45 mcg and bupivacaine 0.619 mg in a 24 hour period   Pump Reservoir Volume: 40 mL  Outside Clinic & Provider: [facility]   Last Refill Date: 2/27/24  Next Refill Date: 4/16/2024  [place] Alarm Date: 1/20/2024  Pump : [employer]     Please consider consulting the pain team if the patient is admitted with an IT pump.   Yes Unknown, Entered By History   metoprolol succinate ER (TOPROL XL) 25 MG 24 hr tablet Take 1 tablet (25 mg) by mouth daily 12/7/23  Yes Adan Rincon MD   MULTIVITAMINS OR TABS 1 tablet daily 5/8/06  Yes Jakob Conner MD   NARCAN 4 MG/0.1ML nasal spray once as needed 7/24/23  Yes Reported, Patient   nitroGLYcerin (NITROSTAT) 0.4 MG sublingual tablet DISSOLVE 1 TABLET UNDER THE TONGUE AS NEEDED FOR CHEST PAIN EVERY 5 MINUTES AS NEEDED AS DIRECTED   Yes Reported, Patient   ondansetron (ZOFRAN ODT) 4 MG ODT tab DISSOLVE 1 TABLET(4 MG) ON THE TONGUE EVERY 8 HOURS AS NEEDED FOR NAUSEA OR VOMITING 4/24/23  Yes Reported, Patient   Potassium Chloride MONICA Take 20 mEq by mouth 2 times daily   Yes Reported, Patient   torsemide (DEMADEX) 10 MG tablet Take 20 mg by mouth daily 6/6/23  Yes Reported, Patient   traZODone (DESYREL) 50 MG tablet Take 75 mg by mouth at bedtime 5/9/23  Yes Reported, Patient          Social History  Reviewed, and he  reports that he has never smoked. He has never used smokeless tobacco. He reports that he does not drink alcohol and does not use drugs.      Family History- Reviewed care everywhere to find family history Nothing  relevant to pain consult    Reviewed, and family history includes Cardiovascular in his father and mother; Diabetes in his sister.    Review of Systems  Complete ROS reviewed, unless noted  , all other systems reviewed (with patient) and all others found to be negative.         Objective:     Vitals:  B/P: 90/47, T: 98.4, P: 61, R: 20     Weight:   173 lbs 11.2 oz  Body mass index is 31.77 kg/m .      Physical Exam:     General Appearance:  Alert, cooperative, no distress, appears stated age,pleasant, grooming fair  Patient is able to make needs know, has trouble with understanding some aspects of his treatment plan   Head:  Normocephalic, without obvious abnormality, atraumatic   Eyes:  Pupils are mid-position, normal size, sclera clear, conjunctiva clear without drainage   ENT/Throat: Lips and mouth are moist    Lymph/Neck: Supple, symmetrical, trachea midline, no adenopathy, thyroid: not enlarged, symmetric    Lungs:   Clear to auscultation bilaterally, respirations unlabored   Chest Wall:  No tenderness or deformity   Cardiovascular/Heart:  Regular rate and rhythm, S1, S2, no edema normotensive   Abdomen:   Soft, non-tender, bowel sounds active all four quadrants,  no masses, no organomegaly   Musculoskeletal: Extremities normal, atraumatic  Incision none   Skin: Skin color good, lesions  none multiple ecchymotic areas   Neurologic  Affect: Alert and oriented X 3, not situation fully Moves all 4 extremities motor 5-/5   good, abberant pain behaviors No          Imaging Reviewed Personally By Myself     Results for orders placed or performed during the hospital encounter of 02/28/24   CT Head w/o Contrast    Impression    IMPRESSION:   1. No acute intracranial pathology.   2. Moderate diffuse cerebral volume loss and mild chronic small vessel  ischemic disease.  3. Age-indeterminate left nasal fracture.    BARBIE MG MD         SYSTEM ID:  [id number]   CT Cervical Spine w/o Contrast    Impression     IMPRESSION:  1. No acute fracture or posttraumatic subluxation.  2. Multilevel cervical spondylosis as detailed above.     BARBIE MG MD         SYSTEM ID:  [id number]   CT Thoracic Spine w/o Contrast    Impression    IMPRESSION:   1. Thoracic spine:   a. No acute fracture or subluxation.  b. Multilevel spondylosis without high-grade spinal canal stenosis.  Moderate to severe right neural foraminal stenosis at T12-L1 secondary  to bone overgrowth.      2. Lumbar Spine:   a. Compression fracture of L1 with mild anterior vertebral body height  loss, new compared to 12/4/2023 and likely acute. No retropulsion of  the fractured vertebral body.  b. Multilevel lumbar spondylosis as above.    BARBIE MG MD         SYSTEM ID:  [id number]   CT Lumbar Spine w/o Contrast    Impression    IMPRESSION:   1. Thoracic spine:   a. No acute fracture or subluxation.  b. Multilevel spondylosis without high-grade spinal canal stenosis.  Moderate to severe right neural foraminal stenosis at T12-L1 secondary  to bone overgrowth.      2. Lumbar Spine:   a. Compression fracture of L1 with mild anterior vertebral body height  loss, new compared to 12/4/2023 and likely acute. No retropulsion of  the fractured vertebral body.  b. Multilevel lumbar spondylosis as above.    BARBIE MG MD         SYSTEM ID:  [id number]   CT Chest/Abdomen/Pelvis w Contrast    Impression    IMPRESSION:  1.  Increased density material involving the left shoulder joint  worrisome for acute hematoma. This is ill-defined but appears to at  least partially extend to the left shoulder joint region and may be  intramuscular involving the muscle surrounding the left shoulder.  2.  No visible acute displaced fracture identified. Please note that  the structures of the left shoulder are incompletely included for  imaging assessment on this exam.   3.  No other acute abnormality identified.  4.  Stable pulmonary nodular opacities on the right.  5.  Subcutaneous  edema at the left lateral chest wall extending to the  left upper abdomen level.       Recommendations for an incidental lung nodule = or > 6mm to 8mm:    Low risk patients: Initial follow-up CT at 6-12 months, then  consider CT at 18-24 months if no change.    High risk patients: Initial follow-up CT at 6-12 months, then CT at  18-24 months if no change.    *Low Risk: Minimal or absent history of smoking or other known risk  factors.  *Nonsolid (ground-glass) or partly solid nodules may require longer  follow-up to exclude indolent adenocarcinoma.  *Recommendations based on Guidelines for the Management of Incidental  Pulmonary Nodules Detected at CT: From the Fleischner Society 2017,  Radiology 2017.    AUDELIA STILES MD         SYSTEM ID:  [id number]   CT Facial Bones without Contrast    Impression    Impression: Again demonstrated is displaced age indeterminate nasal  fracture on the left. No new fracture.     BARBIE MG MD         SYSTEM ID:  [id number]   Humerus XR,  G/E 2 views, left    Impression    IMPRESSION: The humerus appears normal. There is no evidence of  fracture. There is an os acromiale and there are hypertrophic changes  at the pseudoarthrosis between the os acromiale and the acromion  proper. Mild osteoarthrosis of the AC joint. Otherwise negative.    TANISHA BARRERA MD         SYSTEM ID:  [id number]   Radius/Ulna XR,  PA &LAT, left    Impression    IMPRESSION: Arterial calcifications. The radius and ulna appear  normal. Positive ulnar variance. 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 There are also degenerative changes at the STT joint and  first CMC joint. There is also chondrocalcinosis in the wrist.    TANISHA BARRERA MD         SYSTEM ID:  [id number]   CT Head w/o Contrast    Impression    IMPRESSION:  1.  No CT evidence for acute intracranial process.  2.  Mild chronic microvascular ischemic changes similar to  prior.        Labs Reviewed Personally By Myself    Sodium   Date Value Ref Range Status   02/29/2024 132 (L) 135 - 145 mmol/L Final     Comment:     Reference intervals for this test were updated on 09/26/2023 to more accurately reflect our healthy population. There may be differences in the flagging of prior results with similar values performed with this method. Interpretation of those prior results can be made in the context of the updated reference intervals.    03/25/2008 139 133 - 144 mmol/L Final     Potassium   Date Value Ref Range Status   02/29/2024 3.9 3.4 - 5.3 mmol/L Final   03/25/2008 4.3 3.4 - 5.3 mmol/L Final     Chloride   Date Value Ref Range Status   02/29/2024 97 (L) 98 - 107 mmol/L Final   03/25/2008 97 94 - 109 mmol/L Final     Carbon Dioxide   Date Value Ref Range Status   03/25/2008 33 (H) 20 - 32 mmol/L Final     Carbon Dioxide (CO2)   Date Value Ref Range Status   02/29/2024 24 22 - 29 mmol/L Final     Anion Gap   Date Value Ref Range Status   02/29/2024 11 7 - 15 mmol/L Final   03/25/2008 9 6 - 17 mmol/L Final     Glucose   Date Value Ref Range Status   02/29/2024 85 70 - 99 mg/dL Final   03/23/2007 89 60 - 110 mg/dL Final     Urea Nitrogen   Date Value Ref Range Status   02/29/2024 17.2 8.0 - 23.0 mg/dL Final   03/23/2007 24 7 - 30 mg/dL Final     Creatinine   Date Value Ref Range Status   02/29/2024 0.83 0.67 - 1.17 mg/dL Final   03/23/2007 1.33 0.80 - 1.50 mg/dL Final     GFR Estimate   Date Value Ref Range Status   02/29/2024 88 >60 mL/min/1.73m2 Final   03/23/2007 58 (L) >60 mL/min/1.7m2 Final     Calcium   Date Value Ref Range Status   02/29/2024 8.1 (L) 8.8 - 10.2 mg/dL Final   03/23/2007 8.7 8.5 - 10.4 mg/dL Final     Critical medical decision making:   Based on  patient with functional and cognitive decline with use of high risk medications  High risk medication use Yes, High risk medication monitoring Yes, Relevant labs,imaging notes reviewed by me? Yes  Patient education on  pain plan? Yes  Please see A&P for additional details of medical decision making.  Medical complexity over the past 24 hours:  - Prescription DRUG MANAGEMENT performed  - stopped Oxycodone and trial of Dilaudid,   70 MINUTES SPENT BY ME on the date of service doing chart review, history, exam, documentation & further activities per the note.  I communicated with the hospitalist providing om pain plan care today? Yes left message and discussed with IT pump nurse at [facility] with recommendations? Yes   I communicated with the bed side nurse on pian plan of care?  Yes  Thank you for this consultation in the care of Eren James.      Cherise Connolly RN, PGMT-BC APRN,CNP,ACHPN   Acute Pain Team ( [facility], [facility], [facility]) 8-4:30 after 3:30 page house officer   No weekend coverage   Securely message with the Vocera Web Console (learn more here)

## 2024-03-01 NOTE — PROGRESS NOTES
"Care Management Follow Up    Length of Stay (days): 2    Expected Discharge Date: 03/03/2024     Concerns to be Addressed: care coordination/care conferences, discharge planning       Anticipated Discharge Disposition: Transitional Care  Anticipated Discharge Services: None  Anticipated Discharge DME: None    Patient/family educated on Medicare website which has current facility and service quality ratings: yes  Education Provided on the Discharge Plan: Yes  Patient/Family in Agreement with the Plan:  yes    Referrals Placed by CM/SW: Post Acute Facilities      Additional Information:  CM following for care coordination/discharge planning to TCU. CM spoke with [facility] today regarding decline reason \"fall risk.\" [facility] requested we re send referral so they can review patient for admission again, CM re sent referral.     Rosi Cabrera RN, BSN  Inpatient Care Coordination  [facility]  [phone]    "

## 2024-03-01 NOTE — PLAN OF CARE
Goal Outcome Evaluation:    Plan of Care Reviewed With: patient     Overall Patient Progress: improving    Patient is A/O x3, confused at times. VSS, on 1 L oxygen. soft BP MD updated. Pain managed with scheduled Tylenol, PRN IV dilaudid, po dilaudid, and Voltaren gel. On continues implanted intrathecal pump. Tolerating a regular diet. Assist x1 with walker and gait belt. Voiding ok. Ecchymosis in the left shoulder, lower back, upper and lower extremities. Facial abrasion. Wound care completed per order. WOC, Ortho, Neurosurgery, SW, PT/OT following. Discharge plan TCU, referral sent.

## 2024-03-01 NOTE — PROCEDURES
"    [facility] ACUTE PAIN SERVICE CONSULTATION   [facility]   Text Page Pain Via [facility]      Procedure Note - Intrathecal Pump Interrogation           Procedure:    Pump Check         Pre-Operative Diagnosis: chronic pain & presence of intrathecal pump         Post-Operative Diagnosis: Same         Indication: chronic pain and spasticity         Procedure Details: The intended procedure, risks, and alternatives were discussed with the patient and informed consent was obtained. \"Pause for the cause\" was utilized to identify correct patient and intended procedure. The pump was interrogated.         Findings: His pump is filled with  Fentanyl with a concentration of 1817.5 mcg/ML delivering  1273.9 mcg/day  Bupivacaine concentration is 25.0 mg/mL and delivering 17.522 mg/day.  Dilaudid concentration is 0.8 mg/ML and is delivering 0.18107 mg/day    Bolus (PTM) Fentanyl 45.0 mcg, Bupivacaine 0.619 mg, Hydromorphone (dilaudid) 0.1982 mg 2 hour lockout, over 2 minutes 3 per day. Last PMT activation 2/27/24 15:08 PM   Cath tip at T7,entry L1-2  He is not due to have an alarm run off until 4/16/23.         Pain Clinic notified: yes    Cherise Connolly RN, PGMT-BC APRN,CNP,ACHPN   Acute Pain Team ( [facility]) 8-4:30 after 3:30 page house officer   No weekend coverage   [facility] Paging/Directory Pain  Securely message with the Vocera Web Console (learn more here)      "

## 2024-03-01 NOTE — PLAN OF CARE
Goal Outcome Evaluation:      Plan of Care Reviewed With: patient      Pt is alert and intermittently confused. On 1L of O2 as he desaturates at night. Bp has been trending low. Back and left hand pain. Dilaudid given x2. Voltaren gel and lidocaine patch applied. Regular diet. Scattered bruises. Facial bruises. Ecchymosis in the left shoulder and lower back.  Assist x1 with g/w. WOC following. PT/OT following.

## 2024-03-02 ENCOUNTER — APPOINTMENT (OUTPATIENT)
Dept: PHYSICAL THERAPY | Facility: CLINIC | Age: 79
DRG: 543 | End: 2024-03-02
Payer: MEDICARE

## 2024-03-02 ENCOUNTER — APPOINTMENT (OUTPATIENT)
Dept: OCCUPATIONAL THERAPY | Facility: CLINIC | Age: 79
DRG: 543 | End: 2024-03-02
Payer: MEDICARE

## 2024-03-02 LAB
ANION GAP SERPL CALCULATED.3IONS-SCNC: 10 MMOL/L (ref 7–15)
BUN SERPL-MCNC: 15.4 MG/DL (ref 8–23)
CALCIUM SERPL-MCNC: 8.5 MG/DL (ref 8.8–10.2)
CHLORIDE SERPL-SCNC: 103 MMOL/L (ref 98–107)
CREAT SERPL-MCNC: 0.6 MG/DL (ref 0.67–1.17)
DEPRECATED HCO3 PLAS-SCNC: 23 MMOL/L (ref 22–29)
EGFRCR SERPLBLD CKD-EPI 2021: >90 ML/MIN/1.73M2
ERYTHROCYTE [DISTWIDTH] IN BLOOD BY AUTOMATED COUNT: 12.9 % (ref 10–15)
GLUCOSE SERPL-MCNC: 115 MG/DL (ref 70–99)
HCT VFR BLD AUTO: 28.3 % (ref 40–53)
HGB BLD-MCNC: 9 G/DL (ref 13.3–17.7)
MCH RBC QN AUTO: 30.4 PG (ref 26.5–33)
MCHC RBC AUTO-ENTMCNC: 31.8 G/DL (ref 31.5–36.5)
MCV RBC AUTO: 96 FL (ref 78–100)
PLATELET # BLD AUTO: 422 10E3/UL (ref 150–450)
POTASSIUM SERPL-SCNC: 5 MMOL/L (ref 3.4–5.3)
RBC # BLD AUTO: 2.96 10E6/UL (ref 4.4–5.9)
SODIUM SERPL-SCNC: 136 MMOL/L (ref 135–145)
WBC # BLD AUTO: 6.8 10E3/UL (ref 4–11)

## 2024-03-02 PROCEDURE — 36415 COLL VENOUS BLD VENIPUNCTURE: CPT | Performed by: INTERNAL MEDICINE

## 2024-03-02 PROCEDURE — 80048 BASIC METABOLIC PNL TOTAL CA: CPT | Performed by: INTERNAL MEDICINE

## 2024-03-02 PROCEDURE — 97530 THERAPEUTIC ACTIVITIES: CPT | Mod: GO

## 2024-03-02 PROCEDURE — 85014 HEMATOCRIT: CPT | Performed by: INTERNAL MEDICINE

## 2024-03-02 PROCEDURE — 97530 THERAPEUTIC ACTIVITIES: CPT | Mod: GP | Performed by: PHYSICAL THERAPIST

## 2024-03-02 PROCEDURE — 250N000013 HC RX MED GY IP 250 OP 250 PS 637: Performed by: NURSE PRACTITIONER

## 2024-03-02 PROCEDURE — 99232 SBSQ HOSP IP/OBS MODERATE 35: CPT | Performed by: INTERNAL MEDICINE

## 2024-03-02 PROCEDURE — 250N000013 HC RX MED GY IP 250 OP 250 PS 637: Performed by: INTERNAL MEDICINE

## 2024-03-02 PROCEDURE — 120N000001 HC R&B MED SURG/OB

## 2024-03-02 RX ADMIN — TAMSULOSIN HYDROCHLORIDE 0.4 MG: 0.4 CAPSULE ORAL at 08:16

## 2024-03-02 RX ADMIN — POTASSIUM CHLORIDE 20 MEQ: 750 TABLET, FILM COATED, EXTENDED RELEASE ORAL at 08:16

## 2024-03-02 RX ADMIN — ISOSORBIDE MONONITRATE 90 MG: 30 TABLET, EXTENDED RELEASE ORAL at 08:16

## 2024-03-02 RX ADMIN — ACETAMINOPHEN 975 MG: 325 TABLET, FILM COATED ORAL at 14:04

## 2024-03-02 RX ADMIN — ALLOPURINOL 300 MG: 300 TABLET ORAL at 08:16

## 2024-03-02 RX ADMIN — DULOXETINE HYDROCHLORIDE 60 MG: 60 CAPSULE, DELAYED RELEASE ORAL at 08:16

## 2024-03-02 RX ADMIN — FINASTERIDE 5 MG: 5 TABLET, FILM COATED ORAL at 08:16

## 2024-03-02 RX ADMIN — Medication 4 G: at 08:17

## 2024-03-02 RX ADMIN — BUSPIRONE HYDROCHLORIDE 10 MG: 10 TABLET ORAL at 20:07

## 2024-03-02 RX ADMIN — LIDOCAINE 1 PATCH: 4 PATCH TOPICAL at 20:09

## 2024-03-02 RX ADMIN — ATORVASTATIN CALCIUM 40 MG: 40 TABLET, FILM COATED ORAL at 23:16

## 2024-03-02 RX ADMIN — HYDROMORPHONE HYDROCHLORIDE 2 MG: 2 TABLET ORAL at 19:12

## 2024-03-02 RX ADMIN — Medication 4 G: at 20:08

## 2024-03-02 RX ADMIN — Medication 4 G: at 16:20

## 2024-03-02 RX ADMIN — Medication 4 G: at 12:55

## 2024-03-02 RX ADMIN — BUSPIRONE HYDROCHLORIDE 10 MG: 10 TABLET ORAL at 08:16

## 2024-03-02 RX ADMIN — POTASSIUM CHLORIDE 20 MEQ: 750 TABLET, FILM COATED, EXTENDED RELEASE ORAL at 20:07

## 2024-03-02 RX ADMIN — METOPROLOL SUCCINATE 25 MG: 25 TABLET, EXTENDED RELEASE ORAL at 08:16

## 2024-03-02 RX ADMIN — TRAZODONE HYDROCHLORIDE 75 MG: 50 TABLET ORAL at 23:16

## 2024-03-02 RX ADMIN — HYDROMORPHONE HYDROCHLORIDE 2 MG: 2 TABLET ORAL at 14:04

## 2024-03-02 RX ADMIN — BUSPIRONE HYDROCHLORIDE 10 MG: 10 TABLET ORAL at 14:04

## 2024-03-02 RX ADMIN — BUPROPION 300 MG: 150 TABLET, EXTENDED RELEASE ORAL at 08:17

## 2024-03-02 RX ADMIN — ACETAMINOPHEN 975 MG: 325 TABLET, FILM COATED ORAL at 08:17

## 2024-03-02 RX ADMIN — ACETAMINOPHEN 975 MG: 325 TABLET, FILM COATED ORAL at 20:07

## 2024-03-02 ASSESSMENT — ACTIVITIES OF DAILY LIVING (ADL)
ADLS_ACUITY_SCORE: 32
ADLS_ACUITY_SCORE: 32
ADLS_ACUITY_SCORE: 31
ADLS_ACUITY_SCORE: 32
ADLS_ACUITY_SCORE: 31
ADLS_ACUITY_SCORE: 32
ADLS_ACUITY_SCORE: 34
ADLS_ACUITY_SCORE: 32
ADLS_ACUITY_SCORE: 34
ADLS_ACUITY_SCORE: 32
ADLS_ACUITY_SCORE: 34
ADLS_ACUITY_SCORE: 31
ADLS_ACUITY_SCORE: 31
ADLS_ACUITY_SCORE: 32
ADLS_ACUITY_SCORE: 32
ADLS_ACUITY_SCORE: 31

## 2024-03-02 NOTE — PLAN OF CARE
Goal Outcome Evaluation:                  Pt is A&OX3, forgetful, intermittent confusion. Pt was impulsive at evening time.was talking loudly, was repeating questions. He was upset that someone ordered  food for him. Writer offered to reorder  other food, he refused.Later , when wife came pt was calm and ate all his food.he become more   cooperative.on RA. LS CTA, denies SOB. No c/o N/V. IV SL. Pt has scattered bruises to UE and LE. Abrasions to LUE and RLE.  Dressing CDI. Rated his pain 6/10. Pt is A-1 with walker and gate belt. Ambulated to bathroom.

## 2024-03-02 NOTE — PROGRESS NOTES
Care Management Follow Up    Length of Stay (days): 3    Expected Discharge Date: 03/03/2024     Concerns to be Addressed: care coordination/care conferences, discharge planning       Anticipated Discharge Disposition: Transitional Care     Anticipated Discharge Services: None  Anticipated Discharge DME: None    Patient/family educated on Medicare website which has current facility and service quality ratings: yes  Education Provided on the Discharge Plan: Yes  Patient/Family in Agreement with the Plan:      Referrals Placed by CM/SW: Post Acute Facilities      Additional Information:  [facility] and [facility] declined.  SW sent referral to [facility]. May be difficult to place over the weekend as most TCUs are closed for weekend admissions.     Addendum 1005: [facility] is reviewing. They do not have male beds today but will let SW know when and if they can accept.     Addendum 1025: [facility] will keep on list but has no beds. LVM for wife requesting more choices.     Addendum 1030: SW spoke with wife. She agrees to referrals to [facility] and [facility]. Referrals sent     CARLIE Gomez, OLMAN  Emergency Room   Please contact the SW on the floor in which the patient is staying for any questions or concerns

## 2024-03-02 NOTE — PROGRESS NOTES
Care Management Follow Up    Length of Stay (days): 3    Expected Discharge Date: 03/03/2024     Concerns to be Addressed: care coordination/care conferences, discharge planning       Anticipated Discharge Disposition: Transitional Care     Anticipated Discharge Services: None  Anticipated Discharge DME: None    Patient/family educated on Medicare website which has current facility and service quality ratings: yes  Education Provided on the Discharge Plan: Yes  Patient/Family in Agreement with the Plan:      Referrals Placed by CM/SW: Post Acute Facilities    Additional Information:  Followed up with pending TCU referrals. No accepting facility at this time.     CARLIE Gomez, LGSW  Emergency Room   Please contact the SW on the floor in which the patient is staying for any questions or concerns

## 2024-03-02 NOTE — PROGRESS NOTES
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  Tg feels that she cannot take care of him at home with him not compliant with using a cane or a walker with some memory issues.  CPK was 645, glucose 128, hemoglobin 10.2, INR 1.8 urinalysis was negative for infection, CT of the head did not show any acute intracranial pathology except moderate diffuse cerebral volume loss with mild chronic small vessel ischemic disease.  Thoracic spine did not show any acute fractures or subluxation but multiple level spondylolysis with high-grade spinal stenosis moderate to severe right neuroforaminal stenosis at T12 and L1.  Lumbar spine CT scan shows compression fracture of L1 with mild anterior vertebral body height loss.  CT scan of the chest showed increased density material involving left shoulder joint worrisome for acute hematoma with appearance of extension into the left shoulder joint region. Subcutaneous edema at the left lateral chest wall extending to the left upper abdomen level.  Cervical spine shows spondylolysis.  Patient's blood pressure was lower       Date of Admission:  2/28/2024  Assessment & Plan     Recurrent Falls  L Shoulder Hematoma  L1 Compression Fracture  Multiple Ecchymoses on the Body  - Appreciate Neurosurgery recommendations  - Appreciate Orthopedic Surgery  recommendations  - Pain control  - Appreciate Pharmacy assistance with resumption of pain pump  - PT/OT - pt will need TCU.  Pt's wife is agreeable to TCU if recommended by PT or OT.   trying to arrange for TCU but     Recurrent DVTs  - Discussed with pt and wife that given his hematoma we are holding his eliquis.  Also discussed that given his recurrent falls the risk of bleeding and high fall risk outweighs the benefit of eliquis.  It was recommended that eliquis be discontinued going forward.  His most recent LE venous doppler on 2/9/2024 showed no evidence of DVT.  If DVT recurs could consider IVC filter, which was discussed with the patient and his wife.  Both were agreeable.     Polypharmacy  Memory Loss  Concern for Mild Cognitive Impairment vs Dementia  - Discussed with wife Tg via phone.  Pt's wife and his friends who he plays cards with for many years have noticed some memory issues over the last 6 months.  We discussed that the patient would benefit from a referral to a Neuropsychiatrist for formal evaluation for dementia.  Pt's wife was agreeable.  Referral placed.     Social Issues  - Pt currently lives at home with his wife.  They have some stairs in the home.  She reports with his recurrent falls he requires too much care and she is unable to provide it.  She has been encouraging him to use a cane or walker lately, but he has been noncompliant.  She expressed that she has been looking in to other living arrangements.  We discussed that he would benefit from ILF or JEFF given his high needs.  She expressed understanding     Chronic Pain with Opiate Dependence  Polypharmacy  - Would benefit from reduction of his pain meds and simplification of his medication regimen  Med rec complete showing adderall.  Given his tachycardia and anxiety, will hold adderall in AM.     Chronic Medical Problems:  COPD  HFpEF  Coronary Artery Disease  Hypertension  Hyperlipidemia  BPH            Plan:    Awaiting TCU placement    Diet: Combination Diet Regular Diet Adult    DVT Prophylaxis: Pneumatic Compression Devices  Alexander Catheter: Not present  Code Status: Full Code               The patient's care was discussed with the Patient and wife    Rocael Boyer MD  Hospitalist [facility]    ______________________________________________________________________    Interval History   Patient denies any pain, shortness of breath.  He is afebrile.  Has pain in the left shoulder but states it is better now.  When asking him about the discharge plan, he was not quite sure where he will eventually be discharging to.  I discussed about OT and PT recommendation.  Patient is agreeable to discharging to transitional care unit      Review of Systems:   He denies any pain, chest pain or headache    Data reviewed today: I reviewed all medications, new labs and imaging results over the last 24 hours.     Physical Exam   Vital Signs: Temp: 97.4  F (36.3  C) Temp src: Temporal BP: 116/46 Pulse: 62   Resp: 20 SpO2: 96 % O2 Device: None (Room air)    Weight: 173 lbs 11.2 oz      GENERAL: Patient is not in acute distress  HEENT: EOM+,Conjunctiva is clear   NECK:  no Jugular Venous distention  HEART: S1 S2 regular Rate and Rhythm,     LUNGS: Respirations are  not laboured, Lungs are  clear to auscultation without Crepitations or Wheezing   ABDOMEN: Soft , there is no tenderness ,Bowel Sounds are  Positive   LOWER LIMBS: no  Pedal Edema  Bilaterally   CNS:  Alert,  Oriented x 3, Moving all the Four Limbs     Data   Recent Labs   Lab 03/02/24  0928 03/02/24  0607 02/29/24  1744 02/29/24  0614 02/28/24  1214   WBC  --  6.8  --  7.4 9.0   HGB  --  9.0* 8.3* 8.5* 10.2*   MCV  --  96  --  94 90   PLT  --  422  --  337 384   INR  --   --   --   --  1.87*     --   --  132* 135   POTASSIUM 5.0  --   --  3.9 4.5   CHLORIDE 103  --   --  97* 94*   CO2 23  --   --  24 25   BUN 15.4  --   --  17.2 21.6   CR  0.60*  --   --  0.83 1.11   ANIONGAP 10  --   --  11 16*   SHAUN 8.5*  --   --  8.1* 9.0   *  --   --  85 138*   ALBUMIN  --   --   --   --  3.6   PROTTOTAL  --   --   --   --  6.6   BILITOTAL  --   --   --   --  0.7   ALKPHOS  --   --   --   --  138   ALT  --   --   --   --  26   AST  --   --   --   --  42     Discussed with the patient's RN

## 2024-03-02 NOTE — PROGRESS NOTES
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  Tg feels that she cannot take care of him at home with him not compliant with using a cane or a walker with some memory issues.  CPK was 645, glucose 128, hemoglobin 10.2, INR 1.8 urinalysis was negative for infection, CT of the head did not show any acute intracranial pathology except moderate diffuse cerebral volume loss with mild chronic small vessel ischemic disease.  Thoracic spine did not show any acute fractures or subluxation but multiple level spondylolysis with high-grade spinal stenosis moderate to severe right neuroforaminal stenosis at T12 and L1.  Lumbar spine CT scan shows compression fracture of L1 with mild anterior vertebral body height loss.  CT scan of the chest showed increased density material involving left shoulder joint worrisome for acute hematoma with appearance of extension into the left shoulder joint region. Subcutaneous edema at the left lateral chest wall extending to the left upper abdomen level.  Cervical spine shows spondylolysis.  Patient's blood pressure was lower       Date of Admission:  2/28/2024  Assessment & Plan     Recurrent Falls  L Shoulder Hematoma  L1 Compression Fracture  Multiple Ecchymoses on the Body  - Appreciate Neurosurgery recommendations  - Appreciate Orthopedic Surgery  recommendations  - Pain control  - Appreciate Pharmacy assistance with resumption of pain pump  - PT/OT - pt will need TCU.  Pt's wife is agreeable to TCU if recommended by PT or OT.   trying to arrange for TCU but     Recurrent DVTs  - Discussed with pt and wife that given his hematoma we are holding his eliquis.  Also discussed that given his recurrent falls the risk of bleeding and high fall risk outweighs the benefit of eliquis.  It was recommended that eliquis be discontinued going forward.  His most recent LE venous doppler on 2/9/2024 showed no evidence of DVT.  If DVT recurs could consider IVC filter, which was discussed with the patient and his wife.  Both were agreeable.     Polypharmacy  Memory Loss  Concern for Mild Cognitive Impairment vs Dementia  - Discussed with wife Tg via phone.  Pt's wife and his friends who he plays cards with for many years have noticed some memory issues over the last 6 months.  We discussed that the patient would benefit from a referral to a Neuropsychiatrist for formal evaluation for dementia.  Pt's wife was agreeable.  Referral placed.     Social Issues  - Pt currently lives at home with his wife.  They have some stairs in the home.  She reports with his recurrent falls he requires too much care and she is unable to provide it.  She has been encouraging him to use a cane or walker lately, but he has been noncompliant.  She expressed that she has been looking in to other living arrangements.  We discussed that he would benefit from ILF or JEFF given his high needs.  She expressed understanding     Chronic Pain with Opiate Dependence  Polypharmacy  - Would benefit from reduction of his pain meds and simplification of his medication regimen  Med rec complete showing adderall.  Given his tachycardia and anxiety, will hold adderall in AM.     Chronic Medical Problems:  COPD  HFpEF  Coronary Artery Disease  Hypertension  Hyperlipidemia  BPH            Plan:    Awaiting TCU placement    Diet: Combination Diet Regular Diet Adult    DVT Prophylaxis: Pneumatic Compression Devices  Alexander Catheter: Not present  Code Status: Full Code               The patient's care was discussed with the Patient and wife    Aguila Rincon MD  Hospitalist Service  [facility]    ______________________________________________________________________    Interval History     Symptoms   Patient denies falling down today    Review of Systems:   He denies any pain, chest pain or headache    Data reviewed today: I reviewed all medications, new labs and imaging results over the last 24 hours.     Physical Exam   Vital Signs: Temp: 97.8  F (36.6  C) Temp src: Temporal BP: (!) 143/45 Pulse: 78   Resp: 20 SpO2: 96 % O2 Device: None (Room air) Oxygen Delivery: 1 LPM  Weight: 173 lbs 11.2 oz      GENERAL: Patient is not in acute distress  HEENT: EOM+,Conjunctiva is clear   NECK:  no Jugular Venous distention  HEART: S1 S2 regular Rate and Rhythm,     LUNGS: Respirations are  not laboured, Lungs are  clear to auscultation without Crepitations or Wheezing   ABDOMEN: Soft , there is no tenderness ,Bowel Sounds are  Positive   LOWER LIMBS: no  Pedal Edema  Bilaterally   CNS:  Alert,  Oriented x 3, Moving all the Four Limbs     Data   Recent Labs   Lab 02/29/24  1744 02/29/24  0614 02/28/24  1214   WBC  --  7.4 9.0   HGB 8.3* 8.5* 10.2*   MCV  --  94 90   PLT  --  337 384   INR  --   --  1.87*   NA  --  132* 135   POTASSIUM  --  3.9 4.5   CHLORIDE  --  97* 94*   CO2  --  24 25   BUN  --  17.2 21.6   CR  --  0.83 1.11   ANIONGAP  --  11 16*   SHAUN  --  8.1* 9.0   GLC  --  85 138*   ALBUMIN  --   --  3.6   PROTTOTAL  --   --  6.6   BILITOTAL  --   --  0.7   ALKPHOS  --   --  138   ALT  --   --  26   AST  --   --  42         Recent Results (from the past 24 hour(s))   XR Lumbar Spine 2/3 Views    Narrative    LUMBAR SPINE TWO TO THREE VIEWS  3/1/2024 12:10 PM     COMPARISON: Lumbar spine CT  2/28/2024.    HISTORY: L1 fracture status post fall-upright.      Impression    IMPRESSION: Posterior spine fusion devices at the L2-L3 and L3-L4  levels again noted. Moderate right convex curvature of the lumbar  spine centered at L2 again noted. Alignment otherwise normal.  Vertebral body heights normal. No definite fractures. The visualized  bones are osteopenic.    DARIEL ORTEGA MD         SYSTEM ID:  [id number]

## 2024-03-02 NOTE — PLAN OF CARE
Pt is alert with intermittent confusion. Assist of 1 with transfers. Pt reported pain 8/10 to L shoulder, back. Requested IV Dilaudid, given x1. Pt slept most of the night. Pt walked with assist of 1 to bathroom x1, voided x1. SL. Took meds whole with water. Swelling to LUE, extremity elevated on pillow. Dressings to abrasions intact. Plan to discharge to TCU, pending placement.

## 2024-03-02 NOTE — PLAN OF CARE
Goal Outcome Evaluation:    Patient is A/O with intermittent confusion. No sob.chest pain. Assist of 1 using GB/W. PRN Dilaudid, Tylenol for pain. Pt voided. IV SL. Tolerated diet. Swelling to LUE, extremity elevated on pillow. scattered bruises to bilateral extremities. Abrasions to LUE and RLE, Dressings changed per order. Discharge Plan to TCU, pending placement.

## 2024-03-03 ENCOUNTER — APPOINTMENT (OUTPATIENT)
Dept: PHYSICAL THERAPY | Facility: CLINIC | Age: 79
DRG: 543 | End: 2024-03-03
Payer: MEDICARE

## 2024-03-03 VITALS
HEART RATE: 66 BPM | SYSTOLIC BLOOD PRESSURE: 109 MMHG | BODY MASS INDEX: 31.96 KG/M2 | DIASTOLIC BLOOD PRESSURE: 67 MMHG | WEIGHT: 173.7 LBS | OXYGEN SATURATION: 95 % | HEIGHT: 62 IN | RESPIRATION RATE: 17 BRPM | TEMPERATURE: 98.2 F

## 2024-03-03 PROCEDURE — 250N000011 HC RX IP 250 OP 636: Performed by: NURSE PRACTITIONER

## 2024-03-03 PROCEDURE — 250N000013 HC RX MED GY IP 250 OP 250 PS 637: Performed by: INTERNAL MEDICINE

## 2024-03-03 PROCEDURE — 97530 THERAPEUTIC ACTIVITIES: CPT | Mod: GP | Performed by: PHYSICAL THERAPIST

## 2024-03-03 PROCEDURE — 99239 HOSP IP/OBS DSCHRG MGMT >30: CPT | Performed by: HOSPITALIST

## 2024-03-03 RX ORDER — HYDROMORPHONE HYDROCHLORIDE 2 MG/1
4 TABLET ORAL EVERY 4 HOURS PRN
Status: SHIPPED | DISCHARGE
Start: 2024-03-03 | End: 2024-03-03

## 2024-03-03 RX ORDER — HYDROMORPHONE HYDROCHLORIDE 4 MG/1
2 TABLET ORAL EVERY 4 HOURS PRN
Qty: 3 TABLET | Refills: 0 | Status: SHIPPED | OUTPATIENT
Start: 2024-03-03 | End: 2024-03-08

## 2024-03-03 RX ORDER — HYDROMORPHONE HYDROCHLORIDE 2 MG/1
2 TABLET ORAL EVERY 4 HOURS PRN
Qty: 8 TABLET | Refills: 0 | Status: SHIPPED | OUTPATIENT
Start: 2024-03-03 | End: 2024-03-03

## 2024-03-03 RX ORDER — ACETAMINOPHEN 325 MG/1
975 TABLET ORAL EVERY 8 HOURS PRN
DISCHARGE
Start: 2024-03-03

## 2024-03-03 RX ORDER — AMOXICILLIN 250 MG
1 CAPSULE ORAL 2 TIMES DAILY PRN
DISCHARGE
Start: 2024-03-03

## 2024-03-03 RX ADMIN — ACETAMINOPHEN 975 MG: 325 TABLET, FILM COATED ORAL at 08:23

## 2024-03-03 RX ADMIN — METOPROLOL SUCCINATE 25 MG: 25 TABLET, EXTENDED RELEASE ORAL at 08:22

## 2024-03-03 RX ADMIN — DULOXETINE HYDROCHLORIDE 60 MG: 60 CAPSULE, DELAYED RELEASE ORAL at 08:22

## 2024-03-03 RX ADMIN — ALLOPURINOL 300 MG: 300 TABLET ORAL at 08:22

## 2024-03-03 RX ADMIN — BUSPIRONE HYDROCHLORIDE 10 MG: 10 TABLET ORAL at 08:22

## 2024-03-03 RX ADMIN — BUSPIRONE HYDROCHLORIDE 10 MG: 10 TABLET ORAL at 13:46

## 2024-03-03 RX ADMIN — HYDROMORPHONE HYDROCHLORIDE 0.5 MG: 1 INJECTION, SOLUTION INTRAMUSCULAR; INTRAVENOUS; SUBCUTANEOUS at 05:00

## 2024-03-03 RX ADMIN — FINASTERIDE 5 MG: 5 TABLET, FILM COATED ORAL at 08:22

## 2024-03-03 RX ADMIN — ISOSORBIDE MONONITRATE 90 MG: 30 TABLET, EXTENDED RELEASE ORAL at 08:22

## 2024-03-03 RX ADMIN — TAMSULOSIN HYDROCHLORIDE 0.4 MG: 0.4 CAPSULE ORAL at 08:22

## 2024-03-03 RX ADMIN — BUPROPION 300 MG: 150 TABLET, EXTENDED RELEASE ORAL at 08:22

## 2024-03-03 RX ADMIN — Medication 4 G: at 08:25

## 2024-03-03 RX ADMIN — ACETAMINOPHEN 975 MG: 325 TABLET, FILM COATED ORAL at 13:46

## 2024-03-03 RX ADMIN — POTASSIUM CHLORIDE 20 MEQ: 750 TABLET, FILM COATED, EXTENDED RELEASE ORAL at 08:22

## 2024-03-03 RX ADMIN — Medication 4 G: at 12:14

## 2024-03-03 ASSESSMENT — ACTIVITIES OF DAILY LIVING (ADL)
ADLS_ACUITY_SCORE: 32
ADLS_ACUITY_SCORE: 34
ADLS_ACUITY_SCORE: 34
ADLS_ACUITY_SCORE: 32
ADLS_ACUITY_SCORE: 34
ADLS_ACUITY_SCORE: 31
ADLS_ACUITY_SCORE: 34
ADLS_ACUITY_SCORE: 31
ADLS_ACUITY_SCORE: 31
ADLS_ACUITY_SCORE: 32
ADLS_ACUITY_SCORE: 31
ADLS_ACUITY_SCORE: 32
ADLS_ACUITY_SCORE: 34
ADLS_ACUITY_SCORE: 34

## 2024-03-03 NOTE — PLAN OF CARE
Plan of Care Reviewed With: patient         Goal Outcome Evaluation: Pt is alert and oriented x4. Has intermittent confusion. VSS, on RA. On continuous pulse oximetry. Ambulated to bathroom with 1 assist, GB, walker. Had a small soft BM. Voiding well. Reports pain in lower back and left shoulder this shift. Pain controlled with PRN dilaudid and scheduled Tylenol and Voltaren jel. Dressing to Bilateral shin C/D/I.

## 2024-03-03 NOTE — DISCHARGE SUMMARY
[facility]  Hospitalist Discharge Summary      Date of Admission:  2/28/2024  Date of Discharge:  3/3/2024  Discharging Provider: Carlos Wood DO  Discharge Service: Hospitalist Service    Discharge Diagnoses   Recurrent falls w/L shoulder hematoma, L1 compression fracture  Recurrent DVT's  Polypharmacy  Possible cognitive impairment vs. Dementia  Chronic pain with opiate dependence  obesity  Hx COPD, HFpEF, CAD, HTN, DLD, BPH      Follow-ups Needed After Discharge   Follow-up Appointments     Follow Up and recommended labs and tests      Please call as soon as possible to make an appointment to be seen in Dr. Ayan Mendes's clinic in 2-3 weeks or as needed for the left shoulder if   doing well.     Dr. Mendes's care coordinator is Augusta. Please contact her at   [phone] to schedule an appointment. Dr. Mendes's care team's fax   number is [phone].     Dr. Mendes sees patients at 3 clinic locations:  [facility]  [address] [place] [address]  [facility]  [address] [place] [address] [place] [address]  [facility]  [address], [place] [address]      Please call the on-call phone number [phone] during evenings, nights   and weekends for any urgent needs. Prescription refills must be done   during business hours by contacting your surgical team.        Follow Up and recommended labs and tests      Follow up with Nursing home physician.  No follow up labs or test are   needed.        Follow-up and recommended labs and tests       You will have appointment in clinic scheduled for 3 weeks to evaluate   healing of L1 compression fracture. You will get X rays before   appointment.         Discharge Disposition   Discharge to TCU  Condition at discharge: Stable    Hospital Course    Eren James is a 78 year old gentleman with recurrent falls, chronic pain on opioids with opioid  dependence and pain pump, polypharmacy, recurrent DVT on Eliquis, hypertension, hypercholesteremia, COPD, coronary artery disease, BPH, heart failure with preserved LV function with most recent LVEF of 60% on 2/5/2024 came to [facility] 2/28/2024 with recurrent falls, left shoulder hematoma, L1 compression fracture and multiple ecchymosis on the body accompanied history was given by his wife Tg via the phone who said that the patient has been falling on daily basis and multiple times sometimes.  Tg feels that she cannot take care of him at home with him not compliant with using a cane or a walker with some memory issues.  CPK was 645, glucose 128, hemoglobin 10.2, INR 1.8 urinalysis was negative for infection, CT of the head did not show any acute intracranial pathology except moderate diffuse cerebral volume loss with mild chronic small vessel ischemic disease.  Thoracic spine did not show any acute fractures or subluxation but multiple level spondylolysis with high-grade spinal stenosis moderate to severe right neuroforaminal stenosis at T12 and L1.  Lumbar spine CT scan shows compression fracture of L1 with mild anterior vertebral body height loss.  CT scan of the chest showed increased density material involving left shoulder joint worrisome for acute hematoma with appearance of extension into the left shoulder joint region. Subcutaneous edema at the left lateral chest wall extending to the left upper abdomen level.  Cervical spine shows spondylolysis.  Patient's blood pressure was lower         Recurrent Falls  L Shoulder Hematoma  L1 Compression Fracture  Multiple Ecchymoses on the Body  - Appreciate Neurosurgery recommendations  - Appreciate Orthopedic Surgery recommendations  - Pain control  - Appreciate Pharmacy assistance with resumption of pain pump  - PT/OT - discharge to TCU today     Recurrent DVTs  - Discussed with pt and wife that given his hematoma we are holding his eliquis.   Also discussed that given his recurrent falls the risk of bleeding and high fall risk outweighs the benefit of eliquis.  It was recommended that eliquis be discontinued going forward.  His most recent LE venous doppler on 2/9/2024 showed no evidence of DVT.  If DVT recurs could consider IVC filter, which was discussed with the patient and his wife.  Both were agreeable.     Polypharmacy  Memory Loss  Concern for Mild Cognitive Impairment vs Dementia  - Discussed with wife Tg via phone.  Pt's wife and his friends who he plays cards with for many years have noticed some memory issues over the last 6 months.  We discussed that the patient would benefit from a referral to a Neuropsychiatrist for formal evaluation for dementia.  Pt's wife was agreeable.  Referral placed.     Social Issues  - Pt currently lives at home with his wife.  They have some stairs in the home.  She reports with his recurrent falls he requires too much care and she is unable to provide it.  She has been encouraging him to use a cane or walker lately, but he has been noncompliant.  She expressed that she has been looking in to other living arrangements.   -discharge to TCU today     Chronic Pain with Opiate Dependence  Polypharmacy  - Would benefit from reduction of his pain meds and simplification of his medication regimen  -Med rec complete showing adderall  -due to tachycardia and anxiety, will hold adderall on discharge     HX HTN  Relative hypotension  -BP has been soft while here, hold torsmide and losartan on discharge. PCP to resume as appropriate    Hx COPD, HFpEF, CAD, Hypertension, Hyperlipidemia, BPH  -cont home meds    Consultations This Hospital Stay   NEUROSURGERY IP CONSULT  ORTHOPEDIC SURGERY IP CONSULT  PHYSICAL THERAPY ADULT IP CONSULT  OCCUPATIONAL THERAPY ADULT IP CONSULT  CARE MANAGEMENT / SOCIAL WORK IP CONSULT  PAIN MANAGEMENT ADULT IP CONSULT  PHARMACY IP CONSULT  WOUND OSTOMY CONTINENCE NURSE  IP CONSULT  WOUND OSTOMY  CONTINENCE NURSE  IP CONSULT  PHYSICAL THERAPY ADULT IP CONSULT  OCCUPATIONAL THERAPY ADULT IP CONSULT    Code Status   Full Code    Time Spent on this Encounter   I, Carlos Wood DO, personally saw the patient today and spent greater than 30 minutes discharging this patient.       Carlos Wood DO  [facility]  [address]  [facility] [address]  Phone: [phone]  Fax: [phone]  ______________________________________________________________________    Physical Exam   Vital Signs: Temp: 98  F (36.7  C) Temp src: Temporal BP: 117/66 Pulse: 63   Resp: 15 SpO2: 94 % O2 Device: None (Room air)    Weight: 173 lbs 11.2 oz  Face to face completed day of discharge       Primary Care Physician   Jakob Conner MD    Discharge Orders      X-ray lumbar spine 2-3 views*     Adult Neuropsychology  Referral      Follow-up and recommended labs and tests     You will have appointment in clinic scheduled for 3 weeks to evaluate healing of L1 compression fracture. You will get X rays before appointment.     Activity    Please limit your lifting to no more that ten pounds and avoid excessive bending, twisting and turning at the lumbar spine. You should also avoid excessive jostling and jarring activities.     Ok to walk as tolerated, avoid bedrest.    No high impact activities such as; running/jogging, snowmobile or 4 santillan riding or any other recreational vehicles.    Call my office at [phone] for any other questions and concerns.    Go to ER with any seizure activity, mental status change (increasing confusion), difficulty with speech or increasing or acute weakness     Follow Up and recommended labs and tests    Please call as soon as possible to make an appointment to be seen in Dr. Ayan Mendes's clinic in 2-3 weeks or as needed for the left shoulder if doing well.     Dr. Mendes's care coordinator is Augusta. Please contact her at [phone] to schedule an  appointment. Dr. Mendes's care team's fax number is [phone].     Dr. Mendes sees patients at 3 clinic locations:  [facility]  [address], [place] [address]  [facility]  [address], [place] [address]  [facility]  [address], [place] [address]      Please call the on-call phone number [phone] during evenings, nights and weekends for any urgent needs. Prescription refills must be done during business hours by contacting your surgical team.     Additional Discharge Instructions    -Anticoagulation per medicine team recommendations.  -Analgesics per medicine team.   -Strongly encourage frequent use cold compresses to the left shoulder and left chest wall. Will hold off on compression wrap given concern that this would not be tolerated by the patient and could cause skin breakdown.  -Left upper extremity NV checks Q4H.  -Encourage movement of the left shoulder to prevent adhesive capsulitis. Continue with elbow, wrist, and digital ROM. Avoid use of sling.  -Okay for activity as tolerated of the left upper extremity. Okay to use a walker or cane if tolerated.   -Follow up with Dr. Ayan Mendes at [facility] in 2-3 weeks for a recheck of the left shoulder, or PRN if no concerns. Please call [phone] to schedule.     General info for SNF    Length of Stay Estimate: Short Term Care: Estimated # of Days <30  Condition at Discharge: Improving  Level of care:skilled   Rehabilitation Potential: Excellent  Admission H&P remains valid and up-to-date: Yes  Recent Chemotherapy: N/A  Use Nursing Home Standing Orders: Yes     Mantoux instructions    Give two-step Mantoux (PPD) Per Facility Policy Yes     Follow Up and recommended labs and tests    Follow up with Nursing home physician.  No follow up labs or test are needed.     Reason for your hospital stay    Admitted for falls and will  discharge to TCU. BP was low, home losartan and torsemide stopped, PCP to resume as appropriate     Intake and output    Every shift     Activity - Up with assistive device     Activity - Up with nursing assistance     Physical Therapy Adult Consult    Evaluate and treat as clinically indicated.    Reason:  weakness     Occupational Therapy Adult Consult    Evaluate and treat as clinically indicated.    Reason:  weakness     Fall precautions     Fall precautions     Diet    Follow this diet upon discharge: Orders Placed This Encounter      Combination Diet Regular Diet Adult       Significant Results and Procedures   Most Recent 3 CBC's:  Recent Labs   Lab Test 03/02/24  0607 02/29/24  1744 02/29/24  0614 02/28/24  1214   WBC 6.8  --  7.4 9.0   HGB 9.0* 8.3* 8.5* 10.2*   MCV 96  --  94 90     --  337 384     Most Recent 3 BMP's:  Recent Labs   Lab Test 03/02/24  0928 02/29/24  0614 02/28/24  1214    132* 135   POTASSIUM 5.0 3.9 4.5   CHLORIDE 103 97* 94*   CO2 23 24 25   BUN 15.4 17.2 21.6   CR 0.60* 0.83 1.11   ANIONGAP 10 11 16*   SHAUN 8.5* 8.1* 9.0   * 85 138*     Most Recent 2 LFT's:  Recent Labs   Lab Test 02/28/24  1214 12/23/23  0553   AST 42 22   ALT 26 13   ALKPHOS 138 133   BILITOTAL 0.7 0.9     Most Recent 3 INR's:  Recent Labs   Lab Test 02/28/24  1214   INR 1.87*     Most Recent 3 Hemoglobins:  Recent Labs   Lab Test 03/02/24  0607 02/29/24  1744 02/29/24  0614   HGB 9.0* 8.3* 8.5*     Most Recent 3 Troponin's:No lab results found.  Most Recent 3 BNP's:  Recent Labs   Lab Test 12/04/23  1901   NTBNPI 821     Most Recent D-dimer:No lab results found.,   Results for orders placed or performed during the hospital encounter of 02/28/24   CT Head w/o Contrast    Narrative    EXAM: CT HEAD W/O CONTRAST  2/28/2024 2:01 PM     HISTORY:  fall, blunt trauma, head injury       COMPARISON:  CT 2/26/2024    TECHNIQUE: Using multidetector thin collimation helical acquisition  technique, axial,  coronal and sagittal CT images from the skull base  to the vertex were obtained without intravenous contrast.   (topogram) image(s) also obtained and reviewed. Dose reduction  techniques were performed.    FINDINGS:  No intracranial hemorrhage, mass effect, or midline shift. No acute  loss of gray-white matter differentiation in the cerebral hemispheres.  Ventricles are proportionate to the cerebral sulci. Clear basal  cisterns. Moderate diffuse cerebral volume loss. Mild periventricular  hypoattenuation, consistent with chronic small vessel ischemic  disease.    The bony calvaria and the bones of the skull base are normal. The  visualized portions of the paranasal sinuses and mastoid air cells are  clear. Grossly normal orbits. Age-indeterminate left nasal bone  fracture.      Impression    IMPRESSION:   1. No acute intracranial pathology.   2. Moderate diffuse cerebral volume loss and mild chronic small vessel  ischemic disease.  3. Age-indeterminate left nasal fracture.    BARBIE MG MD         SYSTEM ID:  [id number]   CT Cervical Spine w/o Contrast    Narrative    EXAM: CT CERVICAL SPINE W/O CONTRAST  2/28/2024 2:15 PM     HISTORY: fall, head injury, eval for trauma       COMPARISON: No prior similar studies    TECHNIQUE: Using multidetector thin collimation helical acquisition  technique, axial, coronal and sagittal CT images through the cervical  spine were obtained without intravenous contrast. Dose reduction  techniques were used.    FINDINGS:  No acute fracture or traumatic subluxation. No prevertebral edema.    Mild anterolisthesis at C4-5 and C7-T1 and mild retrolisthesis at  C6-7.    Partial anterior bony fusion C2-C6. Near complete dorsolateral bony  fusion on the left C2-T1 and on the right C2-C5 and C7-T1.    The findings on a level by level basis are as follows:    C2-C3: Bilateral facet arthropathy. No significant spinal canal or  neural foraminal stenosis.     C3-C4: Right eccentric  uncovertebral spurring and facet hypertrophy.  Mild to moderate right neural foraminal stenosis. No spinal canal or  left neural foraminal stenosis.    C4-C5: Left eccentric uncovertebral spurring and facet hypertrophy.  Moderate left neural foraminal stenosis. No spinal canal or right  neural frontal stenosis.    C5-C6: Left eccentric uncovertebral spurring and bilateral facet  hypertrophy. Mild left neural foraminal stenosis. No spinal canal or  right neural foraminal stenosis.    C6-C7: Right eccentric uncovertebral spurring and bilateral facet  hypertrophy. Moderate right and mild to moderate left neural foraminal  stenosis. No spinal canal stenosis.    C7-T1: No significant spinal canal or neural foraminal stenosis.      Impression    IMPRESSION:  1. No acute fracture or posttraumatic subluxation.  2. Multilevel cervical spondylosis as detailed above.     BARBIE MG MD         SYSTEM ID:  [id number]   CT Thoracic Spine w/o Contrast    Narrative    THORACIC AND LUMBAR SPINE CT WITHOUT CONTRAST  2/28/2024 2:01 PM    HISTORY: Fall, trauma; evaluate for injury.    COMPARISON: CT chest, abdomen and pelvis 12/4/2023.    TECHNIQUE: Axial, coronal, and sagittal multiplanar reconstructions  obtained from acquisition of thoracic and lumbar spine CT scan.    FINDINGS:  Thoracic spine: There is no acute fracture or subluxation. There is no  prevertebral edema.     Mild stepwise anterolisthesis T1-T3. Exaggeration of thoracic  kyphosis.    Multilevel degenerative changes with loss of disc height, osteophyte  formation and facet hypertrophy. Bony overgrowth on the right at  T12-L1 results in moderate neural foraminal stenosis. No high-grade  spinal canal or neural foraminal stenosis in the remainder of the  thoracic spine.    No soft tissue abnormality in the visualized paraspinous tissues  anteriorly.    Spinal stimulator enters the spinal canal from L1-L2 interspace with  the tip at mid T7 level.    Lumbar Spine:  There are five type lumbar vertebrae, used for the  purposes of this dictation.     There is new compression fracture of L1 with mild anterior vertebral  body height loss.    Right convex scoliosis of the lumbar spine with the apex at L1. There  is complete anterior bony fusion at L4-L5 and partial anterior bony  fusion at L2-L3. Complete dorsolateral bony fusion on the left at  L2-L3.    Multilevel disc height narrowing, disc degeneration, osteophyte  formation and facet hypertrophy. On a level by level basis, the  findings are as follows:    L1-L2:  Left eccentric disc osteophyte complex and facet hypertrophy.  Mild right and moderate left neural foraminal stenosis. No spinal  canal stenosis.    L2-L3:  Posterior osteophyte formation and bilateral facet  hypertrophy. Mild bilateral neural foraminal stenosis. Mild spinal  canal stenosis.    L3-L4:  Disc bulge, osteophyte formation and bilateral facet  hypertrophy. Moderate to severe right and mild left neural foraminal  stenosis. No spinal canal stenosis.    L4-L5:  Posterior osteophyte formation and bilateral facet  hypertrophy. Mild to moderate right and mild left neural foraminal  stenosis. No spinal canal stenosis.    L5-S1:  Disc osteophyte complex and bilateral facet hypertrophy.  Severe bilateral neural foraminal stenosis. Mild spinal canal  stenosis.    The visualized paraspinous tissues anteriorly are unremarkable.      Impression    IMPRESSION:   1. Thoracic spine:   a. No acute fracture or subluxation.  b. Multilevel spondylosis without high-grade spinal canal stenosis.  Moderate to severe right neural foraminal stenosis at T12-L1 secondary  to bone overgrowth.      2. Lumbar Spine:   a. Compression fracture of L1 with mild anterior vertebral body height  loss, new compared to 12/4/2023 and likely acute. No retropulsion of  the fractured vertebral body.  b. Multilevel lumbar spondylosis as above.    BARBIE MG MD         SYSTEM ID:  [id number]   CT  Lumbar Spine w/o Contrast    Narrative    THORACIC AND LUMBAR SPINE CT WITHOUT CONTRAST  2/28/2024 2:01 PM    HISTORY: Fall, trauma; evaluate for injury.    COMPARISON: CT chest, abdomen and pelvis 12/4/2023.    TECHNIQUE: Axial, coronal, and sagittal multiplanar reconstructions  obtained from acquisition of thoracic and lumbar spine CT scan.    FINDINGS:  Thoracic spine: There is no acute fracture or subluxation. There is no  prevertebral edema.     Mild stepwise anterolisthesis T1-T3. Exaggeration of thoracic  kyphosis.    Multilevel degenerative changes with loss of disc height, osteophyte  formation and facet hypertrophy. Bony overgrowth on the right at  T12-L1 results in moderate neural foraminal stenosis. No high-grade  spinal canal or neural foraminal stenosis in the remainder of the  thoracic spine.    No soft tissue abnormality in the visualized paraspinous tissues  anteriorly.    Spinal stimulator enters the spinal canal from L1-L2 interspace with  the tip at mid T7 level.    Lumbar Spine: There are five type lumbar vertebrae, used for the  purposes of this dictation.     There is new compression fracture of L1 with mild anterior vertebral  body height loss.    Right convex scoliosis of the lumbar spine with the apex at L1. There  is complete anterior bony fusion at L4-L5 and partial anterior bony  fusion at L2-L3. Complete dorsolateral bony fusion on the left at  L2-L3.    Multilevel disc height narrowing, disc degeneration, osteophyte  formation and facet hypertrophy. On a level by level basis, the  findings are as follows:    L1-L2:  Left eccentric disc osteophyte complex and facet hypertrophy.  Mild right and moderate left neural foraminal stenosis. No spinal  canal stenosis.    L2-L3:  Posterior osteophyte formation and bilateral facet  hypertrophy. Mild bilateral neural foraminal stenosis. Mild spinal  canal stenosis.    L3-L4:  Disc bulge, osteophyte formation and bilateral facet  hypertrophy.  Moderate to severe right and mild left neural foraminal  stenosis. No spinal canal stenosis.    L4-L5:  Posterior osteophyte formation and bilateral facet  hypertrophy. Mild to moderate right and mild left neural foraminal  stenosis. No spinal canal stenosis.    L5-S1:  Disc osteophyte complex and bilateral facet hypertrophy.  Severe bilateral neural foraminal stenosis. Mild spinal canal  stenosis.    The visualized paraspinous tissues anteriorly are unremarkable.      Impression    IMPRESSION:   1. Thoracic spine:   a. No acute fracture or subluxation.  b. Multilevel spondylosis without high-grade spinal canal stenosis.  Moderate to severe right neural foraminal stenosis at T12-L1 secondary  to bone overgrowth.      2. Lumbar Spine:   a. Compression fracture of L1 with mild anterior vertebral body height  loss, new compared to 12/4/2023 and likely acute. No retropulsion of  the fractured vertebral body.  b. Multilevel lumbar spondylosis as above.    BARBIE MG MD         SYSTEM ID:  [id number]   CT Chest/Abdomen/Pelvis w Contrast    Narrative    CT CHEST/ABDOMEN/PELVIS WITH CONTRAST 2/28/2024 2:12 PM    CLINICAL HISTORY: Fall, trauma, evaluate for injury.    TECHNIQUE: CT scan of the chest, abdomen, and pelvis was performed  following injection of IV contrast. Multiplanar reformats were  obtained. Dose reduction techniques were used.     CONTRAST: 86mL [id number]    COMPARISON: CT chest, abdomen and pelvis 12/4/2023.    FINDINGS:   LUNGS AND PLEURA: Curvilinear atelectasis or scarring at the right  upper lobe appears stable. A few nodular opacities at right upper lobe  appears stable including a solid 8 mm nodule series 5 image 87. There  are other stable small nodules in this region. No new airspace  disease. Calcified granulomas. No effusion or pneumothorax.    MEDIASTINUM/AXILLAE: No acute mediastinal abnormality. No mediastinal  free fluid or adenopathy identified. Right chest pacemaker.    CORONARY ARTERY  CALCIFICATION: Previous intervention (stents or CABG).    HEPATOBILIARY: Normal.    PANCREAS: Normal.    SPLEEN: Several calcified granulomas of the spleen. No acute splenic  abnormality.    ADRENAL GLANDS: Normal.    KIDNEYS/BLADDER: No significant mass, stones, or hydronephrosis. There  are simple or benign cysts. No follow up is needed. Bladder  unremarkable.    BOWEL: No bowel obstruction. No acute inflammatory change. Moderate  stool within the colon. A few colonic diverticula without  diverticulitis.    PELVIC ORGANS: No acute pelvic abnormality.    ADDITIONAL FINDINGS: Scattered vascular calcifications. No suspicious  lymph nodes.    MUSCULOSKELETAL: Sternotomy. Diffuse degenerative changes of the  spine. Posterior lumbar spine postoperative changes. Subacute healed  bilateral rib fractures with callus. No convincing acute displaced  fracture identified. However, there is the new finding of ill-defined  increased density fluid surrounding the left shoulder joint suggestive  of blood products, series 4 image 7-24. This is difficult to measure  but some of the hematoma appears to be within the shoulder joint or  within the muscles surrounding the left shoulder joint. Some  subcutaneous edema of the left lateral chest wall extending inferiorly  to the left upper abdomen.      Impression    IMPRESSION:  1.  Increased density material involving the left shoulder joint  worrisome for acute hematoma. This is ill-defined but appears to at  least partially extend to the left shoulder joint region and may be  intramuscular involving the muscle surrounding the left shoulder.  2.  No visible acute displaced fracture identified. Please note that  the structures of the left shoulder are incompletely included for  imaging assessment on this exam.   3.  No other acute abnormality identified.  4.  Stable pulmonary nodular opacities on the right.  5.  Subcutaneous edema at the left lateral chest wall extending to the  left  upper abdomen level.       Recommendations for an incidental lung nodule = or > 6mm to 8mm:    Low risk patients: Initial follow-up CT at 6-12 months, then  consider CT at 18-24 months if no change.    High risk patients: Initial follow-up CT at 6-12 months, then CT at  18-24 months if no change.    *Low Risk: Minimal or absent history of smoking or other known risk  factors.  *Nonsolid (ground-glass) or partly solid nodules may require longer  follow-up to exclude indolent adenocarcinoma.  *Recommendations based on Guidelines for the Management of Incidental  Pulmonary Nodules Detected at CT: From the Fleischner Society 2017,  Radiology 2017.    AUDELIA STILES MD         SYSTEM ID:  [id number]   CT Facial Bones without Contrast    Narrative    CT FACIAL BONES WITHOUT CONTRAST 2/28/2024 2:15 PM    History:  fall, trauma, eval for injury    Comparison:  Head CT 2/26/2024      Technique: Using thin collimation multidetector helical acquisition  technique, axial and coronal thin section CT images were reconstructed  through the facial bones. Images were reviewed in bone and soft tissue  windows.    Findings:    Again demonstrated is displaced age indeterminate nasal fracture on  the left. No new fracture. The cribriform plate appears intact.  Alignment of the facial bones appears normal.     There is no hematoma, soft tissue mass or gas visualized within the  orbits. Scattered paranasal sinus mucosal thickening.      Impression    Impression: Again demonstrated is displaced age indeterminate nasal  fracture on the left. No new fracture.     BARBIE MG MD         SYSTEM ID:  [id number]   Humerus XR,  G/E 2 views, left    Narrative    HUMERUS LEFT TWO OR MORE VIEWS   2/28/2024 2:17 PM     HISTORY: Fall, pain.    COMPARISON: None.      Impression    IMPRESSION: The humerus appears normal. There is no evidence of  fracture. There is an os acromiale and there are hypertrophic changes  at the pseudoarthrosis between the os  acromiale and the acromion  proper. Mild osteoarthrosis of the AC joint. Otherwise negative.    TANISHA BARRERA MD         SYSTEM ID:  [id number]   Radius/Ulna XR,  PA &LAT, left    Narrative    FOREARM LEFT 2 VIEWS   2/28/2024 2:17 PM     HISTORY: Fall, arm pain.    COMPARISON: None.      Impression    IMPRESSION: Arterial calcifications. The radius and ulna appear  normal. Positive ulnar variance. 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 There are also degenerative changes at the STT joint and  first CMC joint. There is also chondrocalcinosis in the wrist.    TANISHA BARRERA MD         SYSTEM ID:  [id number]   CT Head w/o Contrast    Narrative    EXAM: CT HEAD W/O CONTRAST  LOCATION: [facility]  DATE: 2/28/2024    INDICATION: Anisocoria (R>L), worsening confusion.  COMPARISON: CT head 02/26/2024  TECHNIQUE: Routine CT Head without IV contrast. Multiplanar reformats. Dose reduction techniques were used.    FINDINGS:  INTRACRANIAL CONTENTS: No intracranial hemorrhage, extraaxial collection, or mass effect.  No CT evidence of acute infarct. Mild presumed chronic small vessel ischemic changes. Mild to moderate generalized volume loss. No hydrocephalus.     VISUALIZED ORBITS/SINUSES/MASTOIDS: No intraorbital abnormality. No paranasal sinus mucosal disease. No middle ear or mastoid effusion.    BONES/SOFT TISSUES: Age-indeterminate left nasal bone fracture.      Impression    IMPRESSION:  1.  No CT evidence for acute intracranial process.  2.  Mild chronic microvascular ischemic changes similar to prior.   XR Lumbar Spine 2/3 Views    Narrative    LUMBAR SPINE TWO TO THREE VIEWS  3/1/2024 12:10 PM     COMPARISON: Lumbar spine CT 2/28/2024.    HISTORY: L1 fracture status post fall-upright.      Impression    IMPRESSION: Posterior spine fusion devices at the L2-L3 and L3-L4  levels again noted. Moderate right convex  curvature of the lumbar  spine centered at L2 again noted. Alignment otherwise normal.  Vertebral body heights normal. No definite fractures. The visualized  bones are osteopenic.    DARIEL ORTEGA MD         SYSTEM ID:  [id number]       Discharge Medications   Current Discharge Medication List        START taking these medications    Details   acetaminophen (TYLENOL) 325 MG tablet Take 3 tablets (975 mg) by mouth every 8 hours as needed for mild pain    Associated Diagnoses: Recurrent falls; Abrasion of right leg, initial encounter      HYDROmorphone (DILAUDID) 2 MG tablet Take 2 tablets (4 mg) by mouth every 4 hours as needed for moderate pain or severe pain    Associated Diagnoses: Recurrent falls; Abrasion of right leg, initial encounter      senna-docusate (SENOKOT-S/PERICOLACE) 8.6-50 MG tablet Take 1 tablet by mouth 2 times daily as needed for constipation    Associated Diagnoses: Recurrent falls; Abrasion of right leg, initial encounter           CONTINUE these medications which have NOT CHANGED    Details   allopurinol (ZYLOPRIM) 300 MG tablet TAKE 1 TABLET(300 MG) BY MOUTH EVERY DAY Strength: 300 mg      atorvastatin (LIPITOR) 40 MG tablet Take 40 mg by mouth daily      B Complex CAPS Take 1 tablet by mouth daily      buPROPion (WELLBUTRIN XL) 150 MG 24 hr tablet Take 300 mg by mouth every morning      busPIRone (BUSPAR) 7.5 MG tablet Take 10 mg by mouth 3 times daily      chlorhexidine (PERIDEX) 0.12 % solution Swish and spit 15 mLs in mouth 2 times daily as needed      dicyclomine (BENTYL) 20 MG tablet Take 20 mg by mouth 3 times daily as needed      DULoxetine (CYMBALTA) 30 MG capsule Take 60 mg by mouth every morning      finasteride (PROSCAR) 5 MG tablet Take 1 tablet by mouth daily      FLOMAX 0.4 MG OR CP24 1 TABLET DAILY AFTER A MEAL  Qty: 30, Refills: 11      isosorbide mononitrate (IMDUR) 60 MG 24 hr tablet Take 90 mg by mouth daily      lidocaine (XYLOCAINE) 5 % external ointment APPLY TOPICALLY  TO THE AFFECTED AREA THREE TIMES DAILY AS NEEDED      medication given by implanted intrathecal pump continuous Drug # 1: Bupivacaine (Marcaine)  - Conc:25 mg/mL - Total Dose / 24 hours: 17.52 mg    Drug # 2: Hydromorphone (Dilaudid)  - Conc:0.8 mg/mL - Total Dose / 24 hours: 0.56 mg    Drug # 3: Fentanyl (Sublimaze) - Conc: 1817.5 mcg/mL - Total Dose / 24 hours: 1273.9 mcg    Diluent: NS    May give boluses every 2 hours for up to 3 boluses per day of hydromorphone at 0.1982mg, fentanyl at 45 mcg and bupivacaine 0.619 mg in a 24 hour period   Pump Reservoir Volume: 40 mL  Outside Clinic & Provider: [facility]   Last Refill Date: 2/27/24  Next Refill Date: 4/16/2024  [place] Alarm Date: 1/20/2024  Pump : Triangulate     Please consider consulting the pain team if the patient is admitted with an IT pump.      metoprolol succinate ER (TOPROL XL) 25 MG 24 hr tablet Take 1 tablet (25 mg) by mouth daily  Qty: 90 tablet, Refills: 0    Associated Diagnoses: Paroxysmal supraventricular tachycardia      MULTIVITAMINS OR TABS 1 tablet daily  Refills: 0      NARCAN 4 MG/0.1ML nasal spray once as needed      nitroGLYcerin (NITROSTAT) 0.4 MG sublingual tablet DISSOLVE 1 TABLET UNDER THE TONGUE AS NEEDED FOR CHEST PAIN EVERY 5 MINUTES AS NEEDED AS DIRECTED      ondansetron (ZOFRAN ODT) 4 MG ODT tab DISSOLVE 1 TABLET(4 MG) ON THE TONGUE EVERY 8 HOURS AS NEEDED FOR NAUSEA OR VOMITING      traZODone (DESYREL) 50 MG tablet Take 75 mg by mouth at bedtime           STOP taking these medications       amphetamine-dextroamphetamine (ADDERALL) 20 MG tablet Comments:   Reason for Stopping:         losartan (COZAAR) 100 MG tablet Comments:   Reason for Stopping:         Potassium Chloride MONICA Comments:   Reason for Stopping:         torsemide (DEMADEX) 10 MG tablet Comments:   Reason for Stopping:             Allergies   Allergies   Allergen Reactions    Armodafinil Other (See Comments)

## 2024-03-03 NOTE — PROGRESS NOTES
Physical Therapy Discharge Summary    Reason for therapy discharge:    Discharged to transitional care facility.    Progress towards therapy goal(s). See goals on Care Plan in [facility] electronic health record for goal details.  Goals partially met.  Barriers to achieving goals:   discharge from facility.    Therapy recommendation(s):    Continued therapy is recommended.  Rationale/Recommendations:  TCU recommended to maximize strength, balance, and functional mobility.

## 2024-03-03 NOTE — PROGRESS NOTES
Care Management Follow Up    Length of Stay (days): 4    Expected Discharge Date: 03/03/2024     Concerns to be Addressed: care coordination/care conferences, discharge planning       Anticipated Discharge Disposition: Transitional Care     Anticipated Discharge Services: None  Anticipated Discharge DME: None    Patient/family educated on Medicare website which has current facility and service quality ratings: yes  Education Provided on the Discharge Plan: Yes  Patient/Family in Agreement with the Plan:      Referrals Placed by CM/SW: Post Acute Facilities      Additional Information:  Additional TCU referrals sent. No accepting facility at this time. SW continues to look for placement.     CARLIE Gomez, LGSW  Emergency Room   Please contact the SW on the floor in which the patient is staying for any questions or concerns

## 2024-03-03 NOTE — PROGRESS NOTES
Care Management Discharge Note    Discharge Date: 03/03/2024       Discharge Disposition: Transitional Care    Discharge Services: None    Discharge DME: None    Discharge Transportation: family or friend will provide    Does the patient's insurance plan have a 3 day qualifying hospital stay waiver?  No    PAS Confirmation Code: [id number]  Patient/family educated on Medicare website which has current facility and service quality ratings: yes    Education Provided on the Discharge Plan: Yes  Persons Notified of Discharge Plans: MD, RN, Wife   Patient/Family in Agreement with the Plan:      Handoff Referral Completed: No    Additional Information:  [facility] is able to accept patient for today. TCU would need orders by 1300. BRANDON hassan MD.     BRANDON LVM for patient's wife requesting a call back.     PAS done, see above.     Addendum 1112: BRANDON spoke with wife. She agrees to plan and will pick patient up at 1700.     Addendum 1145: Updated patient. He is not thrilled about [facility]. He will go today but fears letting go of control. Its not his top choice.     CARLIE Gomez, LGSW  Emergency Room   Please contact the BRANDON on the floor in which the patient is staying for any questions or concerns

## 2024-03-03 NOTE — PROGRESS NOTES
Patient A&Ox4 but forgetful at times. VSS. Up with [facility]. [facility]BM today, is voiding adequately. Cleared to discharge to [facility] TCU this evening. Wife providing transport to facility at 1700.

## 2024-03-04 ENCOUNTER — PATIENT OUTREACH (OUTPATIENT)
Dept: CARE COORDINATION | Facility: CLINIC | Age: 79
End: 2024-03-04

## 2024-03-04 ENCOUNTER — DOCUMENTATION ONLY (OUTPATIENT)
Dept: GERIATRICS | Facility: CLINIC | Age: 79
End: 2024-03-04

## 2024-03-04 ENCOUNTER — TRANSITIONAL CARE UNIT VISIT (OUTPATIENT)
Dept: GERIATRICS | Facility: CLINIC | Age: 79
End: 2024-03-04
Payer: MEDICARE

## 2024-03-04 ENCOUNTER — LAB REQUISITION (OUTPATIENT)
Dept: LAB | Facility: CLINIC | Age: 79
End: 2024-03-04
Payer: MEDICARE

## 2024-03-04 VITALS
HEART RATE: 74 BPM | SYSTOLIC BLOOD PRESSURE: 113 MMHG | BODY MASS INDEX: 31.69 KG/M2 | HEIGHT: 62 IN | OXYGEN SATURATION: 96 % | RESPIRATION RATE: 18 BRPM | DIASTOLIC BLOOD PRESSURE: 73 MMHG | WEIGHT: 172.2 LBS | TEMPERATURE: 98.2 F

## 2024-03-04 DIAGNOSIS — S32.010D COMPRESSION FRACTURE OF L1 VERTEBRA WITH ROUTINE HEALING, SUBSEQUENT ENCOUNTER: Primary | ICD-10-CM

## 2024-03-04 DIAGNOSIS — I82.409 RECURRENT DEEP VEIN THROMBOSIS (DVT) (H): ICD-10-CM

## 2024-03-04 DIAGNOSIS — Z11.1 ENCOUNTER FOR SCREENING FOR RESPIRATORY TUBERCULOSIS: ICD-10-CM

## 2024-03-04 DIAGNOSIS — I10 ESSENTIAL (PRIMARY) HYPERTENSION: ICD-10-CM

## 2024-03-04 DIAGNOSIS — R41.89 COGNITIVE IMPAIRMENT: ICD-10-CM

## 2024-03-04 DIAGNOSIS — D64.9 ANEMIA, UNSPECIFIED: ICD-10-CM

## 2024-03-04 DIAGNOSIS — S40.012D TRAUMATIC HEMATOMA OF LEFT SHOULDER, SUBSEQUENT ENCOUNTER: ICD-10-CM

## 2024-03-04 DIAGNOSIS — I50.9 CHRONIC CONGESTIVE HEART FAILURE, UNSPECIFIED HEART FAILURE TYPE (H): ICD-10-CM

## 2024-03-04 DIAGNOSIS — I10 ESSENTIAL HYPERTENSION, BENIGN: ICD-10-CM

## 2024-03-04 PROCEDURE — 99306 1ST NF CARE HIGH MDM 50: CPT | Performed by: NURSE PRACTITIONER

## 2024-03-04 NOTE — PLAN OF CARE
Occupational Therapy Discharge Summary    Reason for therapy discharge:    Discharged to transitional care facility.    Progress towards therapy goal(s). See goals on Care Plan in [facility] electronic health record for goal details.  Goals not met.  Barriers to achieving goals:   discharge from facility.    Therapy recommendation(s):    Continued therapy is recommended.  Rationale/Recommendations:  Pt remains below baseline level of functioning, limited by impaired activity tolerance and balance. Recommend ongoing skilled OT while IP and in TCU setting to improve strength, functional activity tolerance, balance and safety needed for daily tasks.

## 2024-03-04 NOTE — LETTER
3/4/2024        RE: Eren James  [address] [place] [address]        [facility] GERIATRICS    PRIMARY CARE PROVIDER AND CLINIC:  Jakob Conner MD, MD, NO INFO FOUND / [place] [address]  Chief Complaint   Patient presents with     [facility] Medical Record Number:  [id number]  Place of Service where encounter took place:  [facility] (TCU) [[id number]]    Eren James  is a 78 year old  (1945), admitted to the above facility from  [facility]. Hospital stay 2/28/24 through 3/3/24..       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 NSG and Ortho consulted, plan to follow-up outpatient. Hx recurrent DVT on eliquis PTA was dc'd in the hospital, recent LE venous US 2/9/24 negative for DVT. If DVT in the future, could consider IVC filter. Referral also placed for Neuropsychiatry testing for cognitive impairment evaluation. Noted to have chronic pain with opiate dependence and polypharmacy concerns, PTA adderall was held upon discharge. In addition, patient was hypotensive during hospital stay and PTA torsemide nad losartan were dc'd as well.     [facility] on 3/3/24.      Today's concerns:    During exam, patient seen sitting in wheelchair. Reports pain is ok. Reports L shoulder difficult to move, states swelling is about the same. Admits to chronic constipation, requesting to schedule miralax every day. Admits to good appetite. Sleeping well at night. Denies chest pain, SOB, headache, syncope.      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     See PSH     ASD (atrial septal defect)      Asthma      BPH (benign prostatic hyperplasia)      CHF (congestive heart failure)      Cholelithiasis      Chronic neck pain      COPD (chronic obstructive pulmonary disease) (H)      Coronary artery disease     Previous coronary angioplasties     Deep vein thrombosis      Depressive disorder      Diverticulitis of colon      Esophageal reflux      Gout      Hyperlipidemia      Hypertension      Hypertrophy of prostate with urinary obstruction and other lower urinary tract symptoms (LUTS)      Kidney stone      Subdural hematoma after fall      SVT (supraventricular tachycardia)      Testicular hypofunction       PAST SURGICAL HISTORY:   has a past surgical history that includes Colon surgery for diverticulitis (1970); Tonsillectomy, adenoidectomy, combined (1973); Coronary angiogram with angioplasty; 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  FAMILY HISTORY: family history includes Cardiovascular in his father and mother; Diabetes in his sister.  SOCIAL HISTORY:   reports that he has never smoked. He has never used smokeless tobacco. He reports that he does not drink alcohol and does not use drugs.  Patient's living condition: lives with spouse    Post Discharge Medication Reconciliation Status:   MED REC REQUIRED  Post Medication Reconciliation Status: discharge medications reconciled and changed, per note/orders    Current Outpatient Medications   Medication Sig     acetaminophen (TYLENOL) 325 MG tablet Take 3 tablets (975 mg) by mouth every 8 hours as needed for mild pain     allopurinol (ZYLOPRIM) 300 MG tablet TAKE 1 TABLET(300  MG) BY MOUTH EVERY DAY Strength: 300 mg     atorvastatin (LIPITOR) 40 MG tablet Take 40 mg by mouth daily     B Complex CAPS Take 1 tablet by mouth daily     buPROPion (WELLBUTRIN XL) 150 MG 24 hr tablet Take 300 mg by mouth every morning     busPIRone (BUSPAR) 7.5 MG tablet Take 10 mg by mouth 3 times daily     chlorhexidine (PERIDEX) 0.12 % solution Swish and spit 15 mLs in mouth 2 times daily as needed     dicyclomine (BENTYL) 20 MG tablet Take 20 mg by mouth 3 times daily as needed     DULoxetine (CYMBALTA) 30 MG capsule Take 60 mg by mouth every morning     finasteride (PROSCAR) 5 MG tablet Take 1 tablet by mouth daily     FLOMAX 0.4 MG OR CP24 1 TABLET DAILY AFTER A MEAL     HYDROmorphone (DILAUDID) 4 MG tablet Take 0.5 tablets (2 mg) by mouth every 4 hours as needed for moderate pain or severe pain     isosorbide mononitrate (IMDUR) 60 MG 24 hr tablet Take 90 mg by mouth daily     lidocaine (XYLOCAINE) 5 % external ointment APPLY TOPICALLY TO THE AFFECTED AREA THREE TIMES DAILY AS NEEDED     metoprolol succinate ER (TOPROL XL) 25 MG 24 hr tablet Take 1 tablet (25 mg) by mouth daily     MULTIVITAMINS OR TABS 1 tablet daily     NARCAN 4 MG/0.1ML nasal spray once as needed     nitroGLYcerin (NITROSTAT) 0.4 MG sublingual tablet DISSOLVE 1 TABLET UNDER THE TONGUE AS NEEDED FOR CHEST PAIN EVERY 5 MINUTES AS NEEDED AS DIRECTED     ondansetron (ZOFRAN ODT) 4 MG ODT tab DISSOLVE 1 TABLET(4 MG) ON THE TONGUE EVERY 8 HOURS AS NEEDED FOR NAUSEA OR VOMITING     senna-docusate (SENOKOT-S/PERICOLACE) 8.6-50 MG tablet Take 1 tablet by mouth 2 times daily as needed for constipation     traZODone (DESYREL) 50 MG tablet Take 75 mg by mouth at bedtime     medication given by implanted intrathecal pump continuous Drug # 1: Bupivacaine (Marcaine)  - Conc:25 mg/mL - Total Dose / 24 hours: 17.52 mg    Drug # 2: Hydromorphone (Dilaudid)  - Conc:0.8 mg/mL - Total Dose / 24 hours: 0.56 mg    Drug # 3: Fentanyl (Sublimaze) - Conc:  "1817.5 mcg/mL - Total Dose / 24 hours: 1273.9 mcg    Diluent: NS    May give boluses every 2 hours for up to 3 boluses per day of hydromorphone at 0.1982mg, fentanyl at 45 mcg and bupivacaine 0.619 mg in a 24 hour period   Pump Reservoir Volume: 40 mL  Outside Clinic & Provider: [facility]   Last Refill Date: 2/27/24  Next Refill Date: 4/16/2024  [place] Alarm Date: 1/20/2024  Pump : FunnelFire     Please consider consulting the pain team if the patient is admitted with an IT pump.     No current facility-administered medications for this visit.       ROS:  10 point ROS of systems including Constitutional, Eyes, Respiratory, Cardiovascular, Gastroenterology, Genitourinary, Integumentary, Musculoskeletal, Psychiatric were all negative except for pertinent positives noted in my HPI.      Vitals:  /73   Pulse 74   Temp 98.2  F (36.8  C)   Resp 18   Ht 1.575 m (5' 2\")   Wt 78.1 kg (172 lb 3.2 oz)   SpO2 96%   BMI 31.50 kg/m    Exam:  GENERAL APPEARANCE:  Alert, in no distress, oriented, cooperative  ENT:  Mouth and posterior oropharynx normal, moist mucous membranes, [place]  EYES:  EOM, conjunctivae, lids, pupils and irises normal, PERRL  RESP:  respiratory effort and palpation of chest normal, lungs clear to auscultation , no respiratory distress  CV:  regular rate and rhythm, no murmur, rub, or gallop, BLE edema  ABDOMEN:  normal bowel sounds, soft, nontender, no guarding or rebound  M/S:   Gait and station abnormal, sitting in wheelchair.  SKIN:  Inspection of skin and subcutaneous tissue baseline, Palpation of skin and subcutaneous tissue baseline. Multiple bruises to face and arms. Large hematoma to LUE with swelling.  NEURO:   Cranial nerves 2-12 are normal tested and grossly at patient's baseline, Examination of sensation by touch normal  PSYCH:  oriented X 3, memory impaired , affect and mood normal    Wt Readings from Last 4 Encounters:   03/04/24 78.1 kg (172 lb 3.2 oz) "   02/28/24 78.8 kg (173 lb 11.2 oz)   12/22/23 77.1 kg (170 lb)   10/27/23 84.9 kg (187 lb 1.6 oz)         Lab/Diagnostic data:  Recent labs in [facility] reviewed by me today.   Most Recent 3 CBC's:  Recent Labs   Lab Test 03/02/24  0607 02/29/24  1744 02/29/24  0614 02/28/24  1214   WBC 6.8  --  7.4 9.0   HGB 9.0* 8.3* 8.5* 10.2*   MCV 96  --  94 90     --  337 384     Most Recent 3 BMP's:  Recent Labs   Lab Test 03/02/24  0928 02/29/24  0614 02/28/24  1214    132* 135   POTASSIUM 5.0 3.9 4.5   CHLORIDE 103 97* 94*   CO2 23 24 25   BUN 15.4 17.2 21.6   CR 0.60* 0.83 1.11   ANIONGAP 10 11 16*   SHAUN 8.5* 8.1* 9.0   * 85 138*     Liver Function Studies -   Recent Labs   Lab Test 02/28/24  1214   PROTTOTAL 6.6   ALBUMIN 3.6   BILITOTAL 0.7   ALKPHOS 138   AST 42   ALT 26       TSH   Date Value Ref Range Status   12/04/2023 3.20 0.30 - 4.20 uIU/mL Final   03/09/2005 2.18 0.4 - 5.0 mU/L Final       Lab Results   Component Value Date    A1C 6.0 01/15/2007       Estimated Creatinine Clearance: 88.9 mL/min (A) (based on SCr of 0.6 mg/dL (L)).        ASSESSMENT/PLAN:    (S32.010D) Compression fracture of L1 vertebra with routine healing, subsequent encounter  (primary encounter diagnosis)  (S40.012D) Traumatic hematoma of left shoulder, subsequent encounter  Comment: Acute L1 compression fracture and L shoulder hematoma r/t fall on 2/28/24. Hx recurrent falls. Chronic pain w/opiate dependence, s/p intrathecal pain pump. PTA lives w/spouse.   Plan:   - Check BMP, Hgb on 3/5/24 dx anemia, HTN  - Continue PT, OT at [facility]  - Elevate LUE w/pillow while in wheelchair or in bed  - Check BMP, Hgb on 3/5/24 dx anemia, HTN  - Continue LUE neurovascular checks   - Continue tylenol PRN, dilaudid PRN  - Patient to follow-up with Ortho as directed; prefers to follow-up with [facility] team  - Intrathecal pump: Patient to follow-up with [facility] as directed  - SW following for discharge planning. Likely will need  alternative discharge placement.    (I82.409) Recurrent deep vein thrombosis (DVT) (H)  Comment: Hx recurrent DVT, last BLE venous US 2/9/24 negative for DVT. PTA eliquis dc'd due to recurrent falls.  Plan:   - Monitor symptoms. Consider IVC filter if develops new DVT.    (I50.9) Chronic congestive heart failure, unspecified heart failure type (H)  (I10) Essential hypertension, benign  Comment: Chronic CHF w/preserved EF. HTN w/recent hypotension episodes, PTA torsemide, potassium, losartan dc'd.    Plan:   - Continue imdur, lipitor, metoprolol  - Monitor BP/HR, weights    (R41.89) Cognitive impairment  Comment: Chronic cognitive impairment. PTA adderall dc'd.  Plan:   - OT to complete cognitive testing  - Referral to Neuropsychiatry Clinic placed during hospital stay  - Continue wellbutrin, buspar, cymbalta, trazodone  - Monitor changes in mood or behaviors      Orders:  - Patient to follow-up with [facility] (Dr. Jakob Cuevas) for L1 compression fracture  - Check daily weights dx CHF  - Add miralax 17g PO every day, hold if loose stools dx constipation  - Therapy to eval/treat edema  - Elevate LUE w/pillow while in wheelchair or in bed  - Check BMP, Hgb on 3/5/24 dx anemia, HTN        Total time spent during today's visit was 50 mins including patient visit and review of past records.     Electronically signed by:  FARZANA Mclaughlin CNP                     Sincerely,        FARZANA Mclaughlin CNP

## 2024-03-04 NOTE — PROGRESS NOTES
[facility]    Background: Transitional Care Management program identified per system criteria and reviewed by [facility] team for possible outreach.    Assessment: Upon chart review, CCRC Team member will not proceed with patient outreach related to this episode of Transitional Care Management program due to reason below:    Non-[facility] TCU: CCRC team member noted patient discharged to TCU/ARU/LTACH. Patient is not established with a [facility] currently supported by Primary Care-Care Coordination therefore handoff to Primary Care-Care Coordination is not appropriate at this time.    Plan: Transitional Care Management episode addressed appropriately per reason noted above.      JEREMY Michaud  [facility] [place], [facility]    *Connected Care Resource Team does NOT follow patient ongoing. Referrals are identified based on internal discharge reports and the outreach is to ensure patient has an understanding of their discharge instructions.

## 2024-03-04 NOTE — PROGRESS NOTES
[facility] GERIATRICS    PRIMARY CARE PROVIDER AND CLINIC:  Jakob Conner MD, MD, NO INFO FOUND / [facility] [address]  Chief Complaint   Patient presents with    [facility] Medical Record Number:  [id number]  Place of Service where encounter took place:  [facility] (TCU) [[id number]]    Eren James  is a 78 year old  (1945), admitted to the above facility from  [facility]. Hospital stay 2/28/24 through 3/3/24..       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 2/28-3/3/24 due to recurrent falls, left shoulder hematoma, L1 compression fracture and multiple ecchymosis on the body. NSG and Ortho consulted, plan to follow-up outpatient. Hx recurrent DVT on eliquis PTA was dc'd in the hospital, recent LE venous US 2/9/24 negative for DVT. If DVT in the future, could consider IVC filter. Referral also placed for Neuropsychiatry testing for cognitive impairment evaluation. Noted to have chronic pain with opiate dependence and polypharmacy concerns, PTA adderall was held upon discharge. In addition, patient was hypotensive during hospital stay and PTA torsemide nad losartan were dc'd as well.     Transferred to [facility] on 3/3/24.      Today's concerns:    During exam, patient seen sitting in wheelchair. Reports pain is ok. Reports L shoulder difficult to move, states swelling is about the same. Admits to chronic constipation, requesting to schedule miralax every day. Admits to good appetite. Sleeping well at night. Denies chest pain, SOB, headache, syncope.      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     See PSH    ASD (atrial septal defect)     Asthma     BPH (benign prostatic hyperplasia)     CHF (congestive heart failure)     Cholelithiasis     Chronic neck pain     COPD (chronic obstructive pulmonary disease) (H)     Coronary artery disease     Previous coronary angioplasties    Deep vein thrombosis     Depressive disorder     Diverticulitis of colon     Esophageal reflux     Gout     Hyperlipidemia     Hypertension     Hypertrophy of prostate with urinary obstruction and other lower urinary tract symptoms (LUTS)     Kidney stone     Subdural hematoma after fall     SVT (supraventricular tachycardia)     Testicular hypofunction       PAST SURGICAL HISTORY:   has a past surgical history that includes Colon surgery for diverticulitis (1970); Tonsillectomy, adenoidectomy, combined (1973); Coronary angiogram with angioplasty; 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  FAMILY HISTORY: family history includes Cardiovascular in his father and mother; Diabetes in his sister.  SOCIAL HISTORY:   reports that he has never smoked. He has never used smokeless tobacco. He reports that he does not drink alcohol and does not use drugs.  Patient's living condition: lives with spouse    Post Discharge Medication Reconciliation Status:   MED REC REQUIRED  Post Medication Reconciliation Status: discharge medications reconciled and changed, per note/orders    Current Outpatient Medications   Medication Sig    acetaminophen (TYLENOL) 325 MG tablet Take 3 tablets (975 mg) by mouth every 8 hours as needed for mild pain    allopurinol (ZYLOPRIM) 300 MG tablet TAKE 1 TABLET(300 MG) BY MOUTH EVERY DAY Strength: 300 mg    atorvastatin (LIPITOR) 40 MG tablet Take 40 mg by mouth daily    B Complex CAPS  Take 1 tablet by mouth daily    buPROPion (WELLBUTRIN XL) 150 MG 24 hr tablet Take 300 mg by mouth every morning    busPIRone (BUSPAR) 7.5 MG tablet Take 10 mg by mouth 3 times daily    chlorhexidine (PERIDEX) 0.12 % solution Swish and spit 15 mLs in mouth 2 times daily as needed    dicyclomine (BENTYL) 20 MG tablet Take 20 mg by mouth 3 times daily as needed    DULoxetine (CYMBALTA) 30 MG capsule Take 60 mg by mouth every morning    finasteride (PROSCAR) 5 MG tablet Take 1 tablet by mouth daily    FLOMAX 0.4 MG OR CP24 1 TABLET DAILY AFTER A MEAL    HYDROmorphone (DILAUDID) 4 MG tablet Take 0.5 tablets (2 mg) by mouth every 4 hours as needed for moderate pain or severe pain    isosorbide mononitrate (IMDUR) 60 MG 24 hr tablet Take 90 mg by mouth daily    lidocaine (XYLOCAINE) 5 % external ointment APPLY TOPICALLY TO THE AFFECTED AREA THREE TIMES DAILY AS NEEDED    metoprolol succinate ER (TOPROL XL) 25 MG 24 hr tablet Take 1 tablet (25 mg) by mouth daily    MULTIVITAMINS OR TABS 1 tablet daily    NARCAN 4 MG/0.1ML nasal spray once as needed    nitroGLYcerin (NITROSTAT) 0.4 MG sublingual tablet DISSOLVE 1 TABLET UNDER THE TONGUE AS NEEDED FOR CHEST PAIN EVERY 5 MINUTES AS NEEDED AS DIRECTED    ondansetron (ZOFRAN ODT) 4 MG ODT tab DISSOLVE 1 TABLET(4 MG) ON THE TONGUE EVERY 8 HOURS AS NEEDED FOR NAUSEA OR VOMITING    senna-docusate (SENOKOT-S/PERICOLACE) 8.6-50 MG tablet Take 1 tablet by mouth 2 times daily as needed for constipation    traZODone (DESYREL) 50 MG tablet Take 75 mg by mouth at bedtime    medication given by implanted intrathecal pump continuous Drug # 1: Bupivacaine (Marcaine)  - Conc:25 mg/mL - Total Dose / 24 hours: 17.52 mg    Drug # 2: Hydromorphone (Dilaudid)  - Conc:0.8 mg/mL - Total Dose / 24 hours: 0.56 mg    Drug # 3: Fentanyl (Sublimaze) - Conc: 1817.5 mcg/mL - Total Dose / 24 hours: 1273.9 mcg    Diluent: NS    May give boluses every 2 hours for up to 3 boluses per day of hydromorphone at  "0.1982mg, fentanyl at 45 mcg and bupivacaine 0.619 mg in a 24 hour period   Pump Reservoir Volume: 40 mL  Outside Clinic & Provider: [facility]   Last Refill Date: 2/27/24  Next Refill Date: 4/16/2024  Low Deltana Alarm Date: 1/20/2024  Pump : [employer]     Please consider consulting the pain team if the patient is admitted with an IT pump.     No current facility-administered medications for this visit.       ROS:  10 point ROS of systems including Constitutional, Eyes, Respiratory, Cardiovascular, Gastroenterology, Genitourinary, Integumentary, Musculoskeletal, Psychiatric were all negative except for pertinent positives noted in my HPI.      Vitals:  /73   Pulse 74   Temp 98.2  F (36.8  C)   Resp 18   Ht 1.575 m (5' 2\")   Wt 78.1 kg (172 lb 3.2 oz)   SpO2 96%   BMI 31.50 kg/m    Exam:  GENERAL APPEARANCE:  Alert, in no distress, oriented, cooperative  ENT:  Mouth and posterior oropharynx normal, moist mucous membranes, [place]  EYES:  EOM, conjunctivae, lids, pupils and irises normal, PERRL  RESP:  respiratory effort and palpation of chest normal, lungs clear to auscultation , no respiratory distress  CV:  regular rate and rhythm, no murmur, rub, or gallop, BLE edema  ABDOMEN:  normal bowel sounds, soft, nontender, no guarding or rebound  M/S:   Gait and station abnormal, sitting in wheelchair.  SKIN:  Inspection of skin and subcutaneous tissue baseline, Palpation of skin and subcutaneous tissue baseline. Multiple bruises to face and arms. Large hematoma to LUE with swelling.  NEURO:   Cranial nerves 2-12 are normal tested and grossly at patient's baseline, Examination of sensation by touch normal  PSYCH:  oriented X 3, memory impaired , affect and mood normal    Wt Readings from Last 4 Encounters:   03/04/24 78.1 kg (172 lb 3.2 oz)   02/28/24 78.8 kg (173 lb 11.2 oz)   12/22/23 77.1 kg (170 lb)   10/27/23 84.9 kg (187 lb 1.6 oz)         Lab/Diagnostic data:  Recent labs in [facility] " reviewed by me today.   Most Recent 3 CBC's:  Recent Labs   Lab Test 03/02/24  0607 02/29/24  1744 02/29/24  0614 02/28/24  1214   WBC 6.8  --  7.4 9.0   HGB 9.0* 8.3* 8.5* 10.2*   MCV 96  --  94 90     --  337 384     Most Recent 3 BMP's:  Recent Labs   Lab Test 03/02/24  0928 02/29/24  0614 02/28/24  1214    132* 135   POTASSIUM 5.0 3.9 4.5   CHLORIDE 103 97* 94*   CO2 23 24 25   BUN 15.4 17.2 21.6   CR 0.60* 0.83 1.11   ANIONGAP 10 11 16*   SHAUN 8.5* 8.1* 9.0   * 85 138*     Liver Function Studies -   Recent Labs   Lab Test 02/28/24  1214   PROTTOTAL 6.6   ALBUMIN 3.6   BILITOTAL 0.7   ALKPHOS 138   AST 42   ALT 26       TSH   Date Value Ref Range Status   12/04/2023 3.20 0.30 - 4.20 uIU/mL Final   03/09/2005 2.18 0.4 - 5.0 mU/L Final       Lab Results   Component Value Date    A1C 6.0 01/15/2007       Estimated Creatinine Clearance: 88.9 mL/min (A) (based on SCr of 0.6 mg/dL (L)).        ASSESSMENT/PLAN:    (S32.010D) Compression fracture of L1 vertebra with routine healing, subsequent encounter  (primary encounter diagnosis)  (S40.012D) Traumatic hematoma of left shoulder, subsequent encounter  Comment: Acute L1 compression fracture and L shoulder hematoma r/t fall on 2/28/24. Hx recurrent falls. Chronic pain w/opiate dependence, s/p intrathecal pain pump. PTA lives w/spouse.   Plan:   - Check BMP, Hgb on 3/5/24 dx anemia, HTN  - Continue PT, OT at TCU  - Elevate LUE w/pillow while in wheelchair or in bed  - Check BMP, Hgb on 3/5/24 dx anemia, HTN  - Continue LUE neurovascular checks   - Continue tylenol PRN, dilaudid PRN  - Patient to follow-up with Ortho as directed; prefers to follow-up with [facility] team  - Intrathecal pump: Patient to follow-up with [facility] as directed  - SW following for discharge planning. Likely will need alternative discharge placement.    (I82.409) Recurrent deep vein thrombosis (DVT) (H)  Comment: Hx recurrent DVT, last BLE venous US 2/9/24 negative  for DVT. PTA eliquis dc'd due to recurrent falls.  Plan:   - Monitor symptoms. Consider IVC filter if develops new DVT.    (I50.9) Chronic congestive heart failure, unspecified heart failure type (H)  (I10) Essential hypertension, benign  Comment: Chronic CHF w/preserved EF. HTN w/recent hypotension episodes, PTA torsemide, potassium, losartan dc'd.    Plan:   - Continue imdur, lipitor, metoprolol  - Monitor BP/HR, weights    (R41.89) Cognitive impairment  Comment: Chronic cognitive impairment. PTA adderall dc'd.  Plan:   - OT to complete cognitive testing  - Referral to Neuropsychiatry Clinic placed during hospital stay  - Continue wellbutrin, buspar, cymbalta, trazodone  - Monitor changes in mood or behaviors      Orders:  - Patient to follow-up with [facility] (Dr. Jakob Cuevas) for L1 compression fracture  - Check daily weights dx CHF  - Add miralax 17g PO every day, hold if loose stools dx constipation  - Therapy to eval/treat edema  - Elevate LUE w/pillow while in wheelchair or in bed  - Check BMP, Hgb on 3/5/24 dx anemia, HTN        Total time spent during today's visit was 50 mins including patient visit and review of past records.     Electronically signed by:  FARZANA Mclaughlin CNP

## 2024-03-05 ENCOUNTER — DOCUMENTATION ONLY (OUTPATIENT)
Dept: OTHER | Facility: CLINIC | Age: 79
End: 2024-03-05
Payer: MEDICARE

## 2024-03-05 LAB
ANION GAP SERPL CALCULATED.3IONS-SCNC: 8 MMOL/L (ref 7–15)
BUN SERPL-MCNC: 11.6 MG/DL (ref 8–23)
CALCIUM SERPL-MCNC: 9.1 MG/DL (ref 8.8–10.2)
CHLORIDE SERPL-SCNC: 106 MMOL/L (ref 98–107)
CREAT SERPL-MCNC: 0.61 MG/DL (ref 0.67–1.17)
DEPRECATED HCO3 PLAS-SCNC: 26 MMOL/L (ref 22–29)
EGFRCR SERPLBLD CKD-EPI 2021: >90 ML/MIN/1.73M2
GLUCOSE SERPL-MCNC: 105 MG/DL (ref 70–99)
HGB BLD-MCNC: 8.8 G/DL (ref 13.3–17.7)
POTASSIUM SERPL-SCNC: 4.5 MMOL/L (ref 3.4–5.3)
SODIUM SERPL-SCNC: 140 MMOL/L (ref 135–145)

## 2024-03-05 PROCEDURE — 36415 COLL VENOUS BLD VENIPUNCTURE: CPT | Performed by: NURSE PRACTITIONER

## 2024-03-05 PROCEDURE — P9604 ONE-WAY ALLOW PRORATED TRIP: HCPCS | Performed by: NURSE PRACTITIONER

## 2024-03-05 PROCEDURE — 86481 TB AG RESPONSE T-CELL SUSP: CPT | Performed by: NURSE PRACTITIONER

## 2024-03-05 PROCEDURE — 85018 HEMOGLOBIN: CPT | Performed by: NURSE PRACTITIONER

## 2024-03-05 PROCEDURE — 80048 BASIC METABOLIC PNL TOTAL CA: CPT | Performed by: NURSE PRACTITIONER

## 2024-03-06 LAB
GAMMA INTERFERON BACKGROUND BLD IA-ACNC: 0.08 IU/ML
M TB IFN-G BLD-IMP: NEGATIVE
M TB IFN-G CD4+ BCKGRND COR BLD-ACNC: 1.35 IU/ML
MITOGEN IGNF BCKGRD COR BLD-ACNC: -0.01 IU/ML
MITOGEN IGNF BCKGRD COR BLD-ACNC: -0.01 IU/ML
QUANTIFERON MITOGEN: 1.43 IU/ML
QUANTIFERON NIL TUBE: 0.08 IU/ML
QUANTIFERON TB1 TUBE: 0.07 IU/ML
QUANTIFERON TB2 TUBE: 0.07

## 2024-03-08 ENCOUNTER — TRANSITIONAL CARE UNIT VISIT (OUTPATIENT)
Dept: GERIATRICS | Facility: CLINIC | Age: 79
End: 2024-03-08
Payer: MEDICARE

## 2024-03-08 VITALS
RESPIRATION RATE: 18 BRPM | TEMPERATURE: 98 F | HEIGHT: 62 IN | DIASTOLIC BLOOD PRESSURE: 84 MMHG | OXYGEN SATURATION: 94 % | WEIGHT: 177.6 LBS | SYSTOLIC BLOOD PRESSURE: 134 MMHG | HEART RATE: 58 BPM | BODY MASS INDEX: 32.68 KG/M2

## 2024-03-08 DIAGNOSIS — I50.9 CHRONIC CONGESTIVE HEART FAILURE, UNSPECIFIED HEART FAILURE TYPE (H): ICD-10-CM

## 2024-03-08 DIAGNOSIS — I82.409 RECURRENT DEEP VEIN THROMBOSIS (DVT) (H): ICD-10-CM

## 2024-03-08 DIAGNOSIS — S32.010D COMPRESSION FRACTURE OF L1 VERTEBRA WITH ROUTINE HEALING, SUBSEQUENT ENCOUNTER: Primary | ICD-10-CM

## 2024-03-08 DIAGNOSIS — D64.9 ANEMIA, UNSPECIFIED TYPE: ICD-10-CM

## 2024-03-08 DIAGNOSIS — G89.4 CHRONIC PAIN SYNDROME: ICD-10-CM

## 2024-03-08 DIAGNOSIS — S40.012D TRAUMATIC HEMATOMA OF LEFT SHOULDER, SUBSEQUENT ENCOUNTER: ICD-10-CM

## 2024-03-08 DIAGNOSIS — I10 ESSENTIAL HYPERTENSION, BENIGN: ICD-10-CM

## 2024-03-08 PROCEDURE — 99309 SBSQ NF CARE MODERATE MDM 30: CPT | Performed by: NURSE PRACTITIONER

## 2024-03-08 RX ORDER — HYDROMORPHONE HYDROCHLORIDE 4 MG/1
2 TABLET ORAL EVERY 4 HOURS PRN
Qty: 30 TABLET | Refills: 0 | Status: SHIPPED | OUTPATIENT
Start: 2024-03-08 | End: 2024-03-11 | Stop reason: DRUGHIGH

## 2024-03-08 NOTE — PROGRESS NOTES
"[facility] GERIATRICS    Chief Complaint   Patient presents with    RECHECK     HPI:  Eren James is a 78 year old  (1945), who is being seen today for an episodic care visit at: [facility] ([facility]) [[id number]].       Today's concern is:     During exam, patient seen sitting in wheelchair with RN present. Endorses increased swelling and discomfort to LLE, hx DVT. Does admit to ongoing back pain, moderately controlled w/dilaudid PRN. RN reports additional pain assessment reminders were added as part of patient's care plan. Patient reports having pain pump with device at home, PTA was giving himself intermittent boluses for pain relief, followed by [facility]. Denies constipation. Denies chest pain, SOB, headache, syncope.      Allergies, and PMH/PSH reviewed in EPIC today.    REVIEW OF SYSTEMS:  4 point ROS including Respiratory, CV, GI and , other than that noted in the HPI,  is negative      Objective:   /84   Pulse 58   Temp 98  F (36.7  C)   Resp 18   Ht 1.575 m (5' 2\")   Wt 80.6 kg (177 lb 9.6 oz)   SpO2 94%   BMI 32.48 kg/m    GENERAL APPEARANCE:  Alert, in no distress, oriented, cooperative  RESP:  respiratory effort and palpation of chest normal, lungs clear to auscultation , no respiratory distress  CV:  regular rate and rhythm, no murmur, rub, or gallop, +3 LLE edema, +1 RLE edema.   ABDOMEN:  normal bowel sounds, soft, nontender, no guarding or rebound  M/S:   Gait and station abnormal, sitting in wheelchair.  SKIN:  Inspection of skin and subcutaneous tissue baseline, Palpation of skin and subcutaneous tissue baseline. Multiple bruises to face at end stages of healing. Multiple scabs to BLE. LUE bruising improving, wearing LUE sleeve.  NEURO:   Cranial nerves 2-12 are normal tested and grossly at patient's baseline, Examination of sensation by touch normal  PSYCH:  oriented X 3, memory impaired , affect and mood normal    Wt Readings from Last 4 " Encounters:   03/08/24 80.6 kg (177 lb 9.6 oz)   03/04/24 78.1 kg (172 lb 3.2 oz)   02/28/24 78.8 kg (173 lb 11.2 oz)   12/22/23 77.1 kg (170 lb)       Recent labs in [facility] reviewed by me today.    Most Recent 3 CBC's:  Recent Labs   Lab Test 03/05/24  0515 03/02/24  0607 02/29/24  1744 02/29/24  0614 02/28/24  1214   WBC  --  6.8  --  7.4 9.0   HGB 8.8* 9.0* 8.3* 8.5* 10.2*   MCV  --  96  --  94 90   PLT  --  422  --  337 384     Most Recent 3 BMP's:  Recent Labs   Lab Test 03/05/24  0515 03/02/24  0928 02/29/24  0614    136 132*   POTASSIUM 4.5 5.0 3.9   CHLORIDE 106 103 97*   CO2 26 23 24   BUN 11.6 15.4 17.2   CR 0.61* 0.60* 0.83   ANIONGAP 8 10 11   SHAUN 9.1 8.5* 8.1*   * 115* 85         Assessment/Plan:    (S32.010D) Compression fracture of L1 vertebra with routine healing, subsequent encounter  (primary encounter diagnosis)  (S40.012D) Traumatic hematoma of left shoulder, subsequent encounter  (G89.4) Chronic pain syndrome  Comment: Acute L1 compression fracture and L shoulder hematoma r/t fall on 2/28/24. Hx recurrent falls. Chronic pain w/opiate dependence, s/p intrathecal pain pump. Pain moderately controlled.  PTA lives w/spouse. Ambulates 100ft w/walker. Requires CGA w/transfers.  Plan:   - Continue dilaudid PRN, tylenol PRN  - Intrathecal pump: Patient to follow-up with [facility] as directed. Reviewed with RN, plans to follow-up with family regarding plan for bolus device and follow-up with pain clinic for next refill.   - Patient to follow-up with Ortho as directed; prefers to follow-up with [facility] team   - Continue PT, OT at [facility]  -  following for discharge planning.     (I50.9) Chronic congestive heart failure, unspecified heart failure type (H)  (I10) Essential hypertension, benign  Comment: Chronic CHF w/preserved EF. HTN w/recent hypotension episodes, PTA torsemide, potassium, losartan dc'd. Weight trending up, LLE edema > RLE.  Plan:   - Continue imdur, lipitor,  metoprolol  - Monitor BP/HR, weights  UPDATE: Reviewed venous US results, plan to add torsemide 20mg every day and recheck labs on Monday.    (I82.409) Recurrent deep vein thrombosis (DVT) (H)  Comment: Hx recurrent DVT, last BLE venous US 2/9/24 negative for DVT. PTA eliquis dc'd due to recurrent falls. Increased edema to LLE likely r/t DVT vs CHF (PTA torsemide also dc'd during hospital stay due to hypotension)  Plan:   - Monitor symptoms. Consider IVC filter if develops new DVT.  - LLE venous US 3/8/24, results pending.   UPDATE: LLE venous US 3/8/24 negative for DVT.     (D64.9) Anemia, unspecified type  Comment: Acute on chronic anemia, Hgb baseline 10-11. Hgb 8-9 since fall, Hgb appears stable. No active sx of bleeding.  Plan:   - Check Hgb, iron level, iron saturation, ferritin on 3/11/24      Orders:  - Check BMP, BNP, Hgb, iron level, iron saturation, ferritin on 3/11/24 dx anemia   - Add torsemide 20mg every day, start on 3/9/24  dx CHF      Electronically signed by: FARZANA Mclaughlin CNP

## 2024-03-11 ENCOUNTER — TRANSITIONAL CARE UNIT VISIT (OUTPATIENT)
Dept: GERIATRICS | Facility: CLINIC | Age: 79
End: 2024-03-11
Payer: MEDICARE

## 2024-03-11 ENCOUNTER — LAB REQUISITION (OUTPATIENT)
Dept: LAB | Facility: CLINIC | Age: 79
End: 2024-03-11
Payer: MEDICARE

## 2024-03-11 VITALS
HEART RATE: 77 BPM | HEIGHT: 62 IN | DIASTOLIC BLOOD PRESSURE: 70 MMHG | SYSTOLIC BLOOD PRESSURE: 116 MMHG | RESPIRATION RATE: 18 BRPM | WEIGHT: 177.2 LBS | BODY MASS INDEX: 32.61 KG/M2 | TEMPERATURE: 97.5 F | OXYGEN SATURATION: 92 %

## 2024-03-11 DIAGNOSIS — D64.9 ANEMIA, UNSPECIFIED: ICD-10-CM

## 2024-03-11 DIAGNOSIS — S40.012D TRAUMATIC HEMATOMA OF LEFT SHOULDER, SUBSEQUENT ENCOUNTER: ICD-10-CM

## 2024-03-11 DIAGNOSIS — S32.010D COMPRESSION FRACTURE OF L1 VERTEBRA WITH ROUTINE HEALING, SUBSEQUENT ENCOUNTER: Primary | ICD-10-CM

## 2024-03-11 DIAGNOSIS — I10 ESSENTIAL HYPERTENSION, BENIGN: ICD-10-CM

## 2024-03-11 DIAGNOSIS — G89.4 CHRONIC PAIN SYNDROME: ICD-10-CM

## 2024-03-11 DIAGNOSIS — I50.9 CHRONIC CONGESTIVE HEART FAILURE, UNSPECIFIED HEART FAILURE TYPE (H): ICD-10-CM

## 2024-03-11 PROCEDURE — 99309 SBSQ NF CARE MODERATE MDM 30: CPT | Performed by: NURSE PRACTITIONER

## 2024-03-11 RX ORDER — TORSEMIDE 20 MG/1
20 TABLET ORAL DAILY
COMMUNITY
End: 2024-03-21

## 2024-03-11 RX ORDER — POLYETHYLENE GLYCOL 3350 17 G/17G
1 POWDER, FOR SOLUTION ORAL
COMMUNITY
End: 2024-03-21

## 2024-03-11 RX ORDER — HYDROMORPHONE HYDROCHLORIDE 2 MG/1
2-4 TABLET ORAL EVERY 4 HOURS PRN
Qty: 60 TABLET | Refills: 0 | Status: SHIPPED | OUTPATIENT
Start: 2024-03-11

## 2024-03-11 NOTE — PROGRESS NOTES
"[facility] GERIATRICS    Chief Complaint   Patient presents with    RECHECK     HPI:  Eren James is a 78 year old  (1945), who is being seen today for an episodic care visit at: [facility] ([facility]) [[id number]].       Today's concern is:     Patient reports chronic back pain 7/10, not using bolus device for intrathecal pain pump. States dilaudid 2mg not effective. Denies constipation, diarrhea. Denies chest pain, SOB, headache, syncope.      Allergies, and PMH/PSH reviewed in [facility] today.    REVIEW OF SYSTEMS:  4 point ROS including Respiratory, CV, GI and , other than that noted in the HPI,  is negative    Objective:   /70   Pulse 77   Temp 97.5  F (36.4  C)   Resp 18   Ht 1.575 m (5' 2\")   Wt 80.4 kg (177 lb 3.2 oz)   SpO2 92%   BMI 32.41 kg/m    GENERAL APPEARANCE:  Alert, in no distress, oriented, cooperative  RESP:  respiratory effort and palpation of chest normal, lungs clear to auscultation , no respiratory distress  CV:  regular rate and rhythm, no murmur, rub, or gallop, +3 LLE edema, +2 RLE edema.   ABDOMEN:  normal bowel sounds, soft, nontender, no guarding or rebound  M/S:   Gait and station abnormal, sitting in wheelchair.  SKIN:  Inspection of skin and subcutaneous tissue baseline, Palpation of skin and subcutaneous tissue baseline. Multiple bruises to face at end stages of healing. Multiple scabs to BLE. LUE bruising improving, wearing LUE sleeve.  NEURO:   Cranial nerves 2-12 are normal tested and grossly at patient's baseline, Examination of sensation by touch normal  PSYCH:  oriented X 3, memory impaired , affect and mood normal    Wt Readings from Last 4 Encounters:   03/11/24 80.4 kg (177 lb 3.2 oz)   03/08/24 80.6 kg (177 lb 9.6 oz)   03/04/24 78.1 kg (172 lb 3.2 oz)   02/28/24 78.8 kg (173 lb 11.2 oz)       Recent labs in [facility] reviewed by me today.   Most Recent 3 CBC's:  Recent Labs   Lab Test 03/05/24  0515 03/02/24  0607 02/29/24  1744 " 02/29/24  0614 02/28/24  1214   WBC  --  6.8  --  7.4 9.0   HGB 8.8* 9.0* 8.3* 8.5* 10.2*   MCV  --  96  --  94 90   PLT  --  422  --  337 384     Most Recent 3 BMP's:  Recent Labs   Lab Test 03/05/24  0515 03/02/24  0928 02/29/24  0614    136 132*   POTASSIUM 4.5 5.0 3.9   CHLORIDE 106 103 97*   CO2 26 23 24   BUN 11.6 15.4 17.2   CR 0.61* 0.60* 0.83   ANIONGAP 8 10 11   SHAUN 9.1 8.5* 8.1*   * 115* 85         Assessment/Plan:    (S32.010D) Compression fracture of L1 vertebra with routine healing, subsequent encounter  (primary encounter diagnosis)  (S40.012D) Traumatic hematoma of left shoulder, subsequent encounter  (G89.4) Chronic pain syndrome  Comment: L1 compression fracture and L shoulder hematoma r/t fall on 2/28/24. Hx recurrent falls. Chronic pain w/opiate dependence, s/p intrathecal pain pump. Pain uncontrolled.  PTA lives w/spouse. Ambulates 100ft w/walker. Requires CGA w/transfers.  Plan:   - Change dilaudid to 2-4mg every 4hrs PRN (2mg for pain 3-6/10; 4mg for pain 7-10/10) dx chronic back pain  - Change miralax to 17g po MWF dx constipation  - Continuous intrathecal pain pump; not using boluses at this time  - Patient to follow-up with [facility] on 3/18/24   - Patient to follow-up with Ortho as directed; prefers [facility]  - Continue PT, OT at [facility]  -  following for discharge planning.     (I50.9) Chronic congestive heart failure, unspecified heart failure type (H)  (I10) Essential hypertension, benign  Comment: Chronic CHF w/preserved EF. BP controlled. LLE venous US 3/8/24 negative for DVT.  Plan:   - Continue torsemide 20mg every day, started on 3/9/24   - Continue imdur, lipitor, metoprolol  - Monitor BP/HR, weights  - Therapy to eval/treat BLE edema       Orders:  - Change dilaudid to 2-4mg every 4hrs PRN (2mg for pain 3-6/10; 4mg for pain 7-10/10) dx chronic back pain  - Change miralax to 17g po MWF dx constipation  - Therapy to eval/treat BLE edema        Electronically signed by: FARZANA Mclaughlin CNP

## 2024-03-11 NOTE — PROGRESS NOTES
[facility] GERIATRICS    PRIMARY CARE PROVIDER AND CLINIC:  Jakob Conner MD, MD, NO INFO FOUND / [facility] [address]  Chief Complaint   Patient presents with    Hospital F/[facility] Medical Record Number:  [id number]  Place of Service where encounter took place:  [facility] ([facility]3][facility] ([facility])    Eren James  is a 78 year old  (1945), admitted to the above facility from  [facility]. Hospital stay 2/28/24 through 3/3/24..       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  He was noted to have multiple bruises and abrasions on his lower extremities.     Hx recurrent DVT on eliquis PTA .  This was dc'd in the hospital, recent LE venous US 2/9/24 negative for DVT. If DVT in the future, could consider IVC filter. Referral also placed for Neuropsychiatry testing for cognitive impairment evaluation. Pt has chronic pain with opiate dependence and polypharmacy concerns, PTA adderall was held upon discharge. He has a intrathecal pain pump in place, for which he receives boluses, as directed by pain clinic    Patient was hypotensive during hospital stay: PTA torsemide and losartan were dc'd as well.  Medical issues addressed during his hospitalization included lower extremity edema for which he had a negative venous Doppler while hospitalized as well as anemia with hemoglobin in the 8-9 range felt to be secondary to hematoma left shoulder.    Patient experienced ongoing generalized pain particular involving his mid back and left shoulder related to his recent fall.  He has been ambulating with  therapy with a walker.  He has been utilizing Dilaudid as needed.  He has noted increased bilateral lower extremity edema, left greater than right.  Prior to admission torsemide which was held during hospitalization, has been resumed.  He did have a repeat lower extremity Doppler on the left which was negative, on 3/8/2024.  He denies cough, chest pain, shortness of breath, nausea, vomiting.          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     See PSH    ASD (atrial septal defect)     Asthma     BPH (benign prostatic hyperplasia)     CHF (congestive heart failure)     Cholelithiasis     Chronic neck pain     COPD (chronic obstructive pulmonary disease) (H)     Coronary artery disease     Previous coronary angioplasties    Deep vein thrombosis     Depressive disorder     Diverticulitis of colon     Esophageal reflux     Gout     Hyperlipidemia     Hypertension     Hypertrophy of prostate with urinary obstruction and other lower urinary tract symptoms (LUTS)     Kidney stone     Subdural hematoma after fall     SVT (supraventricular tachycardia)     Testicular hypofunction       PAST SURGICAL HISTORY:   has a past surgical history that includes Colon surgery for diverticulitis (1970); Tonsillectomy, adenoidectomy, combined (1973); Coronary angiogram with angioplasty; 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  FAMILY HISTORY: family history includes Cardiovascular in his father and mother; Diabetes in his sister.  SOCIAL HISTORY:   reports that he has never smoked. He has never used smokeless tobacco. He  reports that he does not drink alcohol and does not use drugs.  Patient's living condition: lives with spouse    Current medications were reviewed by me today    Current Outpatient Medications   Medication Sig    acetaminophen (TYLENOL) 325 MG tablet Take 3 tablets (975 mg) by mouth every 8 hours as needed for mild pain    allopurinol (ZYLOPRIM) 300 MG tablet TAKE 1 TABLET(300 MG) BY MOUTH EVERY DAY Strength: 300 mg    atorvastatin (LIPITOR) 40 MG tablet Take 40 mg by mouth daily    B Complex CAPS Take 1 tablet by mouth daily    buPROPion (WELLBUTRIN XL) 150 MG 24 hr tablet Take 300 mg by mouth every morning    busPIRone (BUSPAR) 7.5 MG tablet Take 10 mg by mouth 3 times daily    chlorhexidine (PERIDEX) 0.12 % solution Swish and spit 15 mLs in mouth 2 times daily as needed    dicyclomine (BENTYL) 20 MG tablet Take 20 mg by mouth 3 times daily as needed    DULoxetine (CYMBALTA) 30 MG capsule Take 60 mg by mouth every morning    finasteride (PROSCAR) 5 MG tablet Take 1 tablet by mouth daily    FLOMAX 0.4 MG OR CP24 1 TABLET DAILY AFTER A MEAL    HYDROmorphone (DILAUDID) 2 MG tablet Take 1-2 tablets (2-4 mg) by mouth every 4 hours as needed for severe pain ((2mg for pain 3-6/10; 4mg for pain 7-10/10))    isosorbide mononitrate (IMDUR) 60 MG 24 hr tablet Take 90 mg by mouth daily    lidocaine (XYLOCAINE) 5 % external ointment APPLY TOPICALLY TO THE AFFECTED AREA THREE TIMES DAILY AS NEEDED    medication given by implanted intrathecal pump continuous Drug # 1: Bupivacaine (Marcaine)  - Conc:25 mg/mL - Total Dose / 24 hours: 17.52 mg    Drug # 2: Hydromorphone (Dilaudid)  - Conc:0.8 mg/mL - Total Dose / 24 hours: 0.56 mg    Drug # 3: Fentanyl (Sublimaze) - Conc: 1817.5 mcg/mL - Total Dose / 24 hours: 1273.9 mcg    Diluent: NS    May give boluses every 2 hours for up to 3 boluses per day of hydromorphone at 0.1982mg, fentanyl at 45 mcg and bupivacaine 0.619 mg in a 24 hour period   Pump Reservoir Volume: 40 mL  Outside  "Clinic & Provider: [facility]   Last Refill Date: 2/27/24  Next Refill Date: 4/16/2024  [place] Alarm Date: 1/20/2024  Pump : [employer]     Please consider consulting the pain team if the patient is admitted with an IT pump.    metoprolol succinate ER (TOPROL XL) 25 MG 24 hr tablet Take 1 tablet (25 mg) by mouth daily    MULTIVITAMINS OR TABS 1 tablet daily    NARCAN 4 MG/0.1ML nasal spray once as needed    nitroGLYcerin (NITROSTAT) 0.4 MG sublingual tablet DISSOLVE 1 TABLET UNDER THE TONGUE AS NEEDED FOR CHEST PAIN EVERY 5 MINUTES AS NEEDED AS DIRECTED    ondansetron (ZOFRAN ODT) 4 MG ODT tab DISSOLVE 1 TABLET(4 MG) ON THE TONGUE EVERY 8 HOURS AS NEEDED FOR NAUSEA OR VOMITING    polyethylene glycol (MIRALAX) 17 g packet Take 1 packet by mouth three times a week    senna-docusate (SENOKOT-S/PERICOLACE) 8.6-50 MG tablet Take 1 tablet by mouth 2 times daily as needed for constipation    torsemide (DEMADEX) 20 MG tablet Take 20 mg by mouth daily    traZODone (DESYREL) 50 MG tablet Take 75 mg by mouth at bedtime     No current facility-administered medications for this visit.       ROS:  10 point ROS of systems including Constitutional, Eyes, Respiratory, Cardiovascular, Gastroenterology, Genitourinary, Integumentary, Musculoskeletal, Psychiatric were all negative except for pertinent positives noted in my HPI.    Vitals:  /67   Pulse 98   Temp 97.9  F (36.6  C)   Resp 18   Ht 1.575 m (5' 2\")   Wt 80.4 kg (177 lb 3.2 oz)   SpO2 92%   BMI 32.41 kg/m    Exam:  Pale appearing very pleasant male who is notably ambulating with a walker in his room.  HEENT: Oral mucosa moist  Lungs clear  CV regular rhythm  Abdomen soft  Extremities: Swelling left shoulder with decreased range of motion left shoulder.  Extensive abrasions bruising both lower extremities.  Both legs are edematous, left greater than right.  Faint erythema left shin.    Lab/Diagnostic data:  Most Recent 3 CBC's:  Recent Labs "   Lab Test 03/12/24  0752 03/05/24  0515 03/02/24  0607 02/29/24  1744 02/29/24  0614 02/28/24  1214   WBC  --   --  6.8  --  7.4 9.0   HGB 10.9* 8.8* 9.0*   < > 8.5* 10.2*   MCV  --   --  96  --  94 90   PLT  --   --  422  --  337 384    < > = values in this interval not displayed.     Most Recent 3 BMP's:  Recent Labs   Lab Test 03/12/24  0752 03/05/24  0515 03/02/24  0928    140 136   POTASSIUM 3.5 4.5 5.0   CHLORIDE 102 106 103   CO2 30* 26 23   BUN 12.7 11.6 15.4   CR 0.70 0.61* 0.60*   ANIONGAP 12 8 10   SHAUN 9.1 9.1 8.5*   * 105* 115*       ASSESSMENT/PLAN:    Recent fall with compression fracture L1 vertebra, hematoma left shoulder  Chronic pain syndrome, managed with intrathecal pump  Plan: Pump management per pain clinic.  Continue as needed Dilaudid for now.  Therapies.   Monitor vitals, avoid hypotension  Assure safe discharge.    Bilateral lower extremity edema, left greater than right, 2 recent venous Dopplers unremarkable for DVT.  History of recurrent DVT with recent multiple negative Dopplers.  Prior to admission Eliquis discontinued secondary to recurrent falls  Multiple abrasions both legs secondary to recent falls  Plan: Lower extremity elevation.  Monitor for signs and symptoms of cellulitis and/or DVT.  Torsemide has been resumed, monitor exam, weight, BMP    Chronic congestive heart failure  Hypertension with hypotension during recent hospitalization, prompting discontinuation of losartan  Plan: Monitor exam, BMP.  Torsemide resumed in TCU as noted above    Anemia, acute on chronic.  Hemoglobin now stable  Plan: Iron studies ordered, pending        Cassius Peralta MD

## 2024-03-12 ENCOUNTER — TRANSITIONAL CARE UNIT VISIT (OUTPATIENT)
Dept: GERIATRICS | Facility: CLINIC | Age: 79
End: 2024-03-12
Payer: MEDICARE

## 2024-03-12 ENCOUNTER — TELEPHONE (OUTPATIENT)
Dept: GERIATRICS | Facility: CLINIC | Age: 79
End: 2024-03-12

## 2024-03-12 VITALS
HEART RATE: 98 BPM | DIASTOLIC BLOOD PRESSURE: 67 MMHG | RESPIRATION RATE: 18 BRPM | WEIGHT: 177.2 LBS | OXYGEN SATURATION: 92 % | BODY MASS INDEX: 32.61 KG/M2 | SYSTOLIC BLOOD PRESSURE: 106 MMHG | HEIGHT: 62 IN | TEMPERATURE: 97.9 F

## 2024-03-12 DIAGNOSIS — G89.4 CHRONIC PAIN SYNDROME: ICD-10-CM

## 2024-03-12 DIAGNOSIS — S40.012D TRAUMATIC HEMATOMA OF LEFT SHOULDER, SUBSEQUENT ENCOUNTER: ICD-10-CM

## 2024-03-12 DIAGNOSIS — I10 ESSENTIAL HYPERTENSION, BENIGN: ICD-10-CM

## 2024-03-12 DIAGNOSIS — S32.010D COMPRESSION FRACTURE OF L1 VERTEBRA WITH ROUTINE HEALING, SUBSEQUENT ENCOUNTER: Primary | ICD-10-CM

## 2024-03-12 LAB
ANION GAP SERPL CALCULATED.3IONS-SCNC: 12 MMOL/L (ref 7–15)
BUN SERPL-MCNC: 12.7 MG/DL (ref 8–23)
CALCIUM SERPL-MCNC: 9.1 MG/DL (ref 8.8–10.2)
CHLORIDE SERPL-SCNC: 102 MMOL/L (ref 98–107)
CREAT SERPL-MCNC: 0.7 MG/DL (ref 0.67–1.17)
DEPRECATED HCO3 PLAS-SCNC: 30 MMOL/L (ref 22–29)
EGFRCR SERPLBLD CKD-EPI 2021: >90 ML/MIN/1.73M2
FERRITIN SERPL-MCNC: 165 NG/ML (ref 31–409)
GLUCOSE SERPL-MCNC: 101 MG/DL (ref 70–99)
HGB BLD-MCNC: 10.9 G/DL (ref 13.3–17.7)
IRON BINDING CAPACITY (ROCHE): 273 UG/DL (ref 240–430)
IRON SATN MFR SERPL: 16 % (ref 15–46)
IRON SERPL-MCNC: 45 UG/DL (ref 61–157)
NT-PROBNP SERPL-MCNC: 936 PG/ML (ref 0–1800)
POTASSIUM SERPL-SCNC: 3.5 MMOL/L (ref 3.4–5.3)
SODIUM SERPL-SCNC: 144 MMOL/L (ref 135–145)

## 2024-03-12 PROCEDURE — 82728 ASSAY OF FERRITIN: CPT | Performed by: NURSE PRACTITIONER

## 2024-03-12 PROCEDURE — 83550 IRON BINDING TEST: CPT | Performed by: NURSE PRACTITIONER

## 2024-03-12 PROCEDURE — 85018 HEMOGLOBIN: CPT | Performed by: NURSE PRACTITIONER

## 2024-03-12 PROCEDURE — 99305 1ST NF CARE MODERATE MDM 35: CPT | Performed by: INTERNAL MEDICINE

## 2024-03-12 PROCEDURE — P9604 ONE-WAY ALLOW PRORATED TRIP: HCPCS | Performed by: NURSE PRACTITIONER

## 2024-03-12 PROCEDURE — 82565 ASSAY OF CREATININE: CPT | Performed by: NURSE PRACTITIONER

## 2024-03-12 PROCEDURE — 83540 ASSAY OF IRON: CPT | Performed by: NURSE PRACTITIONER

## 2024-03-12 PROCEDURE — 83880 ASSAY OF NATRIURETIC PEPTIDE: CPT | Performed by: NURSE PRACTITIONER

## 2024-03-12 NOTE — LETTER
3/12/2024        RE: Eren James  [address] [place] [facility] [address]        [facility] GERIATRICS    PRIMARY CARE PROVIDER AND CLINIC:  Jakob Conner MD, MD, NO INFO FOUND / [place] [address]  Chief Complaint   Patient presents with     Hospital F/U      [facility] Medical Record Number:  [id number]  Place of Service where encounter took place:  [facility] (TCU)    Eren James  is a 78 year old  (1945), admitted to the above facility from  [facility]. Hospital stay 2/28/24 through 3/3/24..       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  He was noted to have multiple bruises and abrasions on his lower extremities.     Hx recurrent DVT on eliquis PTA .  This was dc'd in the hospital, recent LE venous US 2/9/24 negative for DVT. If DVT in the future, could consider IVC filter. Referral also placed for Neuropsychiatry testing for cognitive impairment evaluation. Pt has chronic pain with opiate dependence and polypharmacy concerns, PTA adderall was held upon discharge. He has a intrathecal pain pump in place, for which he receives boluses, as directed by pain clinic    Patient was hypotensive during hospital stay: PTA torsemide and losartan were dc'd as well.  Medical issues addressed during his hospitalization included lower extremity edema for which he had a negative venous Doppler while hospitalized as well as anemia with hemoglobin in the 8-9 range felt to be secondary to hematoma left shoulder.    Patient experienced ongoing generalized pain particular  involving his mid back and left shoulder related to his recent fall.  He has been ambulating with therapy with a walker.  He has been utilizing Dilaudid as needed.  He has noted increased bilateral lower extremity edema, left greater than right.  Prior to admission torsemide which was held during hospitalization, has been resumed.  He did have a repeat lower extremity Doppler on the left which was negative, on 3/8/2024.  He denies cough, chest pain, shortness of breath, nausea, vomiting.          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     See PSH     ASD (atrial septal defect)      Asthma      BPH (benign prostatic hyperplasia)      CHF (congestive heart failure)      Cholelithiasis      Chronic neck pain      COPD (chronic obstructive pulmonary disease) (H)      Coronary artery disease     Previous coronary angioplasties     Deep vein thrombosis      Depressive disorder      Diverticulitis of colon      Esophageal reflux      Gout      Hyperlipidemia      Hypertension      Hypertrophy of prostate with urinary obstruction and other lower urinary tract symptoms (LUTS)      Kidney stone      Subdural hematoma after fall      SVT (supraventricular tachycardia)      Testicular hypofunction       PAST SURGICAL HISTORY:   has a past surgical history that includes Colon surgery for diverticulitis (1970); Tonsillectomy, adenoidectomy, combined (1973); Coronary angiogram with angioplasty; 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  FAMILY HISTORY: family history includes Cardiovascular in his father and  mother; Diabetes in his sister.  SOCIAL HISTORY:   reports that he has never smoked. He has never used smokeless tobacco. He reports that he does not drink alcohol and does not use drugs.  Patient's living condition: lives with spouse    Current medications were reviewed by me today    Current Outpatient Medications   Medication Sig     acetaminophen (TYLENOL) 325 MG tablet Take 3 tablets (975 mg) by mouth every 8 hours as needed for mild pain     allopurinol (ZYLOPRIM) 300 MG tablet TAKE 1 TABLET(300 MG) BY MOUTH EVERY DAY Strength: 300 mg     atorvastatin (LIPITOR) 40 MG tablet Take 40 mg by mouth daily     B Complex CAPS Take 1 tablet by mouth daily     buPROPion (WELLBUTRIN XL) 150 MG 24 hr tablet Take 300 mg by mouth every morning     busPIRone (BUSPAR) 7.5 MG tablet Take 10 mg by mouth 3 times daily     chlorhexidine (PERIDEX) 0.12 % solution Swish and spit 15 mLs in mouth 2 times daily as needed     dicyclomine (BENTYL) 20 MG tablet Take 20 mg by mouth 3 times daily as needed     DULoxetine (CYMBALTA) 30 MG capsule Take 60 mg by mouth every morning     finasteride (PROSCAR) 5 MG tablet Take 1 tablet by mouth daily     FLOMAX 0.4 MG OR CP24 1 TABLET DAILY AFTER A MEAL     HYDROmorphone (DILAUDID) 2 MG tablet Take 1-2 tablets (2-4 mg) by mouth every 4 hours as needed for severe pain ((2mg for pain 3-6/10; 4mg for pain 7-10/10))     isosorbide mononitrate (IMDUR) 60 MG 24 hr tablet Take 90 mg by mouth daily     lidocaine (XYLOCAINE) 5 % external ointment APPLY TOPICALLY TO THE AFFECTED AREA THREE TIMES DAILY AS NEEDED     medication given by implanted intrathecal pump continuous Drug # 1: Bupivacaine (Marcaine)  - Conc:25 mg/mL - Total Dose / 24 hours: 17.52 mg    Drug # 2: Hydromorphone (Dilaudid)  - Conc:0.8 mg/mL - Total Dose / 24 hours: 0.56 mg    Drug # 3: Fentanyl (Sublimaze) - Conc: 1817.5 mcg/mL - Total Dose / 24 hours: 1273.9 mcg    Diluent: NS    May give boluses every 2 hours for up to 3 boluses per  "day of hydromorphone at 0.1982mg, fentanyl at 45 mcg and bupivacaine 0.619 mg in a 24 hour period   Pump Reservoir Volume: 40 mL  Outside Clinic & Provider: [facility]   Last Refill Date: 2/27/24  Next Refill Date: 4/16/2024  [place] Alarm Date: 1/20/2024  Pump : [employer]     Please consider consulting the pain team if the patient is admitted with an IT pump.     metoprolol succinate ER (TOPROL XL) 25 MG 24 hr tablet Take 1 tablet (25 mg) by mouth daily     MULTIVITAMINS OR TABS 1 tablet daily     NARCAN 4 MG/0.1ML nasal spray once as needed     nitroGLYcerin (NITROSTAT) 0.4 MG sublingual tablet DISSOLVE 1 TABLET UNDER THE TONGUE AS NEEDED FOR CHEST PAIN EVERY 5 MINUTES AS NEEDED AS DIRECTED     ondansetron (ZOFRAN ODT) 4 MG ODT tab DISSOLVE 1 TABLET(4 MG) ON THE TONGUE EVERY 8 HOURS AS NEEDED FOR NAUSEA OR VOMITING     polyethylene glycol (MIRALAX) 17 g packet Take 1 packet by mouth three times a week     senna-docusate (SENOKOT-S/PERICOLACE) 8.6-50 MG tablet Take 1 tablet by mouth 2 times daily as needed for constipation     torsemide (DEMADEX) 20 MG tablet Take 20 mg by mouth daily     traZODone (DESYREL) 50 MG tablet Take 75 mg by mouth at bedtime     No current facility-administered medications for this visit.       ROS:  10 point ROS of systems including Constitutional, Eyes, Respiratory, Cardiovascular, Gastroenterology, Genitourinary, Integumentary, Musculoskeletal, Psychiatric were all negative except for pertinent positives noted in my HPI.    Vitals:  /67   Pulse 98   Temp 97.9  F (36.6  C)   Resp 18   Ht 1.575 m (5' 2\")   Wt 80.4 kg (177 lb 3.2 oz)   SpO2 92%   BMI 32.41 kg/m    Exam:  Pale appearing very pleasant male who is notably ambulating with a walker in his room.  HEENT: Oral mucosa moist  Lungs clear  CV regular rhythm  Abdomen soft  Extremities: Swelling left shoulder with decreased range of motion left shoulder.  Extensive abrasions bruising both lower " extremities.  Both legs are edematous, left greater than right.  Faint erythema left shin.    Lab/Diagnostic data:  Most Recent 3 CBC's:  Recent Labs   Lab Test 03/12/24  0752 03/05/24  0515 03/02/24  0607 02/29/24  1744 02/29/24  0614 02/28/24  1214   WBC  --   --  6.8  --  7.4 9.0   HGB 10.9* 8.8* 9.0*   < > 8.5* 10.2*   MCV  --   --  96  --  94 90   PLT  --   --  422  --  337 384    < > = values in this interval not displayed.     Most Recent 3 BMP's:  Recent Labs   Lab Test 03/12/24  0752 03/05/24  0515 03/02/24  0928    140 136   POTASSIUM 3.5 4.5 5.0   CHLORIDE 102 106 103   CO2 30* 26 23   BUN 12.7 11.6 15.4   CR 0.70 0.61* 0.60*   ANIONGAP 12 8 10   SHAUN 9.1 9.1 8.5*   * 105* 115*       ASSESSMENT/PLAN:    Recent fall with compression fracture L1 vertebra, hematoma left shoulder  Chronic pain syndrome, managed with intrathecal pump  Plan: Pump management per pain clinic.  Continue as needed Dilaudid for now.  Therapies.   Monitor vitals, avoid hypotension  Assure safe discharge.    Bilateral lower extremity edema, left greater than right, 2 recent venous Dopplers unremarkable for DVT.  History of recurrent DVT with recent multiple negative Dopplers.  Prior to admission Eliquis discontinued secondary to recurrent falls  Multiple abrasions both legs secondary to recent falls  Plan: Lower extremity elevation.  Monitor for signs and symptoms of cellulitis and/or DVT.  Torsemide has been resumed, monitor exam, weight, BMP    Chronic congestive heart failure  Hypertension with hypotension during recent hospitalization, prompting discontinuation of losartan  Plan: Monitor exam, BMP.  Torsemide resumed in TCU as noted above    Anemia, acute on chronic.  Hemoglobin now stable  Plan: Iron studies ordered, pending        Cassius Peralta MD      Sincerely,        Cassius Peralta MD

## 2024-03-12 NOTE — TELEPHONE ENCOUNTER
[facility] Geriatrics Triage Nurse Telephone Encounter    Provider: FARZANA Bethea CNP   Facility: [facility] Type:  TCU    Caller: Milo  Call Back Number: [phone]    Allergies:    Allergies   Allergen Reactions    Armodafinil Other (See Comments)        Reason for call: Pt's LLE is swollen on the calf and shin. Shin appears swollen, tender and warm to touch. The calf area is swollen, shiny and weeping clear fluids. Wt on 3/3 was 172 and yesterday wt was 177. Currently on Torsemide 20mg daily.     Verbal Order/Direction given by Provider:   - Doxycycline 100mg PO BID for 7 days  Dx: LLE cellulitis    Provider giving Order:  FARZANA Bethea CNP     Verbal Order given to: Milo Terry RN

## 2024-03-14 ENCOUNTER — TRANSITIONAL CARE UNIT VISIT (OUTPATIENT)
Dept: GERIATRICS | Facility: CLINIC | Age: 79
End: 2024-03-14
Payer: MEDICARE

## 2024-03-14 VITALS
WEIGHT: 175 LBS | TEMPERATURE: 97 F | HEIGHT: 62 IN | OXYGEN SATURATION: 94 % | DIASTOLIC BLOOD PRESSURE: 77 MMHG | BODY MASS INDEX: 32.2 KG/M2 | SYSTOLIC BLOOD PRESSURE: 123 MMHG | HEART RATE: 71 BPM | RESPIRATION RATE: 18 BRPM

## 2024-03-14 DIAGNOSIS — D64.9 ANEMIA, UNSPECIFIED TYPE: ICD-10-CM

## 2024-03-14 DIAGNOSIS — I50.9 CHRONIC CONGESTIVE HEART FAILURE, UNSPECIFIED HEART FAILURE TYPE (H): ICD-10-CM

## 2024-03-14 DIAGNOSIS — F03.A0 MILD DEMENTIA WITHOUT BEHAVIORAL DISTURBANCE, PSYCHOTIC DISTURBANCE, MOOD DISTURBANCE, OR ANXIETY, UNSPECIFIED DEMENTIA TYPE (H): ICD-10-CM

## 2024-03-14 DIAGNOSIS — L03.116 CELLULITIS OF LEFT LOWER EXTREMITY: Primary | ICD-10-CM

## 2024-03-14 DIAGNOSIS — R60.0 BILATERAL LOWER EXTREMITY EDEMA: ICD-10-CM

## 2024-03-14 DIAGNOSIS — R53.81 PHYSICAL DECONDITIONING: ICD-10-CM

## 2024-03-14 PROCEDURE — 99309 SBSQ NF CARE MODERATE MDM 30: CPT | Performed by: NURSE PRACTITIONER

## 2024-03-14 NOTE — PROGRESS NOTES
"[facility] GERIATRICS    Chief Complaint   Patient presents with    RECHECK     HPI:  Eren James is a 78 year old  (1945), who is being seen today for an episodic care visit at: [facility] ([facility]) [[id number]].       Today's concern is:     During exam, patient seen sitting in wheelchair. Reports doing well. Endorses pain and swelling to LLE with increased redness over the past few days. Ambulates w/walker. Admits to good appetite. Sleeping well at night. Denies fever or chills. Denies chest pain, SOB, headache, syncope.      Allergies, and PMH/PSH reviewed in [facility] today.    REVIEW OF SYSTEMS:  4 point ROS including Respiratory, CV, GI and , other than that noted in the HPI,  is negative      Objective:   /77   Pulse 71   Temp 97  F (36.1  C)   Resp 18   Ht 1.575 m (5' 2\")   Wt 79.4 kg (175 lb)   SpO2 94%   BMI 32.01 kg/m    GENERAL APPEARANCE:  Alert, in no distress, oriented, cooperative  RESP:  respiratory effort and palpation of chest normal, lungs clear to auscultation , no respiratory distress  CV:  regular rate and rhythm, no murmur, rub, or gallop, +3 LLE edema, +2 RLE edema.   ABDOMEN:  normal bowel sounds, soft, nontender, no guarding or rebound  M/S:   Gait and station abnormal, sitting in wheelchair.  SKIN:  Inspection of skin and subcutaneous tissue baseline, Palpation of skin and subcutaneous tissue baseline. Multiple bruises to face at end stages of healing. Multiple scabs to BLE. LUE bruising improving, wearing LUE sleeve. LLE cellulitis.  NEURO:   Cranial nerves 2-12 are normal tested and grossly at patient's baseline, Examination of sensation by touch normal  PSYCH:  oriented X 3, memory impaired , affect and mood normal      Recent labs in [facility] reviewed by me today.    Most Recent 3 CBC's:  Recent Labs   Lab Test 03/18/24  0536 03/12/24  0752 03/05/24  0515 03/02/24  0607 02/29/24  1744 02/29/24  0614   WBC 6.8  --   --  6.8  --  7.4   HGB 9.6* " 10.9* 8.8* 9.0*   < > 8.5*   MCV 95  --   --  96  --  94     --   --  422  --  337    < > = values in this interval not displayed.     Most Recent 3 BMP's:  Recent Labs   Lab Test 03/18/24  0536 03/12/24  0752 03/05/24  0515    144 140   POTASSIUM 2.8* 3.5 4.5   CHLORIDE 99 102 106   CO2 30* 30* 26   BUN 15.6 12.7 11.6   CR 0.67 0.70 0.61*   ANIONGAP 13 12 8   SHAUN 8.8 9.1 9.1   * 101* 105*     Ferritin   Date Value Ref Range Status   03/12/2024 165 31 - 409 ng/mL Final     Iron   Date Value Ref Range Status   03/12/2024 45 (L) 61 - 157 ug/dL Final     Iron Binding Capacity   Date Value Ref Range Status   03/12/2024 273 240 - 430 ug/dL Final         Assessment/Plan:    (L03.116) Cellulitis of left lower extremity  (primary encounter diagnosis)  Comment: Acute LLE cellulitis  Plan:   - Check CBC, BMP on 3/18/24 dx edema, anemia, LLE cellulitis  - Continue course of doxycycline x 7 days    (I50.9) Chronic congestive heart failure, unspecified heart failure type (H)  (R60.0) Bilateral lower extremity edema  Comment: Chronic CHF w/BLE edema (LLE > RLE) with preserved EF.   Plan:   - Continue torsemide  - Continue imdur, lipitor, metoprolol  - Monitor BP/HR, weights  - Therapy to amador/treat edema    (F03.A0) Mild dementia without behavioral disturbance, psychotic disturbance, mood disturbance, or anxiety, unspecified dementia type (H)  (R53.81) Physical deconditioning  Comment: Chronic moderate dementia, SLUMS 13/30; repeat SLUMS 19/30 on 3/14/24. Poor historian. Ongoing physical deconditioning, ambulates w/walker.   Last therapy update 3/13/24:   Transfers SBA, Independent with Bed mobility, ambulating SBA 150ft, SLUMS 13/30, therapy plans to re-assess cognitive testing, CGA with LE dressing and toileting.  Plan:   - Continue PT, OT at [facility]  -  following for discharge planning. Unclear discharge plan; spouse considering alternative placement, patient's goal is to discharge home.    (D64.9) Anemia,  unspecified type  Comment: Acute anemia r/t L shoulder hematoma/fall on 2/28/24.  Plan:   - Check CBC      Orders:  - Check CBC, BMP on 3/18/24 dx edema, anemia, LLE cellulitis        Electronically signed by: FARZANA Mclaughlin CNP

## 2024-03-15 ENCOUNTER — LAB REQUISITION (OUTPATIENT)
Dept: LAB | Facility: CLINIC | Age: 79
End: 2024-03-15
Payer: MEDICARE

## 2024-03-15 DIAGNOSIS — D64.9 ANEMIA, UNSPECIFIED: ICD-10-CM

## 2024-03-18 ENCOUNTER — LAB REQUISITION (OUTPATIENT)
Dept: LAB | Facility: CLINIC | Age: 79
End: 2024-03-18
Payer: MEDICARE

## 2024-03-18 ENCOUNTER — TRANSITIONAL CARE UNIT VISIT (OUTPATIENT)
Dept: GERIATRICS | Facility: CLINIC | Age: 79
End: 2024-03-18
Payer: MEDICARE

## 2024-03-18 VITALS
OXYGEN SATURATION: 96 % | HEIGHT: 62 IN | SYSTOLIC BLOOD PRESSURE: 145 MMHG | HEART RATE: 61 BPM | BODY MASS INDEX: 31.73 KG/M2 | RESPIRATION RATE: 18 BRPM | TEMPERATURE: 98.1 F | DIASTOLIC BLOOD PRESSURE: 78 MMHG | WEIGHT: 172.4 LBS

## 2024-03-18 DIAGNOSIS — F03.A0 MILD DEMENTIA WITHOUT BEHAVIORAL DISTURBANCE, PSYCHOTIC DISTURBANCE, MOOD DISTURBANCE, OR ANXIETY, UNSPECIFIED DEMENTIA TYPE (H): ICD-10-CM

## 2024-03-18 DIAGNOSIS — I50.9 CHRONIC CONGESTIVE HEART FAILURE, UNSPECIFIED HEART FAILURE TYPE (H): ICD-10-CM

## 2024-03-18 DIAGNOSIS — E87.6 HYPOKALEMIA: ICD-10-CM

## 2024-03-18 DIAGNOSIS — S32.010D COMPRESSION FRACTURE OF L1 VERTEBRA WITH ROUTINE HEALING, SUBSEQUENT ENCOUNTER: ICD-10-CM

## 2024-03-18 DIAGNOSIS — G89.4 CHRONIC PAIN SYNDROME: ICD-10-CM

## 2024-03-18 DIAGNOSIS — R53.81 PHYSICAL DECONDITIONING: ICD-10-CM

## 2024-03-18 DIAGNOSIS — L03.116 CELLULITIS OF LEFT LOWER EXTREMITY: Primary | ICD-10-CM

## 2024-03-18 DIAGNOSIS — S40.012D TRAUMATIC HEMATOMA OF LEFT SHOULDER, SUBSEQUENT ENCOUNTER: ICD-10-CM

## 2024-03-18 LAB
ANION GAP SERPL CALCULATED.3IONS-SCNC: 13 MMOL/L (ref 7–15)
BUN SERPL-MCNC: 15.6 MG/DL (ref 8–23)
CALCIUM SERPL-MCNC: 8.8 MG/DL (ref 8.8–10.2)
CHLORIDE SERPL-SCNC: 99 MMOL/L (ref 98–107)
CREAT SERPL-MCNC: 0.67 MG/DL (ref 0.67–1.17)
DEPRECATED HCO3 PLAS-SCNC: 30 MMOL/L (ref 22–29)
EGFRCR SERPLBLD CKD-EPI 2021: >90 ML/MIN/1.73M2
ERYTHROCYTE [DISTWIDTH] IN BLOOD BY AUTOMATED COUNT: 14.3 % (ref 10–15)
GLUCOSE SERPL-MCNC: 149 MG/DL (ref 70–99)
HCT VFR BLD AUTO: 30.8 % (ref 40–53)
HGB BLD-MCNC: 9.6 G/DL (ref 13.3–17.7)
MCH RBC QN AUTO: 29.7 PG (ref 26.5–33)
MCHC RBC AUTO-ENTMCNC: 31.2 G/DL (ref 31.5–36.5)
MCV RBC AUTO: 95 FL (ref 78–100)
PLATELET # BLD AUTO: 367 10E3/UL (ref 150–450)
POTASSIUM SERPL-SCNC: 2.8 MMOL/L (ref 3.4–5.3)
RBC # BLD AUTO: 3.23 10E6/UL (ref 4.4–5.9)
SODIUM SERPL-SCNC: 142 MMOL/L (ref 135–145)
WBC # BLD AUTO: 6.8 10E3/UL (ref 4–11)

## 2024-03-18 PROCEDURE — 85027 COMPLETE CBC AUTOMATED: CPT | Performed by: NURSE PRACTITIONER

## 2024-03-18 PROCEDURE — 80048 BASIC METABOLIC PNL TOTAL CA: CPT | Performed by: NURSE PRACTITIONER

## 2024-03-18 PROCEDURE — 99310 SBSQ NF CARE HIGH MDM 45: CPT | Performed by: NURSE PRACTITIONER

## 2024-03-18 PROCEDURE — P9604 ONE-WAY ALLOW PRORATED TRIP: HCPCS | Performed by: NURSE PRACTITIONER

## 2024-03-18 NOTE — PROGRESS NOTES
"[facility] GERIATRICS    Chief Complaint   Patient presents with    RECHECK     HPI:  Eren James is a 78 year old  (1945), who is being seen today for an episodic care visit at: [facility] ([facility]) [[id number]].       Today's concern is:     During exam, patient seen resting in bed. Reports doing ok. Has been participating in therapy, ambulates w/walker and progressing well. Goal is to discharge home. States pain controlled, plans to follow-up with pain clinic. States doesn't want to go to [facility] in [place]; prefers to be seen by [facility] team. States pain to L shoulder improving, swelling significantly improved. Denies chest pain, SOB, headache, syncope.      Allergies, and PMH/PSH reviewed in [facility] today.    REVIEW OF SYSTEMS:  4 point ROS including Respiratory, CV, GI and , other than that noted in the HPI,  is negative    Objective:   BP (!) 145/78   Pulse 61   Temp 98.1  F (36.7  C)   Resp 18   Ht 1.575 m (5' 2\")   Wt 78.2 kg (172 lb 6.4 oz)   SpO2 96%   BMI 31.53 kg/m    GENERAL APPEARANCE:  Alert, in no distress, oriented, cooperative  RESP:  respiratory effort and palpation of chest normal, lungs clear to auscultation , no respiratory distress  CV:  regular rate and rhythm, no murmur, rub, or gallop, +2 BLE edema.  ABDOMEN:  normal bowel sounds, soft, nontender, no guarding or rebound  M/S:   Gait and station abnormal, sitting in wheelchair.  SKIN:  Inspection of skin and subcutaneous tissue baseline, Palpation of skin and subcutaneous tissue baseline. Multiple bruises to face at end stages of healing. Multiple scabs to BLE. LUE bruising improving. LLE cellulitis, improving.  NEURO:   Cranial nerves 2-12 are normal tested and grossly at patient's baseline, Examination of sensation by touch normal  PSYCH:  oriented X 3, memory impaired , affect and mood normal    Wt Readings from Last 4 Encounters:   03/18/24 78.2 kg (172 lb 6.4 oz)   03/14/24 79.4 kg (175 lb) "   03/12/24 80.4 kg (177 lb 3.2 oz)   03/11/24 80.4 kg (177 lb 3.2 oz)       Recent labs in [facility] reviewed by me today.   Most Recent 3 CBC's:  Recent Labs   Lab Test 03/18/24  0536 03/12/24  0752 03/05/24  0515 03/02/24  0607 02/29/24  1744 02/29/24  0614   WBC 6.8  --   --  6.8  --  7.4   HGB 9.6* 10.9* 8.8* 9.0*   < > 8.5*   MCV 95  --   --  96  --  94     --   --  422  --  337    < > = values in this interval not displayed.     Most Recent 3 BMP's:  Recent Labs   Lab Test 03/18/24  0536 03/12/24  0752 03/05/24  0515    144 140   POTASSIUM 2.8* 3.5 4.5   CHLORIDE 99 102 106   CO2 30* 30* 26   BUN 15.6 12.7 11.6   CR 0.67 0.70 0.61*   ANIONGAP 13 12 8   SHAUN 8.8 9.1 9.1   * 101* 105*         Assessment/Plan:    (L03.116) Cellulitis of left lower extremity  (primary encounter diagnosis)  Comment: Cellulitis of LLE improving.   Plan:   - Continue course of doxycycline, to be completed on 3/19/24    (I50.9) Chronic congestive heart failure, unspecified heart failure type (H)  (E87.6) Hypokalemia  Comment: Chronic CHF w/preserved EF. Acute hypokalemia.   Plan:   - Add potassium chloride 40meq now and give additional potassium chloride 40meq in 4hrs dx hypokalemia  - Recheck K+ level on 3/19/24 dx hypokalemia   - Continue torsemide  - Continue imdur, lipitor, metoprolol  - Monitor BP/HR, weights  - Therapy to amador/treat edema    (F03.A0) Mild dementia without behavioral disturbance, psychotic disturbance, mood disturbance, or anxiety, unspecified dementia type (H)  (R53.81) Physical deconditioning  (S32.010D) Compression fracture of L1 vertebra with routine healing, subsequent encounter  (S40.012D) Traumatic hematoma of left shoulder, subsequent encounter  (G89.4) Chronic pain syndrome  Comment: Chronic mild dementia, SLUMS 19/30. Ongoing physical deconditioning, ambulates w/walker. Ambulates 150ft w/walker.   Plan:   - Continue oxycodone PRN  - Continue PT, OT at [facility]  - SW following for discharge  planning.  - Patient has follow-up appointment scheduled with Dr. Mendes ([facility]) on 3/20/24  - Patient to follow-up with [facility] as directed  - Reviewed upcoming appointments with [facility]; reports patient and spouse aware of above appointments      Orders:  - Add potassium chloride 40meq now and give additional potassium chloride 40meq in 4hrs dx hypokalemia  - Recheck K+ level on 3/19/24 dx hypokalemia         Total time spent during today's visit was 45 mins including patient visit (25 minutes) and review of past records (15 minutes). Time also spent coordinating care with [facility] regarding patient's appointments (5 minutes).     Electronically signed by: FARZANA Mclaughlin CNP

## 2024-03-19 LAB — POTASSIUM SERPL-SCNC: 3.7 MMOL/L (ref 3.4–5.3)

## 2024-03-19 PROCEDURE — 84132 ASSAY OF SERUM POTASSIUM: CPT | Performed by: NURSE PRACTITIONER

## 2024-03-19 PROCEDURE — P9604 ONE-WAY ALLOW PRORATED TRIP: HCPCS | Performed by: NURSE PRACTITIONER

## 2024-03-21 ENCOUNTER — DISCHARGE SUMMARY NURSING HOME (OUTPATIENT)
Dept: GERIATRICS | Facility: CLINIC | Age: 79
End: 2024-03-21
Payer: MEDICARE

## 2024-03-21 VITALS
DIASTOLIC BLOOD PRESSURE: 66 MMHG | SYSTOLIC BLOOD PRESSURE: 124 MMHG | WEIGHT: 171.2 LBS | RESPIRATION RATE: 18 BRPM | OXYGEN SATURATION: 93 % | HEIGHT: 62 IN | HEART RATE: 58 BPM | TEMPERATURE: 97.8 F | BODY MASS INDEX: 31.5 KG/M2

## 2024-03-21 DIAGNOSIS — I50.9 CHRONIC CONGESTIVE HEART FAILURE, UNSPECIFIED HEART FAILURE TYPE (H): ICD-10-CM

## 2024-03-21 DIAGNOSIS — F32.A ANXIETY AND DEPRESSION: ICD-10-CM

## 2024-03-21 DIAGNOSIS — I10 ESSENTIAL HYPERTENSION, BENIGN: ICD-10-CM

## 2024-03-21 DIAGNOSIS — S40.012D TRAUMATIC HEMATOMA OF LEFT SHOULDER, SUBSEQUENT ENCOUNTER: ICD-10-CM

## 2024-03-21 DIAGNOSIS — L03.116 CELLULITIS OF LEFT LOWER EXTREMITY: ICD-10-CM

## 2024-03-21 DIAGNOSIS — R41.89 COGNITIVE IMPAIRMENT: ICD-10-CM

## 2024-03-21 DIAGNOSIS — G89.4 CHRONIC PAIN SYNDROME: ICD-10-CM

## 2024-03-21 DIAGNOSIS — N40.1 BENIGN PROSTATIC HYPERPLASIA WITH NOCTURIA: ICD-10-CM

## 2024-03-21 DIAGNOSIS — I82.409 RECURRENT DEEP VEIN THROMBOSIS (DVT) (H): ICD-10-CM

## 2024-03-21 DIAGNOSIS — F41.9 ANXIETY AND DEPRESSION: ICD-10-CM

## 2024-03-21 DIAGNOSIS — Z87.39 HX OF GOUT: ICD-10-CM

## 2024-03-21 DIAGNOSIS — S32.010D COMPRESSION FRACTURE OF L1 VERTEBRA WITH ROUTINE HEALING, SUBSEQUENT ENCOUNTER: Primary | ICD-10-CM

## 2024-03-21 DIAGNOSIS — I47.10 PAROXYSMAL SUPRAVENTRICULAR TACHYCARDIA (H): ICD-10-CM

## 2024-03-21 DIAGNOSIS — R35.1 BENIGN PROSTATIC HYPERPLASIA WITH NOCTURIA: ICD-10-CM

## 2024-03-21 PROCEDURE — 99316 NF DSCHRG MGMT 30 MIN+: CPT | Performed by: NURSE PRACTITIONER

## 2024-03-21 RX ORDER — BUSPIRONE HYDROCHLORIDE 10 MG/1
10 TABLET ORAL 3 TIMES DAILY
Qty: 90 TABLET | Refills: 0 | Status: SHIPPED | OUTPATIENT
Start: 2024-03-21

## 2024-03-21 RX ORDER — ATORVASTATIN CALCIUM 40 MG/1
40 TABLET, FILM COATED ORAL DAILY
Qty: 30 TABLET | Refills: 0 | Status: SHIPPED | OUTPATIENT
Start: 2024-03-21

## 2024-03-21 RX ORDER — ISOSORBIDE MONONITRATE 30 MG/1
90 TABLET, EXTENDED RELEASE ORAL DAILY
Qty: 90 TABLET | Refills: 0 | Status: SHIPPED | OUTPATIENT
Start: 2024-03-21

## 2024-03-21 RX ORDER — BUPROPION HYDROCHLORIDE 150 MG/1
300 TABLET ORAL EVERY MORNING
Qty: 30 TABLET | Refills: 0 | Status: SHIPPED | OUTPATIENT
Start: 2024-03-21

## 2024-03-21 RX ORDER — POLYETHYLENE GLYCOL 3350 17 G/17G
1 POWDER, FOR SOLUTION ORAL DAILY PRN
Qty: 30 PACKET | Refills: 0 | Status: SHIPPED | OUTPATIENT
Start: 2024-03-21

## 2024-03-21 RX ORDER — LIDOCAINE 50 MG/G
OINTMENT TOPICAL
Qty: 240 G | Refills: 0 | Status: SHIPPED | OUTPATIENT
Start: 2024-03-21

## 2024-03-21 RX ORDER — TRAZODONE HYDROCHLORIDE 50 MG/1
75 TABLET, FILM COATED ORAL AT BEDTIME
Qty: 30 TABLET | Refills: 0 | Status: SHIPPED | OUTPATIENT
Start: 2024-03-21

## 2024-03-21 RX ORDER — TAMSULOSIN HYDROCHLORIDE 0.4 MG/1
0.4 CAPSULE ORAL EVERY EVENING
Qty: 30 CAPSULE | Refills: 0 | Status: SHIPPED | OUTPATIENT
Start: 2024-03-21

## 2024-03-21 RX ORDER — NITROGLYCERIN 0.4 MG/1
TABLET SUBLINGUAL
Qty: 30 TABLET | Refills: 0 | Status: SHIPPED | OUTPATIENT
Start: 2024-03-21

## 2024-03-21 RX ORDER — ONDANSETRON 4 MG/1
TABLET, ORALLY DISINTEGRATING ORAL
Qty: 10 TABLET | Refills: 0 | Status: SHIPPED | OUTPATIENT
Start: 2024-03-21

## 2024-03-21 RX ORDER — FINASTERIDE 5 MG/1
1 TABLET, FILM COATED ORAL DAILY
Qty: 30 TABLET | Refills: 0 | Status: SHIPPED | OUTPATIENT
Start: 2024-03-21

## 2024-03-21 RX ORDER — METOPROLOL SUCCINATE 25 MG/1
25 TABLET, EXTENDED RELEASE ORAL DAILY
Qty: 30 TABLET | Refills: 0 | Status: SHIPPED | OUTPATIENT
Start: 2024-03-21

## 2024-03-21 RX ORDER — DULOXETIN HYDROCHLORIDE 30 MG/1
60 CAPSULE, DELAYED RELEASE ORAL EVERY MORNING
Qty: 60 CAPSULE | Refills: 0 | Status: SHIPPED | OUTPATIENT
Start: 2024-03-21

## 2024-03-21 RX ORDER — ALLOPURINOL 300 MG/1
TABLET ORAL
Qty: 30 TABLET | Refills: 0 | Status: SHIPPED | OUTPATIENT
Start: 2024-03-21

## 2024-03-21 RX ORDER — TORSEMIDE 20 MG/1
20 TABLET ORAL DAILY
Qty: 30 TABLET | Refills: 0 | Status: SHIPPED | OUTPATIENT
Start: 2024-03-21

## 2024-03-21 RX ORDER — BUSPIRONE HYDROCHLORIDE 10 MG/1
10 TABLET ORAL 3 TIMES DAILY
COMMUNITY
Start: 2024-03-21 | End: 2024-03-21

## 2024-03-21 RX ORDER — NALOXONE HYDROCHLORIDE 4 MG/.1ML
4 SPRAY NASAL
Qty: 0.2 ML | Refills: 1 | Status: SHIPPED | OUTPATIENT
Start: 2024-03-21

## 2024-03-21 NOTE — LETTER
3/21/2024        RE: Eren James  [address] [place]  [place] [address]        [facility] GERIATRICS DISCHARGE SUMMARY    PATIENT'S NAME: Eren James  YOB: 1945  MEDICAL RECORD NUMBER:  [id number]  Place of Service where encounter took place:  [facility] ([facility]) [[id number]]    PRIMARY CARE PROVIDER AND CLINIC RESPONSIBLE AFTER TRANSFER:   Jakob Conner MD, MD, NO INFO FOUND / [facility] [id number]    Non-FMG Provider     Transferring providers: FARZANA Mclaughlin CNP; Cassius Peralta MD  Recent Hospitalization/ED:  [facility] stay 2/28/24 to 3/3/24.  Date of SNF Admission:  3/3/24  Date of SNF (anticipated) Discharge:  3/22/24  Discharged to: previous independent home  Cognitive Scores: SLUMS: 19/30  Physical Function: Ambulating 150 ft with walker.  DME: Walker      CODE STATUS/ADVANCE DIRECTIVES DISCUSSION:  Full Code   ALLERGIES: Armodafinil      NURSING FACILITY COURSE     Medication Changes/Rationale:   Restarted torsemide 20mg every day dx BLE edema  Started potassium chloride 20meq every day due to hypokalemia      Summary of nursing facility stay:     PMH: recurrent falls, chronic pain on opioids with opioid dependence and pain pump, polypharmacy, recurrent DVT on Eliquis, hypertension, hypercholesteremia, COPD, coronary artery disease, BPH, heart failure      Admitted to [facility] 2/28-3/3/24 due to recurrent falls, left shoulder hematoma, L1 compression fracture and multiple ecchymosis on the body. NSG and Ortho consulted, plan to follow-up outpatient. Hx recurrent DVT on eliquis PTA was dc'd in the hospital, recent LE venous US 2/9/24 negative for DVT. If DVT in the future, could consider IVC filter. Referral also placed for Neuropsychiatry testing for cognitive impairment evaluation. Noted to have chronic pain with opiate dependence and polypharmacy concerns, PTA adderall was held upon discharge. In  addition, patient was hypotensive during hospital stay and PTA torsemide nad losartan were dc'd as well.      Transferred to [facility] on 3/3/24.      (S32.010D) Compression fracture of L1 vertebra with routine healing, subsequent encounter  (primary encounter diagnosis)  (S40.012D) Traumatic hematoma of left shoulder, subsequent encounter - Resolved  (G89.4) Chronic pain syndrome  Comment: L1 compression fracture and L shoulder hematoma r/t fall on 2/28/24. Hx recurrent falls. Chronic pain w/opiate dependence, s/p intrathecal pain pump. PTA lives w/spouse. SLUMS 19/30. Ambulates 150ft w/walker.  Plan:   - Continue tylenol PRN, dilaudid PRN  - Patient has intrathecal pain pump; not using boluses, recommending to review pain management plan with pain clinic upon discharge. Patient to follow-up with [facility] as directed  - Patient to follow-up with Ortho as directed  - Patient discharging home w/spouse, as well as [facility] home care services, including home PT, OT, RN, HHA.      (I82.409) Recurrent deep vein thrombosis (DVT) (H)  Comment: Hx recurrent DVT, last BLE venous US 2/9/24 negative for DVT. PTA eliquis dc'd due to recurrent falls.  Plan:   - Monitor symptoms. Consider IVC filter if develops new DVT.     (I50.9) Chronic congestive heart failure, unspecified heart failure type (H)  (I10) Essential hypertension, benign  Comment: Chronic CHF w/preserved EF. HTN w/recent hypotension episodes, PTA torsemide, potassium, losartan dc'd- during hospital stay.    Plan:   - Restarted torsemide 20meq every day and potassium chloride 20meq every day due to increase in BLE edema  - Continue imdur, lipitor, metoprolol  - Monitor BP/HR, weights     (R41.89) Cognitive impairment  Comment: Chronic cognitive impairment. PTA adderall dc'd. SLUMS 19/30.   Plan:   - Referral to Neuropsychiatry Clinic placed during hospital stay  - Continue wellbutrin, buspar, cymbalta, trazodone  - Monitor changes in mood or  behaviors    (L03.116) Cellulitis of left lower extremity  Comment: Cellulitis of LLE resolved  Plan:   - Completed 7-day course of doxycycline today      Discharge Medications:    MED REC REQUIRED  Post Medication Reconciliation Status: discharge medications reconciled and changed, per note/orders     Current Outpatient Medications   Medication Sig Dispense Refill     buPROPion (WELLBUTRIN XL) 150 MG 24 hr tablet Take 300 mg by mouth every morning       busPIRone (BUSPAR) 10 MG tablet Take 1 tablet (10 mg) by mouth 3 times daily       finasteride (PROSCAR) 5 MG tablet Take 1 tablet by mouth daily       FLOMAX 0.4 MG OR CP24 1 TABLET DAILY AFTER A MEAL 30 11     HYDROmorphone (DILAUDID) 2 MG tablet Take 1-2 tablets (2-4 mg) by mouth every 4 hours as needed for severe pain ((2mg for pain 3-6/10; 4mg for pain 7-10/10)) 60 tablet 0     isosorbide mononitrate (IMDUR) 60 MG 24 hr tablet Take 90 mg by mouth daily       acetaminophen (TYLENOL) 325 MG tablet Take 3 tablets (975 mg) by mouth every 8 hours as needed for mild pain       allopurinol (ZYLOPRIM) 300 MG tablet TAKE 1 TABLET(300 MG) BY MOUTH EVERY DAY Strength: 300 mg       atorvastatin (LIPITOR) 40 MG tablet Take 40 mg by mouth daily       B Complex CAPS Take 1 tablet by mouth daily       chlorhexidine (PERIDEX) 0.12 % solution Swish and spit 15 mLs in mouth 2 times daily as needed       dicyclomine (BENTYL) 20 MG tablet Take 20 mg by mouth 3 times daily as needed       DULoxetine (CYMBALTA) 30 MG capsule Take 60 mg by mouth every morning       lidocaine (XYLOCAINE) 5 % external ointment APPLY TOPICALLY TO THE AFFECTED AREA THREE TIMES DAILY AS NEEDED       medication given by implanted intrathecal pump continuous Drug # 1: Bupivacaine (Marcaine)  - Conc:25 mg/mL - Total Dose / 24 hours: 17.52 mg    Drug # 2: Hydromorphone (Dilaudid)  - Conc:0.8 mg/mL - Total Dose / 24 hours: 0.56 mg    Drug # 3: Fentanyl (Sublimaze) - Conc: 1817.5 mcg/mL - Total Dose / 24 hours:  1273.9 mcg    Diluent: NS    May give boluses every 2 hours for up to 3 boluses per day of hydromorphone at 0.1982mg, fentanyl at 45 mcg and bupivacaine 0.619 mg in a 24 hour period   Pump Reservoir Volume: 40 mL  Outside Clinic & Provider: [facility]   Last Refill Date: 2/27/24  Next Refill Date: 4/16/2024  Low [place] Alarm Date: 1/20/2024  Pump : [employer]     Please consider consulting the pain team if the patient is admitted with an IT pump.       metoprolol succinate ER (TOPROL XL) 25 MG 24 hr tablet Take 1 tablet (25 mg) by mouth daily 90 tablet 0     MULTIVITAMINS OR TABS 1 tablet daily  0     NARCAN 4 MG/0.1ML nasal spray once as needed       nitroGLYcerin (NITROSTAT) 0.4 MG sublingual tablet DISSOLVE 1 TABLET UNDER THE TONGUE AS NEEDED FOR CHEST PAIN EVERY 5 MINUTES AS NEEDED AS DIRECTED       ondansetron (ZOFRAN ODT) 4 MG ODT tab DISSOLVE 1 TABLET(4 MG) ON THE TONGUE EVERY 8 HOURS AS NEEDED FOR NAUSEA OR VOMITING       polyethylene glycol (MIRALAX) 17 g packet Take 1 packet by mouth three times a week       senna-docusate (SENOKOT-S/PERICOLACE) 8.6-50 MG tablet Take 1 tablet by mouth 2 times daily as needed for constipation       torsemide (DEMADEX) 20 MG tablet Take 20 mg by mouth daily       traZODone (DESYREL) 50 MG tablet Take 75 mg by mouth at bedtime          Controlled medications:   Medication: dilaudid 2mg , 40 tabs given to patient at the time of discharge to take home. Do not use bolus dosing with pain pump until reviewed plan of care with pain clinic.        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     See [facility]     ASD (atrial septal defect)      Asthma      BPH (benign prostatic hyperplasia)      CHF (congestive heart failure)      Cholelithiasis      Chronic neck pain      COPD (chronic obstructive pulmonary disease) (H)      Coronary artery disease     Previous coronary angioplasties     Deep vein  "thrombosis      Depressive disorder      Diverticulitis of colon      Esophageal reflux      Gout      Hyperlipidemia      Hypertension      Hypertrophy of prostate with urinary obstruction and other lower urinary tract symptoms (LUTS)      Kidney stone      Subdural hematoma after fall      SVT (supraventricular tachycardia)      Testicular hypofunction      Physical Exam:   Vitals: /66   Pulse 58   Temp 97.8  F (36.6  C)   Resp 18   Ht 1.575 m (5' 2\")   Wt 77.7 kg (171 lb 3.2 oz)   SpO2 93%   BMI 31.31 kg/m    BMI: Body mass index is 31.31 kg/m .  GENERAL APPEARANCE:  Alert, in no distress, oriented, cooperative  RESP:  respiratory effort and palpation of chest normal, lungs clear to auscultation , no respiratory distress  CV:  regular rate and rhythm, no murmur, rub, or gallop, +2 BLE edema.  ABDOMEN:  normal bowel sounds, soft, nontender, no guarding or rebound  M/S:   Gait and station abnormal, sitting in wheelchair.  SKIN:  Inspection of skin and subcutaneous tissue baseline, Palpation of skin and subcutaneous tissue baseline. Multiple bruises to face at end stages of healing. Multiple scabs to BLE. LUE bruising improving. LLE cellulitis resolved.  NEURO:   Cranial nerves 2-12 are normal tested and grossly at patient's baseline, Examination of sensation by touch normal  PSYCH:  oriented X 3, memory impaired , affect and mood normal        SNF labs: Recent labs in [facility] reviewed by me today.  and Most Recent 3 CBC's:  Recent Labs   Lab Test 03/18/24  0536 03/12/24  0752 03/05/24  0515 03/02/24  0607 02/29/24  1744 02/29/24  0614   WBC 6.8  --   --  6.8  --  7.4   HGB 9.6* 10.9* 8.8* 9.0*   < > 8.5*   MCV 95  --   --  96  --  94     --   --  422  --  337    < > = values in this interval not displayed.     Most Recent 3 BMP's:  Recent Labs   Lab Test 03/19/24  0535 03/18/24  0536 03/12/24  0752 03/05/24  0515   NA  --  142 144 140   POTASSIUM 3.7 2.8* 3.5 4.5   CHLORIDE  --  99 102 106   CO2  " --  30* 30* 26   BUN  --  15.6 12.7 11.6   CR  --  0.67 0.70 0.61*   ANIONGAP  --  13 12 8   SHAUN  --  8.8 9.1 9.1   GLC  --  149* 101* 105*     TSH   Date Value Ref Range Status   12/04/2023 3.20 0.30 - 4.20 uIU/mL Final   03/09/2005 2.18 0.4 - 5.0 mU/L Final         DISCHARGE PLAN:    Follow up labs: No labs orders/due      Medical Follow Up:      Follow up with primary care provider in 1-2 weeks  Follow up with specialist - [facility], Ortho    Follow up with consultant - Neuropsychological testing       [facility] scheduled appointments:  Appointments in Next Year      Mar 26, 2024  9:00 AM  (Arrive by 8:45 AM)  XR LUMBAR SPINE 2/3 VIEWS with [id number]  [facility] [place] ([facility] ) [phone]     Mar 26, 2024  9:30 AM  MEDSPINE RTN with Erik Savage CNP  [facility] [place] ([facility] ) [phone]     May 23, 2024 11:30 AM  Non-Malignant Onc with Joe Vargas MD  [facility] [place] ([facility] ) [phone]             Discharge Services: Home Care:  Occupational Therapy, Physical Therapy, Registered Nurse, and Home Health Aide      Discharge Instructions Verbalized to Patient at Discharge:   Weigh yourself daily in the morning and keep a record. Call your primary clinic: a) if you are more short of breath, or b) if your weight changes more than 3 pounds in one day or more than 5 pounds in one week.   Wound care LLE skin tear: cleanse w/ wound cleanser, pat dry, cover with primapore. Change every day    Notify your PCP if you have increased redness, swelling, tenderness, or drainage at your incision site.   Notify PCP if you have a fever greater than 100.5 degrees.   24-hour supervision is recommended for safety.           TOTAL DISCHARGE TIME:   Greater than 30 minutes    Electronically  signed by:  FARZANA Mclaughlin [facility] Discharge Orders    Name: Eren James  : 1945  Planned Discharge Date: 3/22/24  Discharged to: previous independent home with spouse      MEDICAL FOLLOW UP      Follow up with primary care provider in 1-2 weeks  Follow up with specialist - [facility], Ortho      Follow up with consultant - Neuropsychological testing       FUTURE LABS: No labs orders/due      ORDER CHANGES:  Restarted torsemide 20mg every day dx BLE edema  Started potassium chloride 20meq every day due to hypokalemia      DISCHARGE MEDICATIONS:  The patient s pharmacy is authorized to dispense a 30-day supply of medications. Refill requests should be directed to the primary provider, Jakob Conner.   At discharge, the facility may send patient's remaining supply of controlled substances, specifically dilaudid 2mg, #40 tablets.    Current Outpatient Medications   Medication Sig Dispense Refill     buPROPion (WELLBUTRIN XL) 150 MG 24 hr tablet Take 300 mg by mouth every morning       busPIRone (BUSPAR) 10 MG tablet Take 1 tablet (10 mg) by mouth 3 times daily       finasteride (PROSCAR) 5 MG tablet Take 1 tablet by mouth daily       FLOMAX 0.4 MG OR CP24 1 TABLET DAILY AFTER A MEAL 30 11     HYDROmorphone (DILAUDID) 2 MG tablet Take 1-2 tablets (2-4 mg) by mouth every 4 hours as needed for severe pain ((2mg for pain 3-6/10; 4mg for pain 7-10/10)) 60 tablet 0     isosorbide mononitrate (IMDUR) 60 MG 24 hr tablet Take 90 mg by mouth daily       acetaminophen (TYLENOL) 325 MG tablet Take 3 tablets (975 mg) by mouth every 8 hours as needed for mild pain       allopurinol (ZYLOPRIM) 300 MG tablet TAKE 1 TABLET(300 MG) BY MOUTH EVERY DAY Strength: 300 mg       atorvastatin (LIPITOR) 40 MG tablet Take 40 mg by mouth daily       B Complex CAPS Take 1 tablet by mouth daily       chlorhexidine (PERIDEX) 0.12 % solution Swish and spit 15 mLs in  mouth 2 times daily as needed       dicyclomine (BENTYL) 20 MG tablet Take 20 mg by mouth 3 times daily as needed       DULoxetine (CYMBALTA) 30 MG capsule Take 60 mg by mouth every morning       lidocaine (XYLOCAINE) 5 % external ointment APPLY TOPICALLY TO THE AFFECTED AREA THREE TIMES DAILY AS NEEDED       medication given by implanted intrathecal pump continuous Drug # 1: Bupivacaine (Marcaine)  - Conc:25 mg/mL - Total Dose / 24 hours: 17.52 mg    Drug # 2: Hydromorphone (Dilaudid)  - Conc:0.8 mg/mL - Total Dose / 24 hours: 0.56 mg    Drug # 3: Fentanyl (Sublimaze) - Conc: 1817.5 mcg/mL - Total Dose / 24 hours: 1273.9 mcg    Diluent: NS    May give boluses every 2 hours for up to 3 boluses per day of hydromorphone at 0.1982mg, fentanyl at 45 mcg and bupivacaine 0.619 mg in a 24 hour period   Pump Reservoir Volume: 40 mL  Outside Clinic & Provider: [facility]   Last Refill Date: 2/27/24  Next Refill Date: 4/16/2024  Low [place] Alarm Date: 1/20/2024  Pump : [email or web address]     Please consider consulting the pain team if the patient is admitted with an IT pump.       metoprolol succinate ER (TOPROL XL) 25 MG 24 hr tablet Take 1 tablet (25 mg) by mouth daily 90 tablet 0     MULTIVITAMINS OR TABS 1 tablet daily  0     NARCAN 4 MG/0.1ML nasal spray once as needed       nitroGLYcerin (NITROSTAT) 0.4 MG sublingual tablet DISSOLVE 1 TABLET UNDER THE TONGUE AS NEEDED FOR CHEST PAIN EVERY 5 MINUTES AS NEEDED AS DIRECTED       ondansetron (ZOFRAN ODT) 4 MG ODT tab DISSOLVE 1 TABLET(4 MG) ON THE TONGUE EVERY 8 HOURS AS NEEDED FOR NAUSEA OR VOMITING       polyethylene glycol (MIRALAX) 17 g packet Take 1 packet by mouth three times a week       senna-docusate (SENOKOT-S/PERICOLACE) 8.6-50 MG tablet Take 1 tablet by mouth 2 times daily as needed for constipation       torsemide (DEMADEX) 20 MG tablet Take 20 mg by mouth daily       traZODone (DESYREL) 50 MG tablet Take 75 mg by mouth at bedtime          SERVICES:  Home Care:  Occupational Therapy, Physical Therapy, Registered Nurse, and Home Health Aide     ADDITIONAL INSTRUCTIONS:  Weigh yourself daily in the morning and keep a record. Call your primary clinic: a) if you are more short of breath, or b) if your weight changes more than 3 pounds in one day or more than 5 pounds in one week.   Wound care LLE skin tear: cleanse w/ wound cleanser, pat dry, cover with primapore. Change every day    Notify your PCP if you have increased redness, swelling, tenderness, or drainage at your incision site.   Notify PCP if you have a fever greater than 100.5 degrees.   24-hour supervision is recommended for safety.         FARZANA Mclaughlin CNP  This document was electronically signed on March 21, 2024          Sincerely,        FARZANA Mclaughlin CNP

## 2024-03-21 NOTE — PROGRESS NOTES
[facility] GERIATRICS DISCHARGE SUMMARY    PATIENT'S NAME: Eren James  YOB: 1945  MEDICAL RECORD NUMBER:  [id number]  Place of Service where encounter took place:  [facility] ([facility]) [[id number]]    PRIMARY CARE PROVIDER AND CLINIC RESPONSIBLE AFTER TRANSFER:   Jakob Conner MD, MD, NO INFO FOUND / [facility] [id number]    Non-FMG Provider     Transferring providers: FARZANA Mclaughlin CNP; Cassius Peralta MD  Recent Hospitalization/ED:  [facility] stay 2/28/24 to 3/3/24.  Date of SNF Admission:  3/3/24  Date of SNF (anticipated) Discharge:  3/22/24  Discharged to: previous independent home  Cognitive Scores: SLUMS: 19/30  Physical Function: Ambulating 150 ft with walker.  DME: Walker      CODE STATUS/ADVANCE DIRECTIVES DISCUSSION:  Full Code   ALLERGIES: Armodafinil      NURSING FACILITY COURSE     Medication Changes/Rationale:   Restarted torsemide 20mg every day dx BLE edema  Started potassium chloride 20meq every day due to hypokalemia      Summary of nursing facility stay:     PMH: recurrent falls, chronic pain on opioids with opioid dependence and pain pump, polypharmacy, recurrent DVT on Eliquis, hypertension, hypercholesteremia, COPD, coronary artery disease, BPH, heart failure      Admitted to [facility] 2/28-3/3/24 due to recurrent falls, left shoulder hematoma, L1 compression fracture and multiple ecchymosis on the body. NSG and Ortho consulted, plan to follow-up outpatient. Hx recurrent DVT on eliquis PTA was dc'd in the hospital, recent LE venous US 2/9/24 negative for DVT. If DVT in the future, could consider IVC filter. Referral also placed for Neuropsychiatry testing for cognitive impairment evaluation. Noted to have chronic pain with opiate dependence and polypharmacy concerns, PTA adderall was held upon discharge. In addition, patient was hypotensive during hospital stay and PTA torsemide nad losartan were dc'd  as well.      Transferred to [facility] on 3/3/24.      (S32.010D) Compression fracture of L1 vertebra with routine healing, subsequent encounter  (primary encounter diagnosis)  (S40.012D) Traumatic hematoma of left shoulder, subsequent encounter - Resolved  (G89.4) Chronic pain syndrome  Comment: L1 compression fracture and L shoulder hematoma r/t fall on 2/28/24. Hx recurrent falls. Chronic pain w/opiate dependence, s/p intrathecal pain pump. PTA lives w/spouse. SLUMS 19/30. Ambulates 150ft w/walker.  Plan:   - Continue tylenol PRN, dilaudid PRN  - Patient has intrathecal pain pump; not using boluses, recommending to review pain management plan with pain clinic upon discharge. Patient to follow-up with [facility] as directed  - Patient to follow-up with Ortho as directed  - Patient discharging home w/spouse, as well as [facility] home care services, including home PT, OT, RN, HHA.      (I82.409) Recurrent deep vein thrombosis (DVT) (H)  Comment: Hx recurrent DVT, last BLE venous US 2/9/24 negative for DVT. PTA eliquis dc'd due to recurrent falls.  Plan:   - Monitor symptoms. Consider IVC filter if develops new DVT.     (I50.9) Chronic congestive heart failure, unspecified heart failure type (H)  (I10) Essential hypertension, benign  Comment: Chronic CHF w/preserved EF. HTN w/recent hypotension episodes, PTA torsemide, potassium, losartan dc'd- during hospital stay.    Plan:   - Restarted torsemide 20meq every day and potassium chloride 20meq every day due to increase in BLE edema  - Continue imdur, lipitor, metoprolol  - Monitor BP/HR, weights     (R41.89) Cognitive impairment  Comment: Chronic cognitive impairment. PTA adderall dc'd. SLUMS 19/30.   Plan:   - Referral to Neuropsychiatry Clinic placed during hospital stay  - Continue wellbutrin, buspar, cymbalta, trazodone  - Monitor changes in mood or behaviors    (L03.116) Cellulitis of left lower extremity  Comment: Cellulitis of LLE resolved  Plan:   -  Completed 7-day course of doxycycline today      Discharge Medications:    MED REC REQUIRED  Post Medication Reconciliation Status: discharge medications reconciled and changed, per note/orders     Current Outpatient Medications   Medication Sig Dispense Refill    buPROPion (WELLBUTRIN XL) 150 MG 24 hr tablet Take 300 mg by mouth every morning      busPIRone (BUSPAR) 10 MG tablet Take 1 tablet (10 mg) by mouth 3 times daily      finasteride (PROSCAR) 5 MG tablet Take 1 tablet by mouth daily      FLOMAX 0.4 MG OR CP24 1 TABLET DAILY AFTER A MEAL 30 11    HYDROmorphone (DILAUDID) 2 MG tablet Take 1-2 tablets (2-4 mg) by mouth every 4 hours as needed for severe pain ((2mg for pain 3-6/10; 4mg for pain 7-10/10)) 60 tablet 0    isosorbide mononitrate (IMDUR) 60 MG 24 hr tablet Take 90 mg by mouth daily      acetaminophen (TYLENOL) 325 MG tablet Take 3 tablets (975 mg) by mouth every 8 hours as needed for mild pain      allopurinol (ZYLOPRIM) 300 MG tablet TAKE 1 TABLET(300 MG) BY MOUTH EVERY DAY Strength: 300 mg      atorvastatin (LIPITOR) 40 MG tablet Take 40 mg by mouth daily      B Complex CAPS Take 1 tablet by mouth daily      chlorhexidine (PERIDEX) 0.12 % solution Swish and spit 15 mLs in mouth 2 times daily as needed      dicyclomine (BENTYL) 20 MG tablet Take 20 mg by mouth 3 times daily as needed      DULoxetine (CYMBALTA) 30 MG capsule Take 60 mg by mouth every morning      lidocaine (XYLOCAINE) 5 % external ointment APPLY TOPICALLY TO THE AFFECTED AREA THREE TIMES DAILY AS NEEDED      medication given by implanted intrathecal pump continuous Drug # 1: Bupivacaine (Marcaine)  - Conc:25 mg/mL - Total Dose / 24 hours: 17.52 mg    Drug # 2: Hydromorphone (Dilaudid)  - Conc:0.8 mg/mL - Total Dose / 24 hours: 0.56 mg    Drug # 3: Fentanyl (Sublimaze) - Conc: 1817.5 mcg/mL - Total Dose / 24 hours: 1273.9 mcg    Diluent: NS    May give boluses every 2 hours for up to 3 boluses per day of hydromorphone at 0.1982mg,  fentanyl at 45 mcg and bupivacaine 0.619 mg in a 24 hour period   Pump Reservoir Volume: 40 mL  Outside Clinic & Provider: [facility]   Last Refill Date: 2/27/24  Next Refill Date: 4/16/2024  Low [place] Alarm Date: 1/20/2024  Pump : DDRdrive     Please consider consulting the pain team if the patient is admitted with an IT pump.      metoprolol succinate ER (TOPROL XL) 25 MG 24 hr tablet Take 1 tablet (25 mg) by mouth daily 90 tablet 0    MULTIVITAMINS OR TABS 1 tablet daily  0    NARCAN 4 MG/0.1ML nasal spray once as needed      nitroGLYcerin (NITROSTAT) 0.4 MG sublingual tablet DISSOLVE 1 TABLET UNDER THE TONGUE AS NEEDED FOR CHEST PAIN EVERY 5 MINUTES AS NEEDED AS DIRECTED      ondansetron (ZOFRAN ODT) 4 MG ODT tab DISSOLVE 1 TABLET(4 MG) ON THE TONGUE EVERY 8 HOURS AS NEEDED FOR NAUSEA OR VOMITING      polyethylene glycol (MIRALAX) 17 g packet Take 1 packet by mouth three times a week      senna-docusate (SENOKOT-S/PERICOLACE) 8.6-50 MG tablet Take 1 tablet by mouth 2 times daily as needed for constipation      torsemide (DEMADEX) 20 MG tablet Take 20 mg by mouth daily      traZODone (DESYREL) 50 MG tablet Take 75 mg by mouth at bedtime          Controlled medications:   Medication: dilaudid 2mg , 40 tabs given to patient at the time of discharge to take home. Do not use bolus dosing with pain pump until reviewed plan of care with pain clinic.        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     See [facility]    ASD (atrial septal defect)     Asthma     BPH (benign prostatic hyperplasia)     CHF (congestive heart failure)     Cholelithiasis     Chronic neck pain     COPD (chronic obstructive pulmonary disease) (H)     Coronary artery disease     Previous coronary angioplasties    Deep vein thrombosis     Depressive disorder     Diverticulitis of colon     Esophageal reflux     Gout     Hyperlipidemia     Hypertension      "Hypertrophy of prostate with urinary obstruction and other lower urinary tract symptoms (LUTS)     Kidney stone     Subdural hematoma after fall     SVT (supraventricular tachycardia)     Testicular hypofunction      Physical Exam:   Vitals: /66   Pulse 58   Temp 97.8  F (36.6  C)   Resp 18   Ht 1.575 m (5' 2\")   Wt 77.7 kg (171 lb 3.2 oz)   SpO2 93%   BMI 31.31 kg/m    BMI: Body mass index is 31.31 kg/m .  GENERAL APPEARANCE:  Alert, in no distress, oriented, cooperative  RESP:  respiratory effort and palpation of chest normal, lungs clear to auscultation , no respiratory distress  CV:  regular rate and rhythm, no murmur, rub, or gallop, +2 BLE edema.  ABDOMEN:  normal bowel sounds, soft, nontender, no guarding or rebound  M/S:   Gait and station abnormal, sitting in wheelchair.  SKIN:  Inspection of skin and subcutaneous tissue baseline, Palpation of skin and subcutaneous tissue baseline. Multiple bruises to face at end stages of healing. Multiple scabs to BLE. LUE bruising improving. LLE cellulitis resolved.  NEURO:   Cranial nerves 2-12 are normal tested and grossly at patient's baseline, Examination of sensation by touch normal  PSYCH:  oriented X 3, memory impaired , affect and mood normal        SNF labs: Recent labs in [facility] reviewed by me today.  and Most Recent 3 CBC's:  Recent Labs   Lab Test 03/18/24  0536 03/12/24  0752 03/05/24  0515 03/02/24  0607 02/29/24  1744 02/29/24  0614   WBC 6.8  --   --  6.8  --  7.4   HGB 9.6* 10.9* 8.8* 9.0*   < > 8.5*   MCV 95  --   --  96  --  94     --   --  422  --  337    < > = values in this interval not displayed.     Most Recent 3 BMP's:  Recent Labs   Lab Test 03/19/24  0535 03/18/24  0536 03/12/24  0752 03/05/24  0515   NA  --  142 144 140   POTASSIUM 3.7 2.8* 3.5 4.5   CHLORIDE  --  99 102 106   CO2  --  30* 30* 26   BUN  --  15.6 12.7 11.6   CR  --  0.67 0.70 0.61*   ANIONGAP  --  13 12 8   SHAUN  --  8.8 9.1 9.1   GLC  --  149* 101* 105* "     TSH   Date Value Ref Range Status   12/04/2023 3.20 0.30 - 4.20 uIU/mL Final   03/09/2005 2.18 0.4 - 5.0 mU/L Final         DISCHARGE PLAN:    Follow up labs: No labs orders/due      Medical Follow Up:      Follow up with primary care provider in 1-2 weeks  Follow up with specialist - [facility], Ortho    Follow up with consultant - Neuropsychological testing       [facility] scheduled appointments:  Appointments in Next Year      Mar 26, 2024  9:00 AM  (Arrive by 8:45 AM)  XR LUMBAR SPINE 2/3 VIEWS with [id number]  [facility] [place] ([facility] [place] ) [phone]     Mar 26, 2024  9:30 AM  MEDSPINE RTN with Erik Savage CNP  [facility] [place] ([facility] ) [phone]     May 23, 2024 11:30 AM  Non-Malignant Onc with Joe Vargas MD  [facility] [place] ([facility] ) [phone]             Discharge Services: Home Care:  Occupational Therapy, Physical Therapy, Registered Nurse, and Home Health Aide      Discharge Instructions Verbalized to Patient at Discharge:   Weigh yourself daily in the morning and keep a record. Call your primary clinic: a) if you are more short of breath, or b) if your weight changes more than 3 pounds in one day or more than 5 pounds in one week.   Wound care LLE skin tear: cleanse w/ wound cleanser, pat dry, cover with primapore. Change every day    Notify your PCP if you have increased redness, swelling, tenderness, or drainage at your incision site.   Notify PCP if you have a fever greater than 100.5 degrees.   24-hour supervision is recommended for safety.           TOTAL DISCHARGE TIME:   Greater than 30 minutes    Electronically signed by:  FARZANA Mclaughlin CNP

## 2024-03-22 NOTE — PROGRESS NOTES
[facility] Discharge Orders    Name: Eren James  : 1945  Planned Discharge Date: 3/22/24  Discharged to: previous independent home with spouse      MEDICAL FOLLOW UP      Follow up with primary care provider in 1-2 weeks  Follow up with specialist - [facility], Ortho      Follow up with consultant - Neuropsychological testing       FUTURE LABS: No labs orders/due      ORDER CHANGES:  Restarted torsemide 20mg every day dx BLE edema  Started potassium chloride 20meq every day due to hypokalemia      DISCHARGE MEDICATIONS:  The patient s pharmacy is authorized to dispense a 30-day supply of medications. Refill requests should be directed to the primary provider, Jakob Conner.   At discharge, the facility may send patient's remaining supply of controlled substances, specifically dilaudid 2mg, #40 tablets.    Current Outpatient Medications   Medication Sig Dispense Refill    buPROPion (WELLBUTRIN XL) 150 MG 24 hr tablet Take 300 mg by mouth every morning      busPIRone (BUSPAR) 10 MG tablet Take 1 tablet (10 mg) by mouth 3 times daily      finasteride (PROSCAR) 5 MG tablet Take 1 tablet by mouth daily      FLOMAX 0.4 MG OR CP24 1 TABLET DAILY AFTER A MEAL 30 11    HYDROmorphone (DILAUDID) 2 MG tablet Take 1-2 tablets (2-4 mg) by mouth every 4 hours as needed for severe pain ((2mg for pain 3-6/10; 4mg for pain 7-10/10)) 60 tablet 0    isosorbide mononitrate (IMDUR) 60 MG 24 hr tablet Take 90 mg by mouth daily      acetaminophen (TYLENOL) 325 MG tablet Take 3 tablets (975 mg) by mouth every 8 hours as needed for mild pain      allopurinol (ZYLOPRIM) 300 MG tablet TAKE 1 TABLET(300 MG) BY MOUTH EVERY DAY Strength: 300 mg      atorvastatin (LIPITOR) 40 MG tablet Take 40 mg by mouth daily      B Complex CAPS Take 1 tablet by mouth daily      chlorhexidine (PERIDEX) 0.12 % solution Swish and spit 15 mLs in mouth 2 times daily as needed      dicyclomine (BENTYL) 20 MG tablet Take  20 mg by mouth 3 times daily as needed      DULoxetine (CYMBALTA) 30 MG capsule Take 60 mg by mouth every morning      lidocaine (XYLOCAINE) 5 % external ointment APPLY TOPICALLY TO THE AFFECTED AREA THREE TIMES DAILY AS NEEDED      medication given by implanted intrathecal pump continuous Drug # 1: Bupivacaine (Marcaine)  - Conc:25 mg/mL - Total Dose / 24 hours: 17.52 mg    Drug # 2: Hydromorphone (Dilaudid)  - Conc:0.8 mg/mL - Total Dose / 24 hours: 0.56 mg    Drug # 3: Fentanyl (Sublimaze) - Conc: 1817.5 mcg/mL - Total Dose / 24 hours: 1273.9 mcg    Diluent: NS    May give boluses every 2 hours for up to 3 boluses per day of hydromorphone at 0.1982mg, fentanyl at 45 mcg and bupivacaine 0.619 mg in a 24 hour period   Pump Reservoir Volume: 40 mL  Outside Clinic & Provider: [facility]   Last Refill Date: 2/27/24  Next Refill Date: 4/16/2024  Low [place] Alarm Date: 1/20/2024  Pump : [email or web address]     Please consider consulting the pain team if the patient is admitted with an IT pump.      metoprolol succinate ER (TOPROL XL) 25 MG 24 hr tablet Take 1 tablet (25 mg) by mouth daily 90 tablet 0    MULTIVITAMINS OR TABS 1 tablet daily  0    NARCAN 4 MG/0.1ML nasal spray once as needed      nitroGLYcerin (NITROSTAT) 0.4 MG sublingual tablet DISSOLVE 1 TABLET UNDER THE TONGUE AS NEEDED FOR CHEST PAIN EVERY 5 MINUTES AS NEEDED AS DIRECTED      ondansetron (ZOFRAN ODT) 4 MG ODT tab DISSOLVE 1 TABLET(4 MG) ON THE TONGUE EVERY 8 HOURS AS NEEDED FOR NAUSEA OR VOMITING      polyethylene glycol (MIRALAX) 17 g packet Take 1 packet by mouth three times a week      senna-docusate (SENOKOT-S/PERICOLACE) 8.6-50 MG tablet Take 1 tablet by mouth 2 times daily as needed for constipation      torsemide (DEMADEX) 20 MG tablet Take 20 mg by mouth daily      traZODone (DESYREL) 50 MG tablet Take 75 mg by mouth at bedtime         SERVICES:  Home Care:  Occupational Therapy, Physical Therapy, Registered Nurse, and Home Health  Aide     ADDITIONAL INSTRUCTIONS:  Weigh yourself daily in the morning and keep a record. Call your primary clinic: a) if you are more short of breath, or b) if your weight changes more than 3 pounds in one day or more than 5 pounds in one week.   Wound care LLE skin tear: cleanse w/ wound cleanser, pat dry, cover with primapore. Change every day    Notify your PCP if you have increased redness, swelling, tenderness, or drainage at your incision site.   Notify PCP if you have a fever greater than 100.5 degrees.   24-hour supervision is recommended for safety.         FARZANA Mclaughlin CNP  This document was electronically signed on March 21, 2024

## 2024-04-22 DIAGNOSIS — I47.10 PAROXYSMAL SUPRAVENTRICULAR TACHYCARDIA (H): ICD-10-CM

## 2024-04-22 RX ORDER — METOPROLOL SUCCINATE 25 MG/1
25 TABLET, EXTENDED RELEASE ORAL DAILY
Qty: 90 TABLET | OUTPATIENT
Start: 2024-04-22

## 2024-04-29 ENCOUNTER — LAB REQUISITION (OUTPATIENT)
Dept: LAB | Facility: CLINIC | Age: 79
End: 2024-04-29
Payer: MEDICARE

## 2024-04-29 DIAGNOSIS — Z11.1 ENCOUNTER FOR SCREENING FOR RESPIRATORY TUBERCULOSIS: ICD-10-CM

## 2024-04-30 ENCOUNTER — LAB REQUISITION (OUTPATIENT)
Dept: LAB | Facility: CLINIC | Age: 79
End: 2024-04-30
Payer: MEDICARE

## 2024-04-30 DIAGNOSIS — I50.9 HEART FAILURE, UNSPECIFIED (H): ICD-10-CM

## 2024-04-30 LAB — C DIFF TOX B STL QL: NEGATIVE

## 2024-04-30 PROCEDURE — P9604 ONE-WAY ALLOW PRORATED TRIP: HCPCS | Performed by: INTERNAL MEDICINE

## 2024-04-30 PROCEDURE — 87493 C DIFF AMPLIFIED PROBE: CPT | Performed by: INTERNAL MEDICINE

## 2024-04-30 PROCEDURE — 36415 COLL VENOUS BLD VENIPUNCTURE: CPT | Performed by: INTERNAL MEDICINE

## 2024-04-30 PROCEDURE — 86481 TB AG RESPONSE T-CELL SUSP: CPT | Performed by: INTERNAL MEDICINE

## 2024-05-01 LAB
ANION GAP SERPL CALCULATED.3IONS-SCNC: 11 MMOL/L (ref 7–15)
BUN SERPL-MCNC: 21 MG/DL (ref 8–23)
CALCIUM SERPL-MCNC: 9.3 MG/DL (ref 8.8–10.2)
CHLORIDE SERPL-SCNC: 101 MMOL/L (ref 98–107)
CREAT SERPL-MCNC: 0.71 MG/DL (ref 0.67–1.17)
DEPRECATED HCO3 PLAS-SCNC: 26 MMOL/L (ref 22–29)
EGFRCR SERPLBLD CKD-EPI 2021: >90 ML/MIN/1.73M2
GAMMA INTERFERON BACKGROUND BLD IA-ACNC: 0.02 IU/ML
GLUCOSE SERPL-MCNC: 156 MG/DL (ref 70–99)
M TB IFN-G BLD-IMP: NEGATIVE
M TB IFN-G CD4+ BCKGRND COR BLD-ACNC: 9.27 IU/ML
MAGNESIUM SERPL-MCNC: 1.5 MG/DL (ref 1.7–2.3)
MITOGEN IGNF BCKGRD COR BLD-ACNC: 0 IU/ML
MITOGEN IGNF BCKGRD COR BLD-ACNC: 0.01 IU/ML
POTASSIUM SERPL-SCNC: 3.8 MMOL/L (ref 3.4–5.3)
QUANTIFERON MITOGEN: 9.29 IU/ML
QUANTIFERON NIL TUBE: 0.02 IU/ML
QUANTIFERON TB1 TUBE: 0.03 IU/ML
QUANTIFERON TB2 TUBE: 0.02
SODIUM SERPL-SCNC: 138 MMOL/L (ref 135–145)

## 2024-05-01 PROCEDURE — 83735 ASSAY OF MAGNESIUM: CPT | Performed by: INTERNAL MEDICINE

## 2024-05-01 PROCEDURE — P9604 ONE-WAY ALLOW PRORATED TRIP: HCPCS | Performed by: INTERNAL MEDICINE

## 2024-05-01 PROCEDURE — 80048 BASIC METABOLIC PNL TOTAL CA: CPT | Performed by: INTERNAL MEDICINE

## 2024-05-01 PROCEDURE — 36415 COLL VENOUS BLD VENIPUNCTURE: CPT | Performed by: INTERNAL MEDICINE

## 2024-05-02 NOTE — TELEPHONE ENCOUNTER
RECORDS STATUS - ALL OTHER DIAGNOSIS      RECORDS RECEIVED FROM: Justo Lawrence   DATE RECEIVED: 5/2   NOTES STATUS DETAILS   OFFICE NOTE from referring provider Melinda Cortes   OFFICE NOTE from medical oncologist JO ANN Knight: 4/11/23   MEDICATION LIST [facility]    LABS     ANYTHING RELATED TO DIAGNOSIS Epic 4/30/24

## 2024-05-06 ENCOUNTER — LAB REQUISITION (OUTPATIENT)
Dept: LAB | Facility: CLINIC | Age: 79
End: 2024-05-06
Payer: MEDICARE

## 2024-05-06 DIAGNOSIS — E61.2 MAGNESIUM DEFICIENCY: ICD-10-CM

## 2024-05-07 ENCOUNTER — LAB REQUISITION (OUTPATIENT)
Dept: LAB | Facility: CLINIC | Age: 79
End: 2024-05-07

## 2024-05-07 DIAGNOSIS — R19.7 DIARRHEA, UNSPECIFIED: ICD-10-CM

## 2024-05-07 LAB

## 2024-05-07 PROCEDURE — 36415 COLL VENOUS BLD VENIPUNCTURE: CPT | Performed by: INTERNAL MEDICINE

## 2024-05-07 PROCEDURE — P9604 ONE-WAY ALLOW PRORATED TRIP: HCPCS | Performed by: INTERNAL MEDICINE

## 2024-05-07 PROCEDURE — 87507 IADNA-DNA/RNA PROBE TQ 12-25: CPT | Performed by: NURSE PRACTITIONER

## 2024-05-07 PROCEDURE — 83735 ASSAY OF MAGNESIUM: CPT | Performed by: INTERNAL MEDICINE

## 2024-05-21 ENCOUNTER — PATIENT OUTREACH (OUTPATIENT)
Dept: ONCOLOGY | Facility: CLINIC | Age: 79
End: 2024-05-21
Payer: MEDICARE

## 2024-05-21 NOTE — TELEPHONE ENCOUNTER
[facility]: Hematology                                                                                        LVM with spouse regarding new hematology appointment on May 23 with Joe Vargas    Scheduled in February following discharge from inpatient   in December 2023    March 3, 2024 discharge summary indicates:  Recurrent DVTs  - Discussed with pt and wife that given his hematoma we are holding his eliquis.  Also discussed that given his recurrent falls the risk of bleeding and high fall risk outweighs the benefit of eliquis.  It was recommended that eliquis be discontinued going forward.  His most recent LE venous doppler on 2/9/2024 showed no evidence of DVT.  If DVT recurs could consider IVC filter, which was discussed with the patient and his wife.  Both were agreeable.              Attempting to reach patient to cancel or confirm     Viridiana Fonseca LPN  Hematology Care Coordination  [phone]

## 2024-05-23 ENCOUNTER — PRE VISIT (OUTPATIENT)
Dept: ONCOLOGY | Facility: CLINIC | Age: 79
End: 2024-05-23
Payer: MEDICARE

## 2024-07-26 DIAGNOSIS — I50.9 CHRONIC CONGESTIVE HEART FAILURE, UNSPECIFIED HEART FAILURE TYPE (H): ICD-10-CM

## 2024-07-26 RX ORDER — ONDANSETRON 4 MG/1
TABLET, ORALLY DISINTEGRATING ORAL
Qty: 10 TABLET | Refills: 0 | OUTPATIENT
Start: 2024-07-26

## 2024-09-24 DIAGNOSIS — F41.9 ANXIETY AND DEPRESSION: ICD-10-CM

## 2024-09-24 DIAGNOSIS — F32.A ANXIETY AND DEPRESSION: ICD-10-CM

## 2024-09-24 RX ORDER — BUSPIRONE HYDROCHLORIDE 10 MG/1
10 TABLET ORAL 3 TIMES DAILY
Qty: 90 TABLET | Refills: 0 | OUTPATIENT
Start: 2024-09-24

## 2024-12-30 DIAGNOSIS — I50.9 CHRONIC CONGESTIVE HEART FAILURE, UNSPECIFIED HEART FAILURE TYPE (H): ICD-10-CM

## 2024-12-30 RX ORDER — ONDANSETRON 4 MG/1
TABLET, ORALLY DISINTEGRATING ORAL
Qty: 10 TABLET | Refills: 0 | OUTPATIENT
Start: 2024-12-30

## 2025-06-09 ENCOUNTER — LAB REQUISITION (OUTPATIENT)
Dept: LAB | Facility: CLINIC | Age: 80
End: 2025-06-09
Payer: MEDICARE

## 2025-06-09 DIAGNOSIS — T88.8XXA OTHER SPECIFIED COMPLICATIONS OF SURGICAL AND MEDICAL CARE, NOT ELSEWHERE CLASSIFIED, INITIAL ENCOUNTER: ICD-10-CM

## 2025-06-09 PROCEDURE — 86334 IMMUNOFIX E-PHORESIS SERUM: CPT | Mod: ORL | Performed by: STUDENT IN AN ORGANIZED HEALTH CARE EDUCATION/TRAINING PROGRAM

## 2025-06-09 PROCEDURE — 86334 IMMUNOFIX E-PHORESIS SERUM: CPT | Mod: 26 | Performed by: STUDENT IN AN ORGANIZED HEALTH CARE EDUCATION/TRAINING PROGRAM

## 2025-06-10 LAB
B2 TRANSFERRIN FLD QL: POSITIVE
B2 TRANSFERRIN INTERPRETATION: ABNORMAL